# Patient Record
Sex: FEMALE | Race: WHITE | NOT HISPANIC OR LATINO | Employment: OTHER | URBAN - METROPOLITAN AREA
[De-identification: names, ages, dates, MRNs, and addresses within clinical notes are randomized per-mention and may not be internally consistent; named-entity substitution may affect disease eponyms.]

---

## 2017-01-16 ENCOUNTER — ALLSCRIPTS OFFICE VISIT (OUTPATIENT)
Dept: OTHER | Facility: OTHER | Age: 77
End: 2017-01-16

## 2017-02-20 ENCOUNTER — ALLSCRIPTS OFFICE VISIT (OUTPATIENT)
Dept: OTHER | Facility: OTHER | Age: 77
End: 2017-02-20

## 2017-02-28 ENCOUNTER — ALLSCRIPTS OFFICE VISIT (OUTPATIENT)
Dept: OTHER | Facility: OTHER | Age: 77
End: 2017-02-28

## 2017-02-28 ENCOUNTER — TRANSCRIBE ORDERS (OUTPATIENT)
Dept: ADMINISTRATIVE | Facility: HOSPITAL | Age: 77
End: 2017-02-28

## 2017-02-28 DIAGNOSIS — R25.9 ABNORMAL INVOLUNTARY MOVEMENT: ICD-10-CM

## 2017-02-28 DIAGNOSIS — R25.9 ABNORMAL INVOLUNTARY MOVEMENT: Primary | ICD-10-CM

## 2017-02-28 DIAGNOSIS — M54.14 RADICULOPATHY OF THORACIC REGION: ICD-10-CM

## 2017-03-01 ENCOUNTER — GENERIC CONVERSION - ENCOUNTER (OUTPATIENT)
Dept: OTHER | Facility: OTHER | Age: 77
End: 2017-03-01

## 2017-03-17 ENCOUNTER — HOSPITAL ENCOUNTER (OUTPATIENT)
Dept: MRI IMAGING | Facility: CLINIC | Age: 77
Discharge: HOME | End: 2017-03-17
Payer: MEDICARE

## 2017-03-17 DIAGNOSIS — R25.9 ABNORMAL INVOLUNTARY MOVEMENT: ICD-10-CM

## 2017-03-17 PROCEDURE — 72141 MRI NECK SPINE W/O DYE: CPT

## 2017-03-17 PROCEDURE — 70551 MRI BRAIN STEM W/O DYE: CPT

## 2017-03-20 ENCOUNTER — ALLSCRIPTS OFFICE VISIT (OUTPATIENT)
Dept: OTHER | Facility: OTHER | Age: 77
End: 2017-03-20

## 2017-03-24 ENCOUNTER — LAB CONVERSION - ENCOUNTER (OUTPATIENT)
Dept: OTHER | Facility: OTHER | Age: 77
End: 2017-03-24

## 2017-03-24 LAB
A/G RATIO (HISTORICAL): 1.3 (CALC) (ref 1–2.5)
ALBUMIN SERPL BCP-MCNC: 4 G/DL (ref 3.6–5.1)
ALP SERPL-CCNC: 73 U/L (ref 33–130)
ALT SERPL W P-5'-P-CCNC: 10 U/L (ref 6–29)
AST SERPL W P-5'-P-CCNC: 25 U/L (ref 10–35)
BASOPHILS # BLD AUTO: 0.7 %
BASOPHILS # BLD AUTO: 55 CELLS/UL (ref 0–200)
BILIRUB SERPL-MCNC: 0.4 MG/DL (ref 0.2–1.2)
BUN SERPL-MCNC: 14 MG/DL (ref 7–25)
BUN/CREA RATIO (HISTORICAL): ABNORMAL (CALC) (ref 6–22)
CALCIUM SERPL-MCNC: 9.1 MG/DL (ref 8.6–10.4)
CHLORIDE SERPL-SCNC: 91 MMOL/L (ref 98–110)
CO2 SERPL-SCNC: 30 MMOL/L (ref 20–31)
CREAT SERPL-MCNC: 0.93 MG/DL (ref 0.6–0.93)
DEPRECATED RDW RBC AUTO: 14.7 % (ref 11–15)
EGFR AFRICAN AMERICAN (HISTORICAL): 69 ML/MIN/1.73M2
EGFR-AMERICAN CALC (HISTORICAL): 60 ML/MIN/1.73M2
EOSINOPHIL # BLD AUTO: 1.6 %
EOSINOPHIL # BLD AUTO: 126 CELLS/UL (ref 15–500)
ERYTHROCYTE SEDIMENTATION RATE (HISTORICAL): 11 MM/H
GAMMA GLOBULIN (HISTORICAL): 3.1 G/DL (CALC) (ref 1.9–3.7)
GLUCOSE (HISTORICAL): 71 MG/DL (ref 65–99)
HCT VFR BLD AUTO: 39.1 % (ref 35–45)
HGB BLD-MCNC: 13.1 G/DL (ref 11.7–15.5)
LYME IGG/IGM AB (HISTORICAL): <0.9 INDEX
LYMPHOCYTES # BLD AUTO: 1904 CELLS/UL (ref 850–3900)
LYMPHOCYTES # BLD AUTO: 24.1 %
MCH RBC QN AUTO: 30.9 PG (ref 27–33)
MCHC RBC AUTO-ENTMCNC: 33.7 G/DL (ref 32–36)
MCV RBC AUTO: 91.7 FL (ref 80–100)
MONOCYTES # BLD AUTO: 727 CELLS/UL (ref 200–950)
MONOCYTES (HISTORICAL): 9.2 %
NEUTROPHILS # BLD AUTO: 5088 CELLS/UL (ref 1500–7800)
NEUTROPHILS # BLD AUTO: 64.4 %
PLATELET # BLD AUTO: 345 THOUSAND/UL (ref 140–400)
PMV BLD AUTO: 7 FL (ref 7.5–12.5)
POTASSIUM SERPL-SCNC: 4.6 MMOL/L (ref 3.5–5.3)
RBC # BLD AUTO: 4.26 MILLION/UL (ref 3.8–5.1)
SODIUM SERPL-SCNC: 127 MMOL/L (ref 135–146)
TOTAL PROTEIN (HISTORICAL): 7.1 G/DL (ref 6.1–8.1)
WBC # BLD AUTO: 7.9 THOUSAND/UL (ref 3.8–10.8)

## 2017-04-04 ENCOUNTER — ALLSCRIPTS OFFICE VISIT (OUTPATIENT)
Dept: OTHER | Facility: OTHER | Age: 77
End: 2017-04-04

## 2017-04-04 ENCOUNTER — TRANSCRIBE ORDERS (OUTPATIENT)
Dept: ADMINISTRATIVE | Facility: HOSPITAL | Age: 77
End: 2017-04-04

## 2017-04-04 DIAGNOSIS — M54.14 THORACIC ROOT LESIONS, NOT ELSEWHERE CLASSIFIED: Primary | ICD-10-CM

## 2017-04-12 ENCOUNTER — HOSPITAL ENCOUNTER (OUTPATIENT)
Dept: MRI IMAGING | Facility: CLINIC | Age: 77
Discharge: HOME | End: 2017-04-12
Payer: MEDICARE

## 2017-04-12 DIAGNOSIS — M54.14 RADICULOPATHY OF THORACIC REGION: ICD-10-CM

## 2017-04-17 ENCOUNTER — HOSPITAL ENCOUNTER (OUTPATIENT)
Dept: NEUROLOGY | Facility: HOSPITAL | Age: 77
Discharge: HOME | End: 2017-04-17
Attending: PSYCHIATRY & NEUROLOGY
Payer: MEDICARE

## 2017-04-17 DIAGNOSIS — R25.9 ABNORMAL INVOLUNTARY MOVEMENT: ICD-10-CM

## 2017-04-17 PROCEDURE — 95816 EEG AWAKE AND DROWSY: CPT

## 2017-05-01 ENCOUNTER — ALLSCRIPTS OFFICE VISIT (OUTPATIENT)
Dept: OTHER | Facility: OTHER | Age: 77
End: 2017-05-01

## 2017-05-03 ENCOUNTER — ALLSCRIPTS OFFICE VISIT (OUTPATIENT)
Dept: OTHER | Facility: OTHER | Age: 77
End: 2017-05-03

## 2017-05-08 ENCOUNTER — ALLSCRIPTS OFFICE VISIT (OUTPATIENT)
Dept: OTHER | Facility: OTHER | Age: 77
End: 2017-05-08

## 2017-05-12 ENCOUNTER — TRANSCRIBE ORDERS (OUTPATIENT)
Dept: MRI IMAGING | Facility: CLINIC | Age: 77
End: 2017-05-12

## 2017-05-12 DIAGNOSIS — M54.14 THORACIC ROOT LESIONS, NOT ELSEWHERE CLASSIFIED: Primary | ICD-10-CM

## 2017-05-15 ENCOUNTER — GENERIC CONVERSION - ENCOUNTER (OUTPATIENT)
Dept: OTHER | Facility: OTHER | Age: 77
End: 2017-05-15

## 2017-05-15 ENCOUNTER — HOSPITAL ENCOUNTER (OUTPATIENT)
Dept: MRI IMAGING | Facility: CLINIC | Age: 77
Discharge: HOME | End: 2017-05-15
Payer: MEDICARE

## 2017-05-15 DIAGNOSIS — M54.14 THORACIC ROOT LESIONS, NOT ELSEWHERE CLASSIFIED: ICD-10-CM

## 2017-05-15 PROCEDURE — 72146 MRI CHEST SPINE W/O DYE: CPT

## 2017-05-30 ENCOUNTER — ALLSCRIPTS OFFICE VISIT (OUTPATIENT)
Dept: OTHER | Facility: OTHER | Age: 77
End: 2017-05-30

## 2017-05-30 DIAGNOSIS — E87.1 HYPO-OSMOLALITY AND HYPONATREMIA: ICD-10-CM

## 2017-05-31 ENCOUNTER — ALLSCRIPTS OFFICE VISIT (OUTPATIENT)
Dept: OTHER | Facility: OTHER | Age: 77
End: 2017-05-31

## 2017-06-30 ENCOUNTER — ALLSCRIPTS OFFICE VISIT (OUTPATIENT)
Dept: OTHER | Facility: OTHER | Age: 77
End: 2017-06-30

## 2017-08-04 ENCOUNTER — ALLSCRIPTS OFFICE VISIT (OUTPATIENT)
Dept: OTHER | Facility: OTHER | Age: 77
End: 2017-08-04

## 2017-08-11 ENCOUNTER — ALLSCRIPTS OFFICE VISIT (OUTPATIENT)
Dept: OTHER | Facility: OTHER | Age: 77
End: 2017-08-11

## 2017-08-30 ENCOUNTER — GENERIC CONVERSION - ENCOUNTER (OUTPATIENT)
Dept: OTHER | Facility: OTHER | Age: 77
End: 2017-08-30

## 2017-09-19 ENCOUNTER — GENERIC CONVERSION - ENCOUNTER (OUTPATIENT)
Dept: OTHER | Facility: OTHER | Age: 77
End: 2017-09-19

## 2017-11-13 ENCOUNTER — ALLSCRIPTS OFFICE VISIT (OUTPATIENT)
Dept: OTHER | Facility: OTHER | Age: 77
End: 2017-11-13

## 2017-11-14 NOTE — PROGRESS NOTES
Assessment    1  Chronic pain syndrome (338 4) (G89 4)  2  Closed wedge fracture of thoracic vertebra (805 2) (S27 000A)    Plan  Osteoporosis    · Renew: TraMADol HCl - 50 MG Oral Tablet; TAKE 1 TABLET 3 TIMES DAILY; Do Not FillBefore: 14QVW3748  Rx By: Liyah Kinney; Dispense: 30 Days ; #:90 Tablet; Refill: 0;For: Osteoporosis; HUMBERTO = N; Print Rx    Discussion/Summary    This is a 20-year-old female who presents today for follow up office visit for management of chronic midthoracic low back and bilateral rib pain likely due to multiple compression fracture causing radicular symptoms  Her pain control is adequate with tramadol  She reports greater than 50% pain relief  I will provide her with a refill of tramadol 50mg po TID  She was given a 3 month supply and will follow up with my NP 1  in 12 weeks  encouraged her to continue Flexeril 10 mg by mouth po qhs and Neurontin 300 mg by mouth 3 times a day  [place] drug monitoring database was checked and is appropriate  UDS is consistent  risk of opioid medications, including dependence, addiction and tolerance were explained to the patient  The patient understands and agrees to use these medications only as prescribed  I have fully discussed the potential side effects of the medication with the patient, which include, but are not limited to, constipation, drowsiness, addiction, impaired judgment and risk of fatal overdose as not taken as prescribed  I have warned the patient that sharing medications is a felony  I warned against driving while taking sedating medications  At this point in time, the patient is showing no signs of addiction, abuse, diversion or suicidal ideation  cautioned patient regarding the use of benzodiazepines in conjunction with opiates  I informed her that the combination of both can precipitate respiratory depression and possibly death  She verbalizes understanding     The patient has the current Goals: Continue low dose opiates as prescribed  The patent has the current Barriers: Wheel chair bound  Patient is able to Self-Care  Possible side effects of new medications were reviewed with the patient/guardian today  The treatment plan was reviewed with the patient/guardian  The patient/guardian understands and agrees with the treatment plan   The patient was counseled regarding instructions for management,-- risk factor reductions,-- patient and family education,-- impressions,-- risks and benefits of treatment options-- and-- importance of compliance with treatment  There are risks associated with opiod medications, including dependence, addiction and tolerance  The patient understands and agrees to use these medications only as prescribed  Potential side effects of the medications include, but are not limited to, constipation, drowsiness, addiction, impaired judgment and risk of fatal overdose if not taken as prescribed  Sharing medications is a felony  At this point and time, the patient is showing no signs of addiction, abuse, diversion or suicidal ideation  1 Amended By: Barrera Wright; Nov 13 2017 2:38 PM EST    Chief Complaint    1  Abdominal Pain  2  Back Pain    History of Present Illness  The patient is a 54-year-old female who presents today with multiple pain issues - primarily rib pain and low back and thoracic pain from prior compression fracture  She is on neurontin 300mg po tid and Flexeril 10 mg by mouth po QHS  She is on tramadol 50mg po TID which helps as well  She is here today for medication refill  She is on clonazepam 0 5 mg by mouth daily at bedtime, trazodone 25 mg by mouth daily at bedtime  Pain score is rated 5 out of 10  She demonstrates appropriate behavior  She presents today with her   recent UDS was consistent  PDMP was verified and is appropriate    Referring physician is  Venessa Riojas  Primary Care physician is  Brody Bai presents with complaints of constant episodes of moderate mid and lower abdominal pain, described as sharp, non-radiating  On a scale of 1 to 10, the patient rates the pain as 5  Symptoms are unchanged  Cecilia Brown presents with complaints of gradual onset of constant episodes of moderate bilateral lower back pain, described as sharp, non-radiating  On a scale of 1 to 10, the patient rates the pain as 5  Symptoms are unchanged  Review of Systems   Constitutional: no fever,-- no recent weight gain-- and-- no recent weight loss  Eyes: no double vision-- and-- no blurry vision  Cardiovascular: no chest pain,-- no palpitations-- and-- no lower extremity edema  Respiratory: no complaints of shortness of breath-- and-- no wheezing  Musculoskeletal: muscle weakness, but-- no difficulty walking,-- no joint stiffness,-- no joint swelling,-- no limb swelling,-- no pain in extremity-- and-- no decreased range of motion  Neurological: memory loss, but-- no dizziness,-- no difficulty swallowing,-- no loss of consciousness-- and-- no seizures  Gastrointestinal: no nausea,-- no vomiting,-- no constipation-- and-- no diarrhea  Genitourinary: no difficulty initiating urine stream,-- no genital pain-- and-- no frequent urination  Integumentary: no complaints of skin rash  Psychiatric: no depression  Endocrine: no excessive thirst,-- no adrenal disease,-- no hypothyroidism-- and-- no hyperthyroidism  Hematologic/Lymphatic: no tendency for easy bruising-- and-- no tendency for easy bleeding  ROS reviewed  Active Problems  1  Abdominal pain (789 00) (R10 9)  2  Abnormal involuntary movements (781 0) (R25 9)  3  Anxiety state (300 00) (F41 1)  4  Aortic aneurysm (441 9) (I71 9)  5  Cataract, bilateral (366 9) (H26 9)  6  Chronic pain syndrome (338 4) (G89 4)  7  Closed wedge fracture of thoracic vertebra (805 2) (S22 000A)  8  COPD, 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)  9  Encounter for special screening examination for genitourinary disorder (V81 6) (Z13 89)  10  Flu vaccine need (V04 81) (Z23)  11  Generalized weakness (780 79) (R53 1)  12  HTN (hypertension) (401 9) (I10)  13  Hyponatremia (276 1) (E87 1)  14  Insomnia, unspecified type (780 52) (G47 00)  15  Irritable bowel syndrome without diarrhea (564 1) (K58 9)  16  Osteoporosis (733 00) (M81 0)  17  Panic disorder (300 01) (F41 0)  18  Thoracic radiculopathy (724 4) (M54 14)    Past Medical History  1  History of Accidental fall (E888 9) (W19 XXXA)  2  History of Anxiety (300 00) (F41 9)  3  History of Benign essential hypertension (401 1) (I10)  4  History of Colonoscopy (Fiberoptic)  5  History of Compression fracture of thoracic vertebra (805 2) (S22 000A)  6  History of Failure to thrive in adult (783 7) (R62 7)  7  History of bone scan (V15 89) (Z92 89)  8  History of chronic obstructive lung disease (V12 69) (Z87 09)  9  History of depression (V11 8) (Z86 59)  10  History of dysthymia (V11 8) (Z86 59)  11  History of Hyponatremia (276 1) (E87 1)  12  History of Lumbar vertebral fracture (805 4) (S32 009A)  13  History of Mammogram normal    The active problems and past medical history were reviewed and updated today  Surgical History  1  History of Appendectomy  2  History of Hysterectomy    Family History  Mother   1  Family history of hepatic cirrhosis (V18 59) (Z83 79)  Father   2  Family history of Bone cancer    Social History     · Always uses seat belt   · Consumes alcohol occasionally (V49 89) (Z78 9)   · Current every day smoker (305 1) (F17 200)   ·    · No alcohol use  The social history was reviewed and updated today  Current Meds  1  Advil 200 MG Oral Tablet; Take 2 pills in the am with food; Therapy: 64LMU3149 to (Last PZ:54PBQ7681) Ordered  2  Aspirin 81 MG TABS; takes 1 tablet by mouth every other day; Therapy: (Recorded:16Jan2017) to Recorded  3  Calcium 600+D 600-200 MG-UNIT Oral Tablet; Therapy: (Recorded:73Swq9114) to Recorded  4  ClonazePAM 0 5 MG Oral Tablet;  Take 1 tablet daily and 1/2 at night; Last Rx:80Vgu6085 Ordered  5  Cyclobenzaprine HCl - 10 MG Oral Tablet; TAKE 1 TABLET AT BEDTIME AS NEEDED; Therapy: 05OYS1202 to (77 873 135)  Requested for: 17Nak3744; Last Rx:26Zbs8227 Ordered  6  Gabapentin 300 MG Oral Capsule; take 1 capsule three times a day; Therapy: 90Oeg2888 to (Evaluate:15Tvo7397)  Requested for: 74Hxc5308; Last Rx:69Bix3920 Ordered  7  Metamucil POWD; Therapy: (Arianna Pu) to Recorded  8  Multi-Vitamin Oral Tablet; Therapy: (Arianna Pu) to Recorded  9  TraMADol HCl - 50 MG Oral Tablet; TAKE 1 TABLET 3 TIMES DAILY; Therapy: 23IOE7079 to (Evaluate:28Nov2017); Last Rx:67Csw3901 Ordered  10  TraZODone HCl - 50 MG Oral Tablet; 1/2 - 1 po hs prn  Requested for: 47XQB3198; Last  Rx:18Oct2017 Ordered    The medication list was reviewed and updated today  Allergies  1  No Known Drug Allergies  2  No Known Environmental Allergies  3  No Known Food Allergies    Vitals  Vital Signs    Recorded: 52ZOF7606 02:14PM   Heart Rate 76   Respiration 14   Systolic 150   Diastolic 78   Height 4 ft 11 in   Weight 97 lb    BMI Calculated 19 59   BSA Calculated 1 36   Pain Scale 5       Physical Exam   Constitutional  General appearance: Well developed, well nourished, alert, in no distress, non-toxic and no overt pain behavior  Eyes  Sclera: anicteric  HEENT  Hearing grossly intact  Neck  Neck: Supple, symmetric, trachea midline, no masses  Pulmonary  Respiratory effort: Even and unlabored  Cardiovascular  Examination of extremities: No edema or pitting edema present  Skin  Skin and subcutaneous tissue: Normal without rashes or lesions, well hydrated  Psychiatric  Mood and affect: Mood and affect appropriate  Neurologic  Cranial nerves: Cranial nerves II-XII grossly intact  Musculoskeletal  Gait and station: Normal    Thoracic Spine examination demonstrates Thoracic Spine:  Appearance: Normal   Spinal alignment exhibits normal kyphosis  Tenderness:  thoracic spine tenderness,-- left paraspinal tenderness-- and-- right paraspinal tenderness  Palpatory Findings include bilateral muscle spasms  Evaluation of Muscle Stretch Reflexes on the right side demonstrates muscle stretch reflexes equal and symmetric right lower limbs  Evaluation of Muscle Stretch Reflexes on the left side demonstrates muscle stretch reflexes equal and symmetric right lower limbs  Results/Data  Results Free Text Form [facility]:   Results    Other  tramadol last taken 11/13 am       Future Appointments    Date/Time Provider Specialty Site   04/06/2018 09:00 AM Jeanette Bob MD Neurology [address]   11/20/2017 12:40 PM Cookie Manriquez MD Neurology [address]   12/18/2017 03:00 PM BARNEY Gomes   Nephrology [address]   11/17/2017 03:30 PM Sirena Jin, [facility] [place] [address]       Signatures   Electronically signed by : Bard Hubert MD; Nov 13 2017  2:38PM EST                       (Author)    Electronically signed by : Bard Hubert MD; Nov 13 2017  2:38PM EST                       (Author)

## 2017-11-17 ENCOUNTER — GENERIC CONVERSION - ENCOUNTER (OUTPATIENT)
Dept: OTHER | Facility: OTHER | Age: 77
End: 2017-11-17

## 2017-11-17 ENCOUNTER — ALLSCRIPTS OFFICE VISIT (OUTPATIENT)
Dept: OTHER | Facility: OTHER | Age: 77
End: 2017-11-17

## 2017-11-17 DIAGNOSIS — E87.1 HYPO-OSMOLALITY AND HYPONATREMIA (CODE): ICD-10-CM

## 2017-11-17 DIAGNOSIS — I10 ESSENTIAL (PRIMARY) HYPERTENSION: ICD-10-CM

## 2017-11-20 ENCOUNTER — ALLSCRIPTS OFFICE VISIT (OUTPATIENT)
Dept: OTHER | Facility: OTHER | Age: 77
End: 2017-11-20

## 2017-11-21 NOTE — PROGRESS NOTES
Assessment    1  Abnormal involuntary movements (781 0) (R25 9)    Plan  Abnormal involuntary movements    · Follow-up PRN Evaluation and Treatment  Follow-up  Status: Complete  Done:20Nov2017   Ordered; Abnormal involuntary movements; Ordered By: Malgorzata Barclay Performed:  Due: 52IWQ3144    Discussion/Summary  Discussion Summary:   Discussed with patient and her , it looks like her Jocelyn's Genetic testing is negative, I have advised her to follow up with Dr En Jorge movement Disorder specialist and management as per her, patient and the family are agreeable, they will also follow up with family physician, if she has any worsening symptoms to go to the hospital, take fall and safety precautions and see me back on as-needed basis  Counseling Documentation With Imm: The was counseled regarding diagnostic results,-- risk factor reductions,-- prognosis,-- patient and family education,-- risks and benefits of treatment options,-- importance of compliance with treatment  Medication SE Review and Pt Understands Tx: Possible side effects of new medications were reviewed with the patient/guardian today  The treatment plan was reviewed with the patient/guardian  The patient/guardian understands and agrees with the treatment plan      Chief Complaint  Chief Complaint Free Text Note Form: The Patient returns today following up on involuntary movements        History of Present Illness  HPI: Patient is here accompanied with her  in follow-up for 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, she has seen Dr En Jorge movement Disorder specialist and has an appointment to see her back in follow-up in the next few months, she had a genetic testing for Jocelyn's disease which was negative, she is also in follow up with the pain specialist regarding her back pain, denying any suicidal or homicidal, she does have urinary urgency, no bowel incontinence, she ambulates with a walker and is currently on a wheelchair, no difficulty in swallowing, no other complaints  Review of Systems  Neurological ROS:  Constitutional: no fever  HEENT: feeling congested, but-- no dysphagia  Cardiovascular: no chest pain or pressure  Respiratory: no shortness of breath with or without exertion  Gastrointestinal: no abdominal pain  Genitourinary: increased frequency  Musculoskeletal: head/neck/back pain  Integumentary no rash: Bartholome Pinks Psychiatric: no sleep problems  Endocrine no unusual weight loss or gain  Hematologic/Lymphatic: no unusual bleeding  Neurological General: no headache,-- no trouble falling asleep-- and-- no awakening at night  Neurological Mental Status: memory problems  Neurological Cranial Nerves: no vertigo or dizziness  Neurological Motor findings include: twitching  Neurological Coordination: balance difficulties  Neurological Gait: difficulty walking, but-- has not had falls  Active Problems  1  Abdominal pain (789 00) (R10 9)   2  Abnormal involuntary movements (781 0) (R25 9)   3  Anxiety state (300 00) (F41 1)   4  Aortic aneurysm (441 9) (I71 9)   5  At risk for falls (V15 88) (Z91 81)   6  Cataract, bilateral (366 9) (H26 9)   7  Chronic pain syndrome (338 4) (G89 4)   8  Closed wedge fracture of thoracic vertebra (805 2) (S22 000A)   9  COPD, 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)   10  Encounter for special screening examination for genitourinary disorder (V81 6) (Z13 89)   11  Flu vaccine need (V04 81) (Z23)   12  Generalized weakness (780 79) (R53 1)   13  HTN (hypertension) (401 9) (I10)   14  Hyponatremia (276 1) (E87 1)   15  Insomnia, unspecified type (780 52) (G47 00)   16  Irritable bowel syndrome without diarrhea (564 1) (K58 9)   17  Medicare annual wellness visit, subsequent (V70 0) (Z00 00)   18  Osteoporosis (733 00) (M81 0)   19  Panic disorder (300 01) (F41 0)   20  Thoracic radiculopathy (724 4) (M54 14)    Past Medical History  1   History of Accidental fall (E888 9) [facility])   2  History of Anxiety (300 00) (F41 9)   3  History of Benign essential hypertension (401 1) (I10)   4  History of Colonoscopy (Fiberoptic)   5  History of Compression fracture of thoracic vertebra (805 2) (S22 000A)   6  History of Failure to thrive in adult (783 7) (R62 7)   7  History of bone scan (V15 89) (Z92 89)   8  History of chronic obstructive lung disease (V12 69) (Z87 09)   9  History of depression (V11 8) (Z86 59)   10  History of dysthymia (V11 8) (Z86 59)   11  History of Hyponatremia (276 1) (E87 1)   12  History of Lumbar vertebral fracture (805 4) (S32 009A)   13  History of Mammogram normal    Surgical History  1  History of Appendectomy   2  History of Hysterectomy    Family History  Mother    1  Family history of hepatic cirrhosis (V18 59) (Z83 79)  Father    2  Family history of Bone cancer    Social History     · Always uses seat belt   · Consumes alcohol occasionally (V49 89) (Z78 9)   · Current every day smoker (305 1) (F17 200)   ·    · No alcohol use    Current Meds   1  Advil PM TABS; TAKE 2 TABLETS AT BEDTIME; Therapy: (Recorded:20Nov2017) to Recorded   2  Align CAPS; Therapy: (Recorded:20Nov2017) to Recorded   3  Aspirin 81 MG TABS; takes 1 tablet by mouth every other day; Therapy: (Recorded:16Jan2017) to Recorded   4  Calcium 600+D 600-200 MG-UNIT Oral Tablet; TAKE TABLET  every other day; Therapy: (Recorded:20Nov2017) to Recorded   5  ClonazePAM 0 5 MG Oral Tablet; Take 1 tablet twice daily; Therapy: (Recorded:20Nov2017) to Recorded   6  Cyclobenzaprine HCl - 10 MG Oral Tablet; TAKE 1 TABLET AT BEDTIME AS NEEDED; Therapy: 18IYN4711 to [phone])  Requested for: 04Ktl1464; Last Rx:46Zkx0281 Ordered   7  Gabapentin 300 MG Oral Capsule; take 1 capsule three times a day; Therapy: 32Xqt9559 to (Evaluate:92Rku2016)  Requested for: 32Gnt7297; Last Rx:08Fnw3942 Ordered   8  Metamucil POWD; Therapy: (Jacobo Spurling) to Recorded   9   Multi-Vitamin Oral Tablet; Therapy: (Recorded:28Apr2016) to Recorded   10  TraMADol HCl - 50 MG Oral Tablet; TAKE 1 TABLET 3 TIMES DAILY; Therapy: 00ETE2725 to (Evaluate:17Mbq8714); Last Rx:13Nov2017 Ordered   11  TraZODone HCl - 50 MG Oral Tablet; 1/2 - 1 po hs prn  Requested for: 10IHE1536; Last  Rx:18Oct2017 Ordered    Allergies  1  No Known Drug Allergies    2  No Known Environmental Allergies   3  No Known Food Allergies    Vitals  Signs   Recorded: 20Nov2017 12:27PM   Heart Rate: 70, L Radial  Pulse Quality: Regular, L Radial  Respiration: 18  Systolic: 274, LUE, Sitting  Diastolic: 90, LUE, Sitting  Height: 4 ft 11 in  Weight: 97 lb   BMI Calculated: 19 59  BSA Calculated: 1 36    Physical Exam   Constitutional  General appearance: No acute distress, well appearing and well nourished  Eyes  Ophthalmoscopic examination: Vision is grossly normal  Gross visual field testing by confrontation shows no abnormalities  EOMI in both eyes  Conjunctivae clear  Eyelids normal palpebral fissures equal  Orbits exhibit normal position  No discharge from the eyes  PERRL  Musculoskeletal She is currently on a wheelchair  Muscle strength: Normal strength throughout  Involuntary movements: Abnormal involuntary movements were observed  -- Involuntary mouth movements and choreiform movements  Neurologic  Orientation to person, place, and time: Normal    Language: Names objects, able to repeat phrases and speaks spontaneously  2nd cranial nerve: Normal    3rd, 4th, and 6th cranial nerves: Normal    5th cranial nerve: Normal    7th cranial nerve: Normal    8th cranial nerve: Normal    9th cranial nerve: Normal    11th cranial nerve: Normal    12th cranial nerve: Normal        Future Appointments    Date/Time Provider Specialty Site   02/06/2018 02:45 PM ELMA Javier Pain Management [facility] SPINE   04/06/2018 09:00 AM Kylie Riojas MD Neurology [facility] NEUROLOGY [facility]   12/18/2017 03:00 PM BARNEY Do   Nephrology  [address]       Signatures   Electronically signed by : Delaney Owens MD; Nov 20 2017  1:19PM EST                       (Author)

## 2017-12-19 ENCOUNTER — GENERIC CONVERSION - ENCOUNTER (OUTPATIENT)
Dept: OTHER | Facility: OTHER | Age: 77
End: 2017-12-19

## 2018-01-12 VITALS
HEART RATE: 70 BPM | HEIGHT: 59 IN | DIASTOLIC BLOOD PRESSURE: 90 MMHG | RESPIRATION RATE: 18 BRPM | SYSTOLIC BLOOD PRESSURE: 130 MMHG | BODY MASS INDEX: 19.56 KG/M2 | WEIGHT: 97 LBS

## 2018-01-12 VITALS
SYSTOLIC BLOOD PRESSURE: 136 MMHG | BODY MASS INDEX: 18.69 KG/M2 | WEIGHT: 99 LBS | DIASTOLIC BLOOD PRESSURE: 88 MMHG | HEIGHT: 61 IN | HEART RATE: 76 BPM

## 2018-01-13 VITALS
HEART RATE: 80 BPM | HEIGHT: 61 IN | DIASTOLIC BLOOD PRESSURE: 78 MMHG | SYSTOLIC BLOOD PRESSURE: 114 MMHG | BODY MASS INDEX: 18.71 KG/M2 | WEIGHT: 99.13 LBS

## 2018-01-13 VITALS
SYSTOLIC BLOOD PRESSURE: 108 MMHG | HEART RATE: 68 BPM | DIASTOLIC BLOOD PRESSURE: 62 MMHG | WEIGHT: 98.25 LBS | TEMPERATURE: 98 F | BODY MASS INDEX: 18.55 KG/M2 | HEIGHT: 61 IN

## 2018-01-13 VITALS
DIASTOLIC BLOOD PRESSURE: 64 MMHG | HEART RATE: 100 BPM | SYSTOLIC BLOOD PRESSURE: 100 MMHG | WEIGHT: 97 LBS | HEIGHT: 59 IN | BODY MASS INDEX: 19.56 KG/M2

## 2018-01-13 VITALS
WEIGHT: 98.31 LBS | RESPIRATION RATE: 16 BRPM | HEIGHT: 61 IN | DIASTOLIC BLOOD PRESSURE: 58 MMHG | SYSTOLIC BLOOD PRESSURE: 104 MMHG | BODY MASS INDEX: 18.56 KG/M2

## 2018-01-13 VITALS
SYSTOLIC BLOOD PRESSURE: 128 MMHG | WEIGHT: 98 LBS | BODY MASS INDEX: 18.5 KG/M2 | HEART RATE: 64 BPM | HEIGHT: 61 IN | RESPIRATION RATE: 18 BRPM | DIASTOLIC BLOOD PRESSURE: 86 MMHG

## 2018-01-13 VITALS
WEIGHT: 97 LBS | SYSTOLIC BLOOD PRESSURE: 118 MMHG | RESPIRATION RATE: 14 BRPM | HEART RATE: 76 BPM | HEIGHT: 59 IN | BODY MASS INDEX: 19.56 KG/M2 | DIASTOLIC BLOOD PRESSURE: 78 MMHG

## 2018-01-14 VITALS
BODY MASS INDEX: 18.5 KG/M2 | DIASTOLIC BLOOD PRESSURE: 84 MMHG | HEART RATE: 76 BPM | HEIGHT: 61 IN | SYSTOLIC BLOOD PRESSURE: 120 MMHG | WEIGHT: 98 LBS

## 2018-01-14 VITALS
WEIGHT: 96.13 LBS | RESPIRATION RATE: 15 BRPM | DIASTOLIC BLOOD PRESSURE: 72 MMHG | OXYGEN SATURATION: 97 % | HEART RATE: 102 BPM | SYSTOLIC BLOOD PRESSURE: 115 MMHG | HEIGHT: 61 IN | BODY MASS INDEX: 18.15 KG/M2 | TEMPERATURE: 97.2 F

## 2018-01-14 VITALS
HEART RATE: 80 BPM | SYSTOLIC BLOOD PRESSURE: 118 MMHG | BODY MASS INDEX: 18.71 KG/M2 | WEIGHT: 99.13 LBS | DIASTOLIC BLOOD PRESSURE: 78 MMHG | HEIGHT: 61 IN

## 2018-01-14 VITALS
WEIGHT: 99.13 LBS | HEIGHT: 61 IN | BODY MASS INDEX: 18.71 KG/M2 | DIASTOLIC BLOOD PRESSURE: 96 MMHG | RESPIRATION RATE: 14 BRPM | HEART RATE: 88 BPM | SYSTOLIC BLOOD PRESSURE: 156 MMHG

## 2018-01-15 VITALS — HEART RATE: 82 BPM | RESPIRATION RATE: 16 BRPM | HEIGHT: 61 IN | BODY MASS INDEX: 18.31 KG/M2 | WEIGHT: 97 LBS

## 2018-01-15 VITALS
BODY MASS INDEX: 18.93 KG/M2 | SYSTOLIC BLOOD PRESSURE: 108 MMHG | HEART RATE: 51 BPM | OXYGEN SATURATION: 96 % | WEIGHT: 100.25 LBS | DIASTOLIC BLOOD PRESSURE: 72 MMHG | RESPIRATION RATE: 14 BRPM | HEIGHT: 61 IN

## 2018-01-15 NOTE — MISCELLANEOUS
Message   Recorded as Task   Date: 09/19/2017 11:48 AM, Created By: Claribel Miguel   Task Name: Call Back   Assigned To: Sirena Farr   Regarding Patient: Leighann Mccall, Status: Active   CommentDunia Madden - 19 Sep 2017 11:48 AM     TASK CREATED  [facility] Call Center- Patients  Kenny Slot called stating the pharmacy told him that they cannot refill Gabapentin 300mg and to call [facility]  Requesting [facility] to send refill to [phone]    any questions c/b [phone]   Kyung Springer - 19 Sep 2017 12:08 PM     TASK REASSIGNED: Previously Assigned To [facility] fay Shoemaker - 19 Sep 2017 12:17 PM     TASK REASSIGNED: Previously Assigned To Naomi Espitia  Can you please call the pharmacy and see why they are calling regarding the patient's gabapentin - she is on gabapentin 300mg po titrated to TID  Nayeli Yang - 19 Sep 2017 2:07 PM     TASK EDITED  I called pharmacy and talked to pharmacist she reports that refills were automatically refilled via fax request in the past by Dr Onofre Guevara  However, at this time there were no refill requests submitted  Therefore I sent over a new prescription for gabapentin at this time with 2 refills  Patient is taking gabapentin 300mg tid  I called back the patient's  and let him know that the prescription was sent to the pharmacy with 2 refills  He reports that patient does have a couple remaining days of gabapentin at home  Nayeli Yang - 19 Sep 2017 2:07 PM     TASK REASSIGNED: Previously Assigned To Nayeli Yang        Active Problems    1  Abdominal pain (789 00) (R10 9)   2  Abnormal involuntary movements (781 0) (R25 9)   3  Anxiety state (300 00) (F41 1)   4  Aortic aneurysm (441 9) (I71 9)   5  Cataract, bilateral (366 9) (H26 9)   6  Chronic pain syndrome (338 4) (G89 4)   7  Compression fracture of thoracic vertebra, closed, initial encounter (805 2) (S22 000A)   8  COPD, 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)   9   Encounter for special screening examination for genitourinary disorder (V81 6) (Z13 89)   10  Flu vaccine need (V04 81) (Z23)   11  Generalized weakness (780 79) (R53 1)   12  HTN (hypertension) (401 9) (I10)   13  Hyponatremia (276 1) (E87 1)   14  Insomnia, unspecified type (780 52) (G47 00)   15  Irritable bowel syndrome without diarrhea (564 1) (K58 9)   16  Osteoporosis (733 00) (M81 0)   17  Panic disorder (300 01) (F41 0)   18  Thoracic radiculopathy (724 4) (M54 14)    Current Meds   1  Advil 200 MG Oral Tablet; Take 2 pills in the am with food; Therapy: 51RWQ0245 to (Last EE:91FMX8120) Ordered   2  Aspirin 81 MG TABS; takes 1 tablet by mouth every other day; Therapy: (Recorded:16Jan2017) to Recorded   3  Calcium 600+D 600-200 MG-UNIT Oral Tablet; Therapy: (Recorded:88Oqg4305) to Recorded   4  ClonazePAM 0 5 MG Oral Tablet; Take 1 tablet daily and 1/2 at night; Last Rx:05Ggn1433   Ordered   5  Cyclobenzaprine HCl - 10 MG Oral Tablet; TAKE 1 TABLET AT BEDTIME AS NEEDED; Therapy: 44MHK2598 to (22 013189)  Requested for: 11Teu2177; Last   Rx:60Wfm0650 Ordered   6  Gabapentin 300 MG Oral Capsule; take 1 capsule three times a day; Therapy: 49Wzw1105 to (Evaluate:27Hlf5584)  Requested for: 15Whb2563; Last   Rx:21Kku3456 Ordered   7  Metamucil POWD;   Therapy: (Mani Magallon) to Recorded   8  Multi-Vitamin Oral Tablet; Therapy: (Mani Magallon) to Recorded   9  TraMADol HCl - 50 MG Oral Tablet; TAKE 1 TABLET 3 TIMES DAILY; Therapy: 15HAV7698 to (Evaluate:28Nov2017); Last Rx:73Gss7053 Ordered   10  TraZODone HCl - 50 MG Oral Tablet; 1/2 - 1 po hs prn  Requested for: 97OQC1797; Last    Rx:44Zyd8596 Ordered    Allergies    1  No Known Drug Allergies    2  No Known Environmental Allergies   3   No Known Food Allergies    Signatures   Electronically signed by : Rob Pringle, ; Sep 19 2017  2:17PM EST                       (Author)

## 2018-01-15 NOTE — MISCELLANEOUS
Message   Recorded as Task   Date: 05/15/2017 03:05 PM, Created By: Nadia Dimas   Task Name: Miscellaneous   Assigned To: [facility] es clinical,Team   Regarding Patient: Elton Tony, Status: In Progress   Comment:    Nicola Diehla - 15 May 2017 3:05 PM     TASK CREATED  Pt's  Hugh Pina called stating Dr Maegan Meade prescribed 2 valiums for pt's MRI (which is scheduled for today at 5:00)  He said he needs an answer by 4:00  He will give her 1 valium prior to MRI  He is asking when 2nd valium should be taken  Pls call pt at [phone]  Gabbi Holland - 15 May 2017 3:07 PM     TASK EDITED  Do you want the pt to repeat it at the time of the mri? 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)   6  Encounter for special screening examination for genitourinary disorder (V81 6) (Z13 89)   7  Flu vaccine need (V04 81) (Z23)   8  Generalized weakness (780 79) (R53 1)   9  HTN (hypertension) (401 9) (I10)   10  Hyponatremia (276 1) (E87 1)   11  Insomnia, unspecified type (780 52) (G47 00)   12  Irritable bowel syndrome without diarrhea (564 1) (K58 9)   13  Osteoporosis (733 00) (M81 0)   14  Panic disorder (300 01) (F41 0)   15  Thoracic radiculopathy (724 4) (M54 14)    Current Meds   1  Advil PM TABS; Therapy: (Recorded:28Apr2016) to Recorded   2  Aleve CAPS; Therapy: (Arabella Hernandez) to Recorded   3  Aspirin 81 MG TABS; takes 1 tablet by mouth every other day; Therapy: (Recorded:16Jan2017) to Recorded   4   Calcium 600+D 600-200 MG-UNIT Oral Tablet; Therapy: (Recorded:63Vej3938) to Recorded   5  ClonazePAM 0 5 MG Oral Tablet; Take 1 tablet twice daily; Therapy: (Recorded:01May2017) to Recorded   6  Cyclobenzaprine HCl - 10 MG Oral Tablet; TAKE 1 TABLET AT BEDTIME AS NEEDED; Therapy: 94SZR3644 to (Evaluate:19May2017)  Requested for: 20Mar2017; Last   Rx:20Mar2017 Ordered   7  DiazePAM 5 MG Oral Tablet; take 1 tab 1 hr before procedure; Therapy: 08FHK1584 to (Evaluate:04May2017); Last NO:49JJR5192 Ordered   8  Gabapentin 300 MG Oral Capsule; take 1 tab at bed time for 3 days; then twice a day   from day 4-6; then three times a day from day 7 onward; Therapy: 26Xkn1849 to (Evaluate:21Apr2017)  Requested for: 76Yyd8350; Last   Rx:61Ujv4038 Ordered   9  Metamucil POWD;   Therapy: (Recorded:28Apr2016) to Recorded   10  Multi-Vitamin Oral Tablet; Therapy: (Mariajose Maciel) to Recorded   11  Nucynta  MG Oral Tablet Extended Release 12 Hour; Take 1 tablet every 12    hours; Therapy: 85SOV0106 to (Evaluate:04Jun2017); Last DM:31EOL8903 Ordered   12  PARoxetine HCl - 10 MG Oral Tablet; TAKE 0 5 TABLET Every morning; Therapy: (Recorded:01May2017) to Recorded   13  Tetrabenazine 12 5 MG Oral Tablet; 1 po qam for 1 week, if no improvement then 1 po    bid q 6 hours apart; Therapy: 72WFO9834 to (Douglas Miramontes)  Requested for: 97QSD3031; Last    Rx:73Myt2654 Ordered   14  TraZODone HCl - 50 MG Oral Tablet; 1/2 - 1 po hs prn  Requested for: 89TIZ5543; Last    Rx:18Fjb5954 Ordered    Allergies    1   No Known Drug Allergies    Signatures   Electronically signed by : Devaughn Markham RN; May 15 2017  4:08PM EST                       (Author)

## 2018-01-16 NOTE — PROGRESS NOTES
Assessment    1  Abnormal involuntary movements (781 0) (R25 9)   2  Abdominal pain (789 00) (R10 9)   3  Generalized weakness (780 79) (R53 1)   4  Hyponatremia (276 1) (E87 1)   5  Osteoporosis (733 00) (M81 0)   6  Cataract, bilateral (366 9) (H26 9)    Plan  Abnormal involuntary movements    · Louis Epstein MD, Jadiel Driscoll  Neurology Physician Referral  Consult Only: the expectation  is that the referring provider will communicate back to the patient on treatment options  Evaluation and Treatment: the expectation is that the referred to provider will  communicate back to the patient on treatment options  Status: Hold For - Scheduling   Requested for: 83TWB0524  Care Summary provided  : Yes  Cataract, bilateral    · Bran FELIZ, Sommer Brian  (Ophthalmology) Co-Management  *  Status: Hold For - Scheduling   Requested for: 65ORG3207  Care Summary provided  : Yes  Hyponatremia    · (LC) BMP8+eGFR; Status:Active; Requested for:25Apr2017;   Osteoporosis    · Prolia 60 MG/ML Subcutaneous Solution; INJECT SUBCUTANEOUSLY  60 MG / 1  ML EVERY 6 MONTHS   · Zoledronic Acid 5 MG/100ML Intravenous Solution (Reclast); INFUSE 100 ML Once  over 20-30 minutes    Discussion/Summary    Since you have not heard about an appt with Dr Reynold Robledo I changed the consult to "appt needed" for Dr Reynold Robledo  Will work on getting her either prolia or reclast  Have her use some table salt and cut back on the water  Cut back the klonopin this week to 1 pill and next week discontinue  She is on too many sedating drugs  Will refer to Dr Randle Cancer for evaluation of the cataracts  Pt adamantly refuses a further mammo  Have the labs repeated in 3 weeks when she is off klonopin for a week  I will call with numbers  We will still try to arrange either reclast or prolia for her  Will order both prolia and reclast as it is not clear which your insurance company will pay for  She will need either reclast once yearly or prolia twice yearly     The patient, patient's family was counseled regarding diagnostic results, instructions for management, risk factor reductions, prognosis, patient and family education, impressions, risks and benefits of treatment options, importance of compliance with treatment  Possible side effects of new medications were reviewed with the patient/guardian today  The treatment plan was reviewed with the patient/guardian  The patient/guardian understands and agrees with the treatment plan      Chief Complaint  Patient is in office today for a follow up  History of Present Illness  Pt is here for routine f/u  Seen by pain management and neuro  Liked both of them   felt it was trial and error  Given gabapentin a muscle relaxer   thinks she sleeps a lot, but not too much  Pt thinks it gives her a good night sleep  Still feels weak  Also reports that she fell out of bed a couple of weeks ago and is sore all over  Follows with Dr Darrion Aguilera in May  Has eeg coming up on Easter Monday  Appt was not made yet with Dr Dai Ledezma and  has to make appt for mri on back and he ordered physical therapy  Review of Systems    Constitutional: feeling poorly, feeling tired and Feels weak, but no fever and no chills  Eyes: no eye pain and eyes not red  ENT: no earache, no nosebleeds, no sore throat and no nasal discharge  Cardiovascular: no chest pain and no palpitations  Respiratory: Still smoking, but no shortness of breath, no cough and no wheezing  Gastrointestinal: no nausea, no constipation and no diarrhea    The patient presents with complaints of abdominal pain (etiology unclear )  Genitourinary: no dysuria and no incontinence  Musculoskeletal: arthralgias and Still with mid and low back pains, but no myalgias  Integumentary: Pt refuses further mammos, but no rashes and no skin lesions  Neurological: no headache, no dizziness and no convulsions  Psychiatric: no anxiety, no sleep disturbances and no depression  ROS reviewed  Active Problems    1  Abdominal pain (789 00) (R10 9)   2  Abnormal involuntary movements (781 0) (R25 9)   3  Aortic aneurysm (441 9) (I71 9)   4  COPD, 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)   5  Encounter for special screening examination for genitourinary disorder (V81 6) (Z13 89)   6  Flu vaccine need (V04 81) (Z23)   7  Generalized weakness (780 79) (R53 1)   8  HTN (hypertension) (401 9) (I10)   9  Hyponatremia (276 1) (E87 1)   10  Insomnia, unspecified type (780 52) (G47 00)   11  Irritable bowel syndrome without diarrhea (564 1) (K58 9)   12  Osteoporosis (733 00) (M81 0)   13  Panic disorder (300 01) (F41 0)   14  Thoracic radiculopathy (724 4) (M54 14)    Past Medical History    1  History of Accidental fall (E888 9) (W19 XXXA)   2  History of Anxiety (300 00) (F41 9)   3  History of Benign essential hypertension (401 1) (I10)   4  History of Colonoscopy (Fiberoptic)   5  History of Compression fracture of thoracic vertebra (805 2) (S22 000A)   6  History of Failure to thrive in adult (783 7) (R62 7)   7  History of bone scan (V15 89) (Z92 89)   8  History of chronic obstructive lung disease (V12 69) (Z87 09)   9  History of depression (V11 8) (Z86 59)   10  History of dysthymia (V11 8) (Z86 59)   11  History of Hyponatremia (276 1) (E87 1)   12  History of Lumbar vertebral fracture (805 4) (S32 009A)   13  History of Mammogram normal    The active problems and past medical history were reviewed and updated today  Surgical History    1  History of Appendectomy   2  History of Hysterectomy    The surgical history was reviewed and updated today  Family History  Mother    1  Family history of hepatic cirrhosis (V18 59) (Z83 79)  Father    2  Family history of Bone cancer    The family history was reviewed and updated today         Social History    · Always uses seat belt   · Consumes alcohol occasionally (V49 89) (Z78 9)   · Current every day smoker (305 1) (F17 200)   ·    · No alcohol use  The social history was reviewed and updated today  Current Meds   1  Advil PM TABS; Therapy: (Recorded:28Apr2016) to Recorded   2  Aleve CAPS; Therapy: (María Porras) to Recorded   3  Aspirin 81 MG TABS; takes 1 tablet by mouth every other day; Therapy: (Recorded:16Jan2017) to Recorded   4  Calcium 600+D 600-200 MG-UNIT Oral Tablet; Therapy: (Recorded:55Jxb2365) to Recorded   5  Cyclobenzaprine HCl - 10 MG Oral Tablet; TAKE 1 TABLET AT BEDTIME AS NEEDED; Therapy: 17SBE2568 to (Evaluate:32Ppg3365)  Requested for: 20Mar2017; Last   Rx:20Mar2017 Ordered   6  Gabapentin 300 MG Oral Capsule; take 1 tab at bed time for 3 days; then twice a day   from day 4-6; then three times a day from day 7 onward; Therapy: 29Nax7029 to (Evaluate:21Apr2017)  Requested for: 20Feb2017; Last   Rx:56Qlb5002 Ordered   7  Metamucil POWD;   Therapy: (María Porras) to Recorded   8  Multi-Vitamin Oral Tablet; Therapy: (María Porras) to Recorded   9  PARoxetine HCl - 10 MG Oral Tablet; TAKE 1 TABLET DAILY AS DIRECTED  Requested   for: 93ZMV5101; Last Rx:16Jan2017 Ordered   10  TraZODone HCl - 50 MG Oral Tablet; 1/2 - 1 po hs prn  Requested for: 91NCI4581; Last    Rx:94Cul1298 Ordered    The medication list was reviewed and updated today  Allergies    1  No Known Drug Allergies    Vitals  Vital Signs    Recorded: 89BPB5794 02:18PM Recorded: 06ZPC2126 02:15PM   Temperature 96 9 F    Heart Rate 79 79   Systolic 650    Diastolic 76    Height 5 ft 1 in 5 ft 1 in   Weight 99 lb  99 lb 9 6 oz   BMI Calculated 18 71 18 82   BSA Calculated 1 4 1 4   O2 Saturation 89 89     Physical Exam    Constitutional   General appearance: Abnormal   Appears chronically ill  Eyes   Conjunctiva and lids: No swelling, erythema or discharge  Pupils and irises: Abnormal   Bilateral cataracts     Ears, Nose, Mouth, and Throat   External inspection of ears and nose: Normal     Otoscopic examination: Tympanic membranes translucent with normal light reflex  Canals patent without erythema  Nasal mucosa, septum, and turbinates: Normal without edema or erythema  Oropharynx: Normal with no erythema, edema, exudate or lesions  Pulmonary   Respiratory effort: No increased work of breathing or signs of respiratory distress  Auscultation of lungs: Abnormal   Decreased breath sounds bilaterally  Cardiovascular   Auscultation of heart: Normal rate and rhythm, normal S1 and S2, without murmurs  Examination of extremities for edema and/or varicosities: Normal     Musculoskeletal   Gait and station: Abnormal   Received in wheel chair, non ambulatory  Inspection/palpation of joints, bones, and muscles: Normal     Neurologic Pt with head neck and arm flailing during entire exam    Psychiatric   Orientation to person, place, and time: Normal     Mood and affect: Normal          Results/Data  (1) COMPREHENSIVE METABOLIC PANEL 27RTP6652 54:64TM mPura     Test Name Result Flag Reference   GLUCOSE 71 mg/dL  65-99   Fasting reference interval   UREA NITROGEN (BUN) 14 mg/dL  7-25   CREATININE 0 93 mg/dL  0 60-0 93   For patients >52years of age, the reference limit  for Creatinine is approximately 13% higher for people  identified as -American  eGFR NON-AFR   AMERICAN 60 mL/min/1 73m2  > OR = 60   eGFR AFRICAN AMERICAN 69 mL/min/1 73m2  > OR = 60   BUN/CREATININE RATIO   3-71   NOT APPLICABLE (calc)   SODIUM 127 mmol/L L 135-146   POTASSIUM 4 6 mmol/L  3 5-5 3   CHLORIDE 91 mmol/L L    CARBON DIOXIDE 30 mmol/L  20-31   CALCIUM 9 1 mg/dL  8 6-10 4   PROTEIN, TOTAL 7 1 g/dL  6 1-8 1   ALBUMIN 4 0 g/dL  3 6-5 1   GLOBULIN 3 1 g/dL (calc)  1 9-3 7   ALBUMIN/GLOBULIN RATIO 1 3 (calc)  1 0-2 5   BILIRUBIN, TOTAL 0 4 mg/dL  0 2-1 2   ALKALINE PHOSPHATASE 73 U/L     AST 25 U/L  10-35   ALT 10 U/L  6-29     (1) CBC/PLT/DIFF 11ZSR1166 12:31PM mPura     Test Name Result Flag Reference   WHITE BLOOD CELL COUNT 7 9 Thousand/uL 3  8-10 8   RED BLOOD CELL COUNT 4 26 Million/uL  3 80-5 10   HEMOGLOBIN 13 1 g/dL  11 7-15 5   HEMATOCRIT 39 1 %  35 0-45 0   MCV 91 7 fL  80 0-100 0   MCH 30 9 pg  27 0-33 0   MCHC 33 7 g/dL  32 0-36 0   RDW 14 7 %  11 0-15 0   PLATELET COUNT 091 Thousand/uL  140-400   MPV 7 0 fL L 7 5-12 5   ABSOLUTE NEUTROPHILS 5088 cells/uL  1606-3620   ABSOLUTE LYMPHOCYTES 1904 cells/uL  850-3900   ABSOLUTE MONOCYTES 727 cells/uL  200-950   ABSOLUTE EOSINOPHILS 126 cells/uL     ABSOLUTE BASOPHILS 55 cells/uL  0-200   NEUTROPHILS 64 4 %     LYMPHOCYTES 24 1 %     MONOCYTES 9 2 %     EOSINOPHILS 1 6 %     BASOPHILS 0 7 %       (Q) SED RATE BY MODIFIED Karissa Ramos [id number] 12:31PBARNEY Rivers     Test Name Result Flag Reference   SED RATE BY MODIFIEDTOSHIA 11 mm/h  < OR = 30     (Q) LYME DISEASE AB, TOTAL W/REFL WB (IGG, IGM) 55UKM3587 12:31P Jennifer Rivers   REPORT COMMENT:  FASTING:NO     Test Name Result Flag Reference   LYME AB SCREEN <0 90 index     Index                Interpretation                     -----                --------------                     < 0 90               Negative                     0  90-1 09            Equivocal                     > 1 09               Positive      As recommended by the Food and Drug Administration   (FDA), all samples with positive or equivocal   results in a Borrelia burgdorferi antibody screen  will be tested using a blot method  Positive or   equivocal screening test results should not be   interpreted as truly positive until verified as such   using a supplemental assay (e g , B  burgdorferi blot)  The screening test and/or blot for B  burgdorferi   antibodies may be falsely negative in early stages  of Lyme disease, including the period when erythema   migrans is apparent  * MRI BRAIN WO CONTRAST [id number] 02:15PM Chio Harding Order Number: [id number]    - Patient Instructions:  To schedule this appointment, please contact Central Scheduling at [phone]  Test Name Result Flag Reference   MRI BRAIN WO CONTRAST (Report)     MRI BRAIN WITHOUT CONTRAST     INDICATION: 77-year-old female, tremors   COMPARISON:  5/22/2014 [facility] MRI     TECHNIQUE: Sagittal T1, axial T2, axial FLAIR, axial T1, axial [place] and axial diffusion imaging  IMAGE QUALITY:    Mild to moderate degree of patient motion degrades the examination     FINDINGS:    BRAIN PARENCHYMA:    Mild to moderate chronic microvascular ischemic changes are present within the subcortical and deep white matter of the frontal and parietal lobes bilaterally  These findings are minimally progressive  There is a dilated sulcus at the left frontal convexity which may be related to small arachnoid cyst, unchanged  No acute ischemic disease is identified  Age-appropriate cerebral atrophy is present  There is no discrete mass, mass effect or midline shift  No hemorrhage  Brainstem and cerebellum demonstrate normal signal  Diffusion imaging is unremarkable  VENTRICLES: The ventricles are normal in size and contour  SELLA AND PITUITARY GLAND: Normal      ORBITS: Normal      PARANASAL SINUSES: The paranasal sinuses are clear  VASCULATURE: Evaluation of the major intracranial vasculature demonstrates appropriate flow voids  CALVARIUM AND SKULL BASE: Right mastoid effusion     EXTRACRANIAL SOFT TISSUES: Normal        IMPRESSION:   Minimally progressive mild to moderate chronic microvascular ischemic disease     No acute ischemic disease     Age-related atrophy     Probable small left frontal convexity arachnoid cyst     Right mastoid effusion     Similar to prior study       Workstation performed: [id number]     Signed by:   Reymundo Cardenas MD   3/17/17     * MRI CERVICAL SPINE WO CONTRAST [id number] 02:15PM Caleb Dumont Order Number: [id number]    - Patient Instructions: To schedule this appointment, please contact Central Scheduling at [phone]       Test Name Result Flag Reference   MRI CERVICAL SPINE WO CONTRAST (Report)     MRI CERVICAL SPINE WITHOUT CONTRAST     INDICATION: 44-year-old female, tremors   COMPARISON: None  TECHNIQUE: Sagittal T1, sagittal T2, sagittal inversion recovery, axial T2, axial 2D merge        IMAGE QUALITY: Diagnostic     FINDINGS:     ALIGNMENT:   Minimal grade 1 degenerative retrolisthesis C5-6    No compression fracture  No scoliosis  MARROW SIGNAL:  Small focus of abnormal signal intensity left inferior endplate of C5, likely degenerative  No significant marrow pathology confirmed  CERVICAL AND VISUALIZED THORACIC CORD: Normal signal within the visualized cord  PREVERTEBRAL AND PARASPINAL SOFT TISSUES: Prevertebral and paraspinal soft tissues are unremarkable  VISUALIZED POSTERIOR FOSSA: The visualized posterior fossa demonstrates no abnormal signal      CERVICAL DISC SPACES:        C2-C3: Normal      C3-C4: Mild degenerative disc and facet disease, no stenosis     C4-C5: Mild degenerative disc and facet disease, no stenosis     C5-C6: Mild degenerative disc disease, mild to moderate degenerative facet hypertrophy, grade 1 retrolisthesis, mild to moderate bilateral foraminal stenosis, possible bilateral C6 nerve root encroachment     C6-C7: Mild degenerative disc and facet disease, no stenosis     C7-T1: Normal      UPPER THORACIC DISC SPACES: Normal        IMPRESSION:   Multilevel degenerative spondylosis     Grade 1 retrolisthesis, mild to moderate bilateral foraminal stenosis C5-6       Workstation performed: [id number]     Signed by:   Emeka Miranda MD   3/20/17     Future Appointments    Date/Time Provider Specialty Site   05/03/2017 01:15 PM Sonido Renner MD Pain Management [facility]   05/01/2017 04:20 PM Tiara Martinez MD Neurology [facility] [address]   Electronically signed by : ELMA Boston;  Apr 4 2017  3:12PM EST                       (Author)

## 2018-01-19 ENCOUNTER — TRANSCRIBE ORDERS (OUTPATIENT)
Dept: LAB | Facility: HOSPITAL | Age: 78
End: 2018-01-19

## 2018-01-19 DIAGNOSIS — I10 ESSENTIAL HYPERTENSION, MALIGNANT: Primary | ICD-10-CM

## 2018-01-22 VITALS
TEMPERATURE: 96.5 F | SYSTOLIC BLOOD PRESSURE: 120 MMHG | HEART RATE: 76 BPM | WEIGHT: 99 LBS | DIASTOLIC BLOOD PRESSURE: 82 MMHG | HEIGHT: 59 IN | RESPIRATION RATE: 15 BRPM | OXYGEN SATURATION: 84 % | BODY MASS INDEX: 19.96 KG/M2

## 2018-01-22 VITALS
DIASTOLIC BLOOD PRESSURE: 76 MMHG | BODY MASS INDEX: 18.69 KG/M2 | WEIGHT: 99 LBS | HEART RATE: 79 BPM | OXYGEN SATURATION: 89 % | SYSTOLIC BLOOD PRESSURE: 130 MMHG | TEMPERATURE: 96.9 F | HEIGHT: 61 IN

## 2018-01-22 VITALS
WEIGHT: 96 LBS | BODY MASS INDEX: 19.35 KG/M2 | DIASTOLIC BLOOD PRESSURE: 102 MMHG | SYSTOLIC BLOOD PRESSURE: 155 MMHG | HEIGHT: 59 IN | HEART RATE: 107 BPM

## 2018-01-23 NOTE — MISCELLANEOUS
Message   Recorded as Task   Date: 12/18/2017 10:49 AM, Created By: Brittney Hilton   Task Name: Miscellaneous   Assigned To: [facility] es clinical,Team   Regarding Patient: Day Wong, Status: In Progress   Comment:    CarmenfreddyLuzmaria - 18 Dec 2017 10:49 AM     TASK CREATED  Pt's  Emili Alexander called stating [facility] called and told him Dr Balaji Keyes is refusing to fill pt's Gabapentin  Emili Benjamin said pt has enough for a couple of days  Pls call Emili Alexander at [phone]  Claudio Booker - 18 Dec 2017 10:59 AM     TASK IN PROGRESS   Kyung Springer - 18 Dec 2017 11:02 AM     TASK EDITED  S/w pt's  and advised to call [facility] in the future for refills when pt is running low on meds  He states pt tolerating dose and has f/u ov 2/6  Please send refill gabapentin to pharmacy to get pt to f/u ov  [employer] - 05 Dec 2017 9:43 AM     TASK REPLIED TO: Previously Assigned To [employer]  sent   Claudio Booker - 19 Dec 2017 9:44 AM     TASK IN PROGRESS   Kyung Springer - 19 Dec 2017 9:47 AM     TASK EDITED  Pt's  made aware  Active Problems    1  Abdominal pain (789 00) (R10 9)   2  Abnormal involuntary movements (781 0) (R25 9)   3  Anxiety state (300 00) (F41 1)   4  Aortic aneurysm (441 9) (I71 9)   5  At risk for falls (V15 88) (Z91 81)   6  Cataract, bilateral (366 9) (H26 9)   7  Chronic pain syndrome (338 4) (G89 4)   8  Closed wedge fracture of thoracic vertebra (805 2) (S22 000A)   9  COPD, 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)   10  Encounter for special screening examination for genitourinary disorder (V81 6) (Z13 89)   11  Flu vaccine need (V04 81) (Z23)   12  Generalized weakness (780 79) (R53 1)   13  HTN (hypertension) (401 9) (I10)   14  Hyponatremia (276 1) (E87 1)   15  Insomnia, unspecified type (780 52) (G47 00)   16  Irritable bowel syndrome without diarrhea (564 1) (K58 9)   17  Medicare annual wellness visit, subsequent (V70 0) (Z00 00)   18  Osteoporosis (733 00) (M81 0)   19   Panic disorder (300 01) (F41 0)   20  Thoracic radiculopathy (724 4) (M54 14)    Current Meds   1  Advil PM TABS; TAKE 2 TABLETS AT BEDTIME; Therapy: (Recorded:20Nov2017) to Recorded   2  Align CAPS; Therapy: (Recorded:20Nov2017) to Recorded   3  Aspirin 81 MG TABS; takes 1 tablet by mouth every other day; Therapy: (Recorded:16Jan2017) to Recorded   4  Calcium 600+D 600-200 MG-UNIT Oral Tablet; TAKE TABLET  every other day; Therapy: (Recorded:20Nov2017) to Recorded   5  ClonazePAM 0 5 MG Oral Tablet; Take 1 tablet twice daily; Therapy: (Recorded:20Nov2017) to Recorded   6  Cyclobenzaprine HCl - 10 MG Oral Tablet; TAKE 1 TABLET AT BEDTIME AS NEEDED; Therapy: 15DNM0928 to [phone]  Requested for: 04Add2059; Last   Rx:97Eri7203 Ordered   7  Gabapentin 300 MG Oral Capsule; take 1 capsule three times a day; Therapy: 05Bma2544 to (Evaluate:19Mar2018)  Requested for: 71SPT1903; Last   Rx:34Ybm6022; Status: ACTIVE - Transmit to Pharmacy - Awaiting Verification Ordered   8  Metamucil POWD;   Therapy: (Stephania Tony) to Recorded   9  Multi-Vitamin Oral Tablet; Therapy: (Recorded:28Apr2016) to Recorded   10  TraMADol HCl - 50 MG Oral Tablet; TAKE 1 TABLET 3 TIMES DAILY; Therapy: 19FPN4894 to (Evaluate:71Che0371); Last Rx:13Nov2017 Ordered   11  TraZODone HCl - 50 MG Oral Tablet; 1/2 - 1 po hs prn  Requested for: 16UGP7248; Last    Rx:00Dux9122 Ordered    Allergies    1  No Known Drug Allergies    2  No Known Environmental Allergies   3   No Known Food Allergies    Signatures   Electronically signed by : Marivel Frank, ; Dec 19 2017  9:48AM EST                       (Author)

## 2018-01-23 NOTE — PROGRESS NOTES
Assessment    1  Abnormal involuntary movements (781 0) (R25 9)   2  Hyponatremia (276 1) (E87 1)   3  Cataract, bilateral (366 9) (H26 9)   4  Osteoporosis (733 00) (M81 0)   5  Medicare annual wellness visit, subsequent (V70 0) (Z00 00)   6  At risk for falls (V15 88) (Z91 81)    Plan  Abnormal involuntary movements    · Follow-up visit in 4 Months Evaluation and Treatment  Follow-up  Status: Hold For -  Scheduling  Requested for: 89CQN9398  At risk for falls    · There ways to avoid falling ; Status:Complete;   Done: 35ZGR1215   · These are things you can do to prevent falls in and around the home ; Status:Complete;    Done: 11TIJ0640  HTN (hypertension)    · (1) LIPID PANEL FASTING W DIRECT LDL REFLEX; Status:Active; Requested  for:17Nov2017;    · (1) URINALYSIS w URINE C/S REFLEX (will reflex a microscopy if leukocytes, occult  blood, or nitrites are not within normal limits); Status:Active; Requested for:17Nov2017;   Hyponatremia    · (1) COMPREHENSIVE METABOLIC PANEL; Status:Active; Requested for:17Nov2017;   Osteoporosis    · (LC) Vitamin D 25-Hydroxy, D2 + D3; Status:Active; Requested for:17Nov2017;   Panic disorder    · ClonazePAM 0 5 MG Oral Tablet; Take 1 tablet daily and 1/2 at night    Discussion/Summary    Read over paper work given  Reconsider if you would like treatment for your osteoporosis  Impression: Subsequent Annual Wellness Visit  Cardiovascular screening and counseling: the risks and benefits of screening were discussed and screening is current  Diabetes screening and counseling: the risks and benefits of screening were discussed and screening is current  Colorectal cancer screening and counseling: the risks and benefits of screening were discussed and screening is current  Breast cancer screening and counseling: the risks and benefits of screening were discussed and the patient declines screening     Cervical cancer screening and counseling: the risks and benefits of screening were discussed and the patient declines screening  Osteoporosis screening and counseling: the risks and benefits of screening were discussed, screening is current and Pt refuses meds for osteoporosis  Abdominal aortic aneurysm screening and counseling: the risks and benefits of screening were discussed and Pt refusing w/u for AAA  Glaucoma screening and counseling: the risks and benefits of screening were discussed and Strongly suggest eye exam    HIV screening and counseling: the risks and benefits of screening were discussed and the patient is pregnant  Hepatitis C Screening: the patient was counseled on Hepatitis C screening  The patient declines Hepatitis C screening  Immunizations: the risks and benefits of influenza vaccination were discussed with the patient, the patient declines the influenza vaccination, the risks and benefits of pneumococcal vaccination were discussed with the patient, the patient declines the pneumococcal vaccination, the risks and benefits of the Zostavax vaccine were discussed with the patient, the patient declines the Zostavax vaccine, the risks and benefits of the Tdap vaccine were discussed with the patient and the patient declines the Tdap vaccine  Advance Directive Planning: complete and up to date, paperwork and instructions were given to the patient, she was encouraged to follow-up with me to discuss her questions and/or decisions, POLST and 5 wishes given  Patient Discussion: plan discussed with the patient, follow-up visit needed in one year  Chief Complaint  - patient is in for annual wellness exam      History of Present Illness  HPI: Pt is here for her annual wellness exam    Welcome to Medicare and Wellness Visits: The patient is being seen for the initial annual wellness visit  Medicare Screening and Risk Factors   Hospitalizations: no previous hospitalizations     Once per lifetime medicare screening tests: ECG and AAA screening US has not yet been done   Medicare Screening Tests Risk Questions   Abdominal aortic aneurysm risk assessment: Pt refuses further w/u for her aneurysm  , but none indicated  Osteoporosis risk assessment: over 48years of age, the patient's weight is too low (<127 lbs) and past medical history of fracture(s)  HIV risk assessment: none indicated  Drug and Alcohol Use: The patient currently smokes 1 packs per day  She has smoked for year(s)  The patient reports rare alcohol use  She has never used illicit drugs  Diet and Physical Activity: Current diet includes well balanced meals, 1 servings of fruit per day, 1 servings of vegetables per day, 1 servings of dairy products per day and 3 cups of coffee per day  The patient does not exercise  Exercise: walking 7 hours per week  Mood Disorder and Cognitive Impairment Screening: PHQ-9 Depression Scale   Over the past 2 weeks, how often have you been bothered by the following problems? 1 ) Little interest or pleasure in doing things? Not at all    2 ) Feeling down, depressed or hopeless? Several days  3 ) Trouble falling asleep or sleeping too much? Not at all    4 ) Feeling tired or having little energy? Several days  5 ) Poor appetite or overeating? Not at all    6 ) Feeling bad about yourself, or that you are a failure, or have let yourself or your family down? Not at all    7 ) Trouble concentrating on things, such as reading a newspaper or watching television? Not at all    8 ) Moving or speaking so slowly that other people could have noticed, or the opposite, moving or speaking faster than usual? Not at all  TOTAL SCORE: 2  How difficult have these problems made it for you to do your work, take care of things at home, or get along with people? Somewhat difficult  Anxiety screening score was  She reports feeling down, depressed, or hopeless over the past two weeks  She denies feeling little interest or pleasure in doing things over the past two weeks     Functional Ability/Level of Safety: Hearing is significantly decreased, significantly decreased in the right ear and significantly decreased in the left ear  She reports hearing difficulties  She does not use a hearing aid  The patient is currently able to do activities of daily living with limitations, able to do instrumental activities of daily living with limitations, able to participate in social activities with limitations and unable to drive  Activities of daily living details: transportation help needed, needs help shopping, meal preparation help needed, needs help doing housework, needs help doing laundry and needs help managing money, but does not need help using the phone and does not need help managing medications  Fall risk factors: The patient fell 2 times in the past 12 months  Injury History: mobility impairment  Home safety risk factors:  no grab bars in the bathroom, but no unfamiliar surroundings, no loose rugs, no poor household lighting, no uneven floors and handrails on the stairs  Advance Directives: Advance directives: living will, durable power of  for health care directives and advance directives  end of life decisions were reviewed with the patient and I disagree with the patient's decisions  Concerns with the patient's end of life decisions: Pt wants resuscitation even if she is brain dead  Co-Managers and Medical Equipment/Suppliers: See Patient Care Team   Preventive Quality Program 65 and Older: Falls Risk: The patient fell 0 times in the past 12 months  Symptoms Include: visual problems and leg weakness    The patient currently has no urinary incontinence symptoms     Date of last glaucoma screen was never      Patient Care Team    Care Team Member Role Specialty Office Number   Akua Beltre, [address] [phone]   [facility]  Thoracic Surgery [phone]   Robert Magaña MD Specialist Pain Management [phone]   [facility], [facility] Medicine [phone]   Miguelito Lynne MD Specialist Neurology [phone]   Faviola Nascimento MD Specialist Gastroenterology Adult [phone]     Active Problems    1  Abdominal pain (789 00) (R10 9)   2  Abnormal involuntary movements (781 0) (R25 9)   3  Anxiety state (300 00) (F41 1)   4  Aortic aneurysm (441 9) (I71 9)   5  Cataract, bilateral (366 9) (H26 9)   6  Chronic pain syndrome (338 4) (G89 4)   7  Closed wedge fracture of thoracic vertebra (805 2) (S22 000A)   8  COPD, 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)   9  Encounter for special screening examination for genitourinary disorder (V81 6) (Z13 89)   10  Flu vaccine need (V04 81) (Z23)   11  Generalized weakness (780 79) (R53 1)   12  HTN (hypertension) (401 9) (I10)   13  Hyponatremia (276 1) (E87 1)   14  Insomnia, unspecified type (780 52) (G47 00)   15  Irritable bowel syndrome without diarrhea (564 1) (K58 9)   16  Osteoporosis (733 00) (M81 0)   17  Panic disorder (300 01) (F41 0)   18   Thoracic radiculopathy (724 4) (M54 14)    Past Medical History    · History of Accidental fall (E888 9) (W19 XXXA)   · History of Anxiety (300 00) (F41 9)   · History of Benign essential hypertension (401 1) (I10)   · History of Colonoscopy (Fiberoptic)   · History of Compression fracture of thoracic vertebra (805 2) (S22 000A)   · History of Failure to thrive in adult (783 7) (R62 7)   · History of bone scan (V15 89) (Z92 89)   · History of chronic obstructive lung disease (V12 69) (Z87 09)   · History of depression (V11 8) (Z86 59)   · History of dysthymia (V11 8) (Z86 59)   · History of Hyponatremia (276 1) (E87 1)   · History of Lumbar vertebral fracture (805 4) (S32 009A)   · History of Mammogram normal    Surgical History    · History of Appendectomy   · History of Hysterectomy    Family History  Mother    · Family history of hepatic cirrhosis (V18 59) (Z83 79)  Father    · Family history of Bone cancer    Social History    · Always uses seat belt   · Consumes alcohol occasionally (V49 89) (Z78 9)   · Current every day smoker (305 1) (F17 200)   ·    · No alcohol use    Current Meds   1  Advil 200 MG Oral Tablet; Take 2 pills in the am with food; Therapy: 64JVS5748 to (Last GH:41MWH1811) Ordered   2  Aspirin 81 MG TABS; takes 1 tablet by mouth every other day; Therapy: (Recorded:16Jan2017) to Recorded   3  Calcium 600+D 600-200 MG-UNIT Oral Tablet; Therapy: (Recorded:22Xxb7234) to Recorded   4  ClonazePAM 0 5 MG Oral Tablet; Take 1 tablet daily and 1/2 at night; Last Rx:02Qsw7897   Ordered   5  Cyclobenzaprine HCl - 10 MG Oral Tablet; TAKE 1 TABLET AT BEDTIME AS NEEDED; Therapy: 70LLP6938 to (21 )  Requested for: 22Thp2728; Last   Rx:65Sqs6424 Ordered   6  Gabapentin 300 MG Oral Capsule; take 1 capsule three times a day; Therapy: 00Qjx1615 to (Evaluate:04Tcp0682)  Requested for: 95Ojg0204; Last   Rx:22Tpp4356 Ordered   7  Metamucil POWD;   Therapy: (Dana Back) to Recorded   8  Multi-Vitamin Oral Tablet; Therapy: (Dana Back) to Recorded   9  TraMADol HCl - 50 MG Oral Tablet; TAKE 1 TABLET 3 TIMES DAILY; Therapy: 13AGC4452 to (Evaluate:07Bnc8451); Last Rx:94Kma3548 Ordered   10  TraZODone HCl - 50 MG Oral Tablet; 1/2 - 1 po hs prn  Requested for: 72KFA7714; Last    Rx:93Sge2184 Ordered    Allergies    1  No Known Drug Allergies    2  No Known Environmental Allergies   3  No Known Food Allergies    Immunizations   1    Influenza  19-Sep-2016     Vitals  Signs    Temperature: 96 5 F  Heart Rate: 76  Respiration: 15  Systolic: 083  Diastolic: 82  Height: 4 ft 11 in  Weight: 99 lb   BMI Calculated: 20  BSA Calculated: 1 37  O2 Saturation: 84    Results/Data  PHQ-9 Adult Depression Screening 91CBP9358 03:21PM User, Ahs     Test Name Result Flag Reference   PHQ-9 Adult Depression Score 2     Over the last two weeks, how often have you been bothered by any of the following problems?   Little interest or pleasure in doing things: Not at all - 0  Feeling down, depressed, or hopeless: Several days - 1  Trouble falling or staying asleep, or sleeping too much: Not at all - 0  Feeling tired or having little energy: Several days - 1  Poor appetite or over eating: Not at all - 0  Feeling bad about yourself - or that you are a failure or have let yourself or your family down: Not at all - 0  Trouble concentrating on things, such as reading the newspaper or watching television: Not at all - 0  Moving or speaking so slowly that other people could have noticed  Or the opposite -  being so fidgety or restless that you have been moving around a lot more than usual: Not at all - 0  Thoughts that you would be better off dead, or of hurting yourself in some way: Not at all - 0   PHQ-9 Adult Depression Screening Negative     PHQ-9 Difficulty Level Not difficult at all     PHQ-9 Severity Minimal Depression         Attending Note  Collaborating Physician Note: Collaborating Note: I supervised the Advanced Practitioner and I agree with the Advanced Practitioner note  Future Appointments    Date/Time Provider Specialty Site   02/06/2018 02:45 PM ELMA Flores Pain Management [facility]   04/06/2018 09:00 AM Qian Guerrero MD Neurology [address] [facility]   11/20/2017 12:40 PM Mallory Dueñas MD Neurology [address]   12/18/2017 03:00 PM BARNEY Pandey  Nephrology [address] [facility]     Signatures   Electronically signed by :  ELMA Lance; Nov 17 2017  4:08PM EST                       (Author)    Electronically signed by : Faby Gambino DO; Nov 17 2017  4:13PM EST                       (Co-author)

## 2018-01-24 ENCOUNTER — APPOINTMENT (OUTPATIENT)
Dept: LAB | Facility: HOSPITAL | Age: 78
End: 2018-01-24
Payer: MEDICARE

## 2018-01-24 DIAGNOSIS — I10 ESSENTIAL (PRIMARY) HYPERTENSION: ICD-10-CM

## 2018-01-24 DIAGNOSIS — E87.1 HYPO-OSMOLALITY AND HYPONATREMIA (CODE): ICD-10-CM

## 2018-01-24 DIAGNOSIS — I10 ESSENTIAL HYPERTENSION, MALIGNANT: ICD-10-CM

## 2018-01-24 LAB
25(OH)D3 SERPL-MCNC: 52.6 NG/ML (ref 30–100)
ALBUMIN SERPL BCP-MCNC: 3.6 G/DL (ref 3.5–5)
ALP SERPL-CCNC: 73 U/L (ref 46–116)
ALT SERPL W P-5'-P-CCNC: 18 U/L (ref 12–78)
ANION GAP SERPL CALCULATED.3IONS-SCNC: 4 MMOL/L (ref 4–13)
AST SERPL W P-5'-P-CCNC: 20 U/L (ref 5–45)
BACTERIA UR QL AUTO: ABNORMAL /HPF
BILIRUB SERPL-MCNC: 0.32 MG/DL (ref 0.2–1)
BILIRUB UR QL STRIP: NEGATIVE
BUN SERPL-MCNC: 10 MG/DL (ref 5–25)
CALCIUM SERPL-MCNC: 9.3 MG/DL (ref 8.3–10.1)
CHLORIDE SERPL-SCNC: 102 MMOL/L (ref 100–108)
CHOLEST SERPL-MCNC: 198 MG/DL (ref 50–200)
CLARITY UR: CLEAR
CO2 SERPL-SCNC: 29 MMOL/L (ref 21–32)
COLOR UR: YELLOW
CREAT SERPL-MCNC: 0.81 MG/DL (ref 0.6–1.3)
GFR SERPL CREATININE-BSD FRML MDRD: 70 ML/MIN/1.73SQ M
GLUCOSE P FAST SERPL-MCNC: 84 MG/DL (ref 65–99)
GLUCOSE UR STRIP-MCNC: NEGATIVE MG/DL
HDLC SERPL-MCNC: 60 MG/DL (ref 40–60)
HGB UR QL STRIP.AUTO: NEGATIVE
HYALINE CASTS #/AREA URNS LPF: ABNORMAL /LPF
KETONES UR STRIP-MCNC: NEGATIVE MG/DL
LDLC SERPL CALC-MCNC: 112 MG/DL (ref 0–100)
LEUKOCYTE ESTERASE UR QL STRIP: ABNORMAL
NITRITE UR QL STRIP: NEGATIVE
NON-SQ EPI CELLS URNS QL MICRO: ABNORMAL /HPF
PH UR STRIP.AUTO: 6.5 [PH] (ref 4.5–8)
POTASSIUM SERPL-SCNC: 4.4 MMOL/L (ref 3.5–5.3)
PROT SERPL-MCNC: 7.6 G/DL (ref 6.4–8.2)
PROT UR STRIP-MCNC: NEGATIVE MG/DL
RBC #/AREA URNS AUTO: ABNORMAL /HPF
SODIUM SERPL-SCNC: 135 MMOL/L (ref 136–145)
SP GR UR STRIP.AUTO: 1.01 (ref 1–1.03)
TRIGL SERPL-MCNC: 129 MG/DL
UROBILINOGEN UR QL STRIP.AUTO: 0.2 E.U./DL
VENIPUNCTURE: NORMAL
WBC #/AREA URNS AUTO: ABNORMAL /HPF

## 2018-01-24 PROCEDURE — 36415 COLL VENOUS BLD VENIPUNCTURE: CPT

## 2018-01-24 PROCEDURE — 80061 LIPID PANEL: CPT

## 2018-01-24 PROCEDURE — 81001 URINALYSIS AUTO W/SCOPE: CPT

## 2018-01-24 PROCEDURE — 80053 COMPREHEN METABOLIC PANEL: CPT

## 2018-01-24 PROCEDURE — 82306 VITAMIN D 25 HYDROXY: CPT

## 2018-02-06 ENCOUNTER — OFFICE VISIT (OUTPATIENT)
Dept: PAIN MEDICINE | Facility: CLINIC | Age: 78
End: 2018-02-06
Payer: MEDICARE

## 2018-02-06 VITALS
HEIGHT: 61 IN | SYSTOLIC BLOOD PRESSURE: 108 MMHG | TEMPERATURE: 98.2 F | BODY MASS INDEX: 18.88 KG/M2 | HEART RATE: 72 BPM | DIASTOLIC BLOOD PRESSURE: 68 MMHG | WEIGHT: 100 LBS

## 2018-02-06 DIAGNOSIS — S22.000G CLOSED WEDGE FRACTURE OF THORACIC VERTEBRA WITH DELAYED HEALING, UNSPECIFIED THORACIC VERTEBRAL LEVEL, SUBSEQUENT ENCOUNTER: ICD-10-CM

## 2018-02-06 DIAGNOSIS — G89.4 CHRONIC PAIN DISORDER: Primary | ICD-10-CM

## 2018-02-06 DIAGNOSIS — M54.14 THORACIC RADICULOPATHY: ICD-10-CM

## 2018-02-06 PROBLEM — H26.9 CATARACT, BILATERAL: Status: ACTIVE | Noted: 2017-04-04

## 2018-02-06 PROBLEM — S22.000A CLOSED WEDGE FRACTURE OF THORACIC VERTEBRA (HCC): Status: ACTIVE | Noted: 2017-06-30

## 2018-02-06 PROCEDURE — 99214 OFFICE O/P EST MOD 30 MIN: CPT | Performed by: NURSE PRACTITIONER

## 2018-02-06 RX ORDER — TRAMADOL HYDROCHLORIDE 50 MG/1
50 TABLET ORAL EVERY 8 HOURS PRN
Qty: 90 TABLET | Refills: 1 | Status: SHIPPED | OUTPATIENT
Start: 2018-03-02 | End: 2018-06-08 | Stop reason: SDUPTHER

## 2018-02-06 RX ORDER — CLONAZEPAM 0.5 MG/1
0.5 TABLET ORAL 2 TIMES DAILY
Refills: 2 | COMMUNITY
Start: 2018-01-26 | End: 2018-02-26 | Stop reason: SDUPTHER

## 2018-02-06 RX ORDER — GABAPENTIN 300 MG/1
300 CAPSULE ORAL 3 TIMES DAILY
Qty: 30 CAPSULE | Refills: 0 | Status: SHIPPED | OUTPATIENT
Start: 2018-02-06 | End: 2018-03-14 | Stop reason: SDUPTHER

## 2018-02-06 RX ORDER — ALUMINUM ZIRCONIUM OCTACHLOROHYDREX GLY 16 G/100G
GEL TOPICAL
COMMUNITY
End: 2019-01-22

## 2018-02-06 RX ORDER — TRAZODONE HYDROCHLORIDE 50 MG/1
TABLET ORAL
COMMUNITY
End: 2018-04-25 | Stop reason: SDUPTHER

## 2018-02-06 RX ORDER — CYCLOBENZAPRINE HCL 10 MG
10 TABLET ORAL
Qty: 30 TABLET | Refills: 1 | Status: SHIPPED | OUTPATIENT
Start: 2018-02-06 | End: 2018-05-11 | Stop reason: SDUPTHER

## 2018-02-06 RX ORDER — TRAMADOL HYDROCHLORIDE 50 MG/1
TABLET ORAL
COMMUNITY
Start: 2018-02-02 | End: 2018-02-06 | Stop reason: SDUPTHER

## 2018-02-06 RX ORDER — PYRIDOXINE HCL (VITAMIN B6) 100 MG
TABLET ORAL DAILY
COMMUNITY

## 2018-02-06 RX ORDER — CYCLOBENZAPRINE HCL 10 MG
1 TABLET ORAL
COMMUNITY
Start: 2017-03-20 | End: 2018-02-06 | Stop reason: SDUPTHER

## 2018-02-06 RX ORDER — GABAPENTIN 300 MG/1
1 CAPSULE ORAL 3 TIMES DAILY
COMMUNITY
Start: 2017-02-20 | End: 2018-02-06 | Stop reason: SDUPTHER

## 2018-02-06 NOTE — PROGRESS NOTES
Assessment:  1  Chronic pain disorder    2  Thoracic radiculopathy    3  Closed wedge fracture of thoracic vertebra with delayed healing, unspecified thoracic vertebral level, subsequent encounter        Plan:  The patient is a 45-year-old female with a history of chronic pain syndrome secondary to thoracic radiculopathy, stemming from multiple thoracic and lumbar compression fractures causing radicular symptoms  Patient reports that she is currently well managed on her current medication regimen for her pain  Therefore the patient be continued on tramadol 50 mg 1 tablet 3 times daily as needed for pain  Patient was given a prescription for next month with do not fill date of 3/2/2018, with 1 refill  Patient will also be continued on cyclobenzaprine 10 mg 1 tablet at bedtime, and gabapentin 300 mg 1 tablet 3 times daily  Refills for these medications were sent electronically to the patient's pharmacy  Patient did not bring her prescription pill bottle to this office visit  Patient was instructed to bring her prescription pill bottle to each office visit per office policy for possible random pill count, now is required for future refills  Patient verbalized understanding  The patient also takes Klonopin as prescribed by another prescriber  Patient was instructed not to take her benzodiazepine medications concurrently with her opioid medications, due to the increased risk of respiratory depression and possible death  Patient verbalized understanding, and stated that she would not take her benzodiazepine medications concurrently with her opioid medications  The patient will follow up in 12 weeks, or sooner with the worsening symptoms  There are risks associated with opioid medications, including dependence, addiction and tolerance  The patient understands and agrees to use these medications only as prescribed   Potential side effects of the medications include, but are not limited to, constipation, drowsiness, addiction, impaired judgment and risk of fatal overdose if not taken as prescribed  The patient was warned against driving while taking sedation medications  Sharing medications is a felony  At this point in time, the patient is showing no signs of addiction, abuse, diversion or suicidal ideation  [place] Prescription Drug Monitoring Program report was reviewed and was appropriate     My impressions and treatment recommendations were discussed in detail with the patient who verbalized understanding and had no further questions  Discharge instructions were provided  I personally saw and examined the patient and I agree with the above discussed plan of care  No orders of the defined types were placed in this encounter      New Medications Ordered This Visit   Medications    Ibuprofen-Diphenhydramine Cit (ADVIL PM) 200-38 MG TABS     Sig: Take 2 tablets by mouth    bifidobacterium infantis (ALIGN) capsule     Sig: Take by mouth    aspirin 81 MG tablet     Sig: Take by mouth    Calcium Carb-Cholecalciferol (CALCIUM 600 + D) 600-200 MG-UNIT TABS     Sig: Take by mouth    clonazePAM (KlonoPIN) 0 5 mg tablet     Sig: Take 0 5 mg by mouth 2 (two) times a day     Refill:  2    Psyllium (METAMUCIL) 28 3 % POWD     Sig: Take by mouth    Multiple Vitamins-Minerals (MULTIVITAMIN ADULT PO)     Sig: Take by mouth    traZODone (DESYREL) 50 mg tablet     Sig: Take by mouth    cyclobenzaprine (FLEXERIL) 10 mg tablet     Sig: Take 1 tablet (10 mg total) by mouth daily at bedtime     Dispense:  30 tablet     Refill:  1    gabapentin (NEURONTIN) 300 mg capsule     Sig: Take 1 capsule (300 mg total) by mouth 3 (three) times a day for 30 days     Dispense:  30 capsule     Refill:  0    traMADol (ULTRAM) 50 mg tablet     Sig: Take 1 tablet (50 mg total) by mouth every 8 (eight) hours as needed for moderate pain     Dispense:  90 tablet     Refill:  1       History of Present Illness:  Nikolas Steel Komal Samayoa is a 68 y o  female who presents for a follow up office visit in regards to Back Pain (low back)  The patient has a history of chronic pain syndrome secondary to multiple thoracic and lumbar compression fractures, and thoracic radiculopathy  The patients current symptoms include Patient reports that the pain starts in her back and radiates into her abdomen, and into her bilateral groin  Patient has bilateral leg weakness, and is wheelchair bound  Patient denies bowel or bladder issues  She describes her pain as sharp intermittent pain that occurs throughout the day  Patient reports that her pain is symptoms are unchanged since last visit patient currently rates her pain a 4 to 5/10 numeric pain scale  Current pain medications includes:  Tramadol 50 1 tablet 3 times a day as needed  The patient reports that this regimen is providing 60% pain relief  The patient is reporting sleepiness, constipation from this pain medication regimen  Patient is taking Miralax for constipation with positive results  Pain Contract Signed: 02/06/2018, Last Urine Drug Screen: 08/30/2017    I have personally reviewed and/or updated the patient's past medical history, past surgical history, family history, social history, current medications, allergies, and vital signs today  Review of Systems   Respiratory: Negative for shortness of breath  Cardiovascular: Negative for chest pain  Gastrointestinal: Negative for constipation, diarrhea, nausea and vomiting  Musculoskeletal: Negative for arthralgias, gait problem, joint swelling and myalgias  Skin: Negative for rash  Neurological: Negative for dizziness, seizures and weakness  All other systems reviewed and are negative        Patient Active Problem List   Diagnosis    Abdominal pain    Abnormal involuntary movements    Aortic aneurysm (HCC)    Cataract, bilateral    Chronic pain disorder    Closed wedge fracture of thoracic vertebra (HCC)    COPD, mild (HCC)    Generalized weakness    HTN (hypertension)    Hyponatremia    Irritable bowel syndrome without diarrhea    Osteoporosis    Thoracic radiculopathy       No past surgical history on file  No family history on file  Social History     Occupational History    Not on file  Social History Main Topics    Smoking status: Not on file    Smokeless tobacco: Not on file    Alcohol use Not on file    Drug use: Unknown    Sexual activity: Not on file       No current outpatient prescriptions on file prior to visit  No current facility-administered medications on file prior to visit  No Known Allergies    Physical Exam:    /68   Pulse 72   Temp 98 2 °F (36 8 °C)   Ht 5' 1" (1 549 m)   Wt 45 4 kg (100 lb)   BMI 18 89 kg/m²     Constitutional:normal, well developed, well nourished, alert, in no distress and non-toxic and no overt pain behavior   and underweight  Eyes:anicteric  HEENT:grossly intact  Neck:supple, symmetric, trachea midline and no masses   Pulmonary:even and unlabored  Cardiovascular:No edema or pitting edema present  Skin:Normal without rashes or lesions and well hydrated  Psychiatric:Mood and affect appropriate  Neurologic:Cranial Nerves II-XII grossly intact  Musculoskeletal:normal and in wheelchair     Thoracic Spine Exam    Appearance:  Normal lordosis  Palpation/Tenderness:  left thoracic paraspinal tenderness  right thoracic paraspinal tenderness  Sensory:  no sensory deficits noted  Range of Motion:  Flexion:  Minimally limited  with pain  Extension:  Minimally limited  with pain  Lateral Flexion - Left:  Minimally limited  with pain  Lateral Flexion - Right:  No limitation  with pain  Rotation - Left:  Minimally limited  with pain  Rotation - Right:  Minimally limited  with pain  Motor Strength:  Left hip flexion:  3/5  Right hip flexion:  3/5  Left knee extension:  3/5  Right knee extension:  3/5  Left foot dorsiflexion:  3/5  Left foot plantar flexion:  3/5  Right foot dorsiflexion:  3/5  Right foot plantar flexion:  3/5    Imaging  MRI THORACIC SPINE WITHOUT CONTRAST(05/15/2017)     INDICATION:  Back pain with pain in the abdominal area  History of multiple prior falls      COMPARISON:  No prior MRI      TECHNIQUE:  Sagittal T1, sagittal T2, sagittal inversion recovery, axial T2,  axial 2D MERGE          IMAGE QUALITY: Diagnostic      FINDINGS:     ALIGNMENT: T7 compression fracture identified with mild retropulsion  Marrow edema is present indicating that this fracture is recent or evolving  There is marrow edema along the inferior endplate of E62 also identified with fracture line visible on   the T1 sequence also compatible with recent compression fracture however no retropulsion is seen  There is approximately 50% loss of height at T7 and with minimal loss of height at T10  Chronic superior endplate compression deformities are seen at   T11-L1 with moderate chronic compression fracture of L2      MARROW SIGNAL:  As above      THORACIC CORD: Normal signal within the thoracic cord      PARAVERTEBRAL SOFT TISSUES: Paraspinal soft tissues are unremarkable      THORACIC DEGENERATIVE CHANGE: T7 compression fracture with mild retropulsion results in moderate central stenosis without cord compression  Mild facet hypertrophy mid and lower thoracic spine without greater than minimal central stenosis      IMPRESSION:     1  Recent or evolving compression fracture at T7 with approximately 50% loss of height and mild retropulsion resulting in moderate central stenosis      2  Recent inferior endplate compression deformity at T10 with marrow edema present  No significant retropulsion      3    Several chronic compression fractures are seen

## 2018-02-07 NOTE — PROGRESS NOTES
I have reviewed the notes, and assessments performed by my Nurse Practitioner and I concur with her documentation of Jessie Ortiz

## 2018-02-26 DIAGNOSIS — F41.9 ANXIETY: Primary | ICD-10-CM

## 2018-02-26 RX ORDER — CLONAZEPAM 0.5 MG/1
TABLET ORAL
Qty: 60 TABLET | Refills: 2 | Status: SHIPPED | OUTPATIENT
Start: 2018-02-26 | End: 2018-05-24 | Stop reason: SDUPTHER

## 2018-03-14 DIAGNOSIS — M54.14 THORACIC RADICULOPATHY: ICD-10-CM

## 2018-03-14 DIAGNOSIS — S22.000G CLOSED WEDGE FRACTURE OF THORACIC VERTEBRA WITH DELAYED HEALING, UNSPECIFIED THORACIC VERTEBRAL LEVEL, SUBSEQUENT ENCOUNTER: ICD-10-CM

## 2018-03-14 DIAGNOSIS — G89.4 CHRONIC PAIN DISORDER: ICD-10-CM

## 2018-03-14 RX ORDER — GABAPENTIN 300 MG/1
CAPSULE ORAL
Qty: 90 CAPSULE | Refills: 2 | Status: SHIPPED | OUTPATIENT
Start: 2018-03-14 | End: 2018-07-04 | Stop reason: SDUPTHER

## 2018-03-22 ENCOUNTER — TELEPHONE (OUTPATIENT)
Dept: FAMILY MEDICINE CLINIC | Facility: CLINIC | Age: 78
End: 2018-03-22

## 2018-03-22 ENCOUNTER — OFFICE VISIT (OUTPATIENT)
Dept: FAMILY MEDICINE CLINIC | Facility: CLINIC | Age: 78
End: 2018-03-22
Payer: MEDICARE

## 2018-03-22 VITALS
SYSTOLIC BLOOD PRESSURE: 108 MMHG | DIASTOLIC BLOOD PRESSURE: 67 MMHG | OXYGEN SATURATION: 88 % | WEIGHT: 95.8 LBS | HEIGHT: 61 IN | BODY MASS INDEX: 18.09 KG/M2 | HEART RATE: 84 BPM

## 2018-03-22 DIAGNOSIS — R25.9 ABNORMAL INVOLUNTARY MOVEMENTS: Primary | ICD-10-CM

## 2018-03-22 DIAGNOSIS — M80.00XS OSTEOPOROSIS WITH CURRENT PATHOLOGICAL FRACTURE, UNSPECIFIED OSTEOPOROSIS TYPE, SEQUELA: ICD-10-CM

## 2018-03-22 DIAGNOSIS — K59.00 CONSTIPATION, UNSPECIFIED CONSTIPATION TYPE: ICD-10-CM

## 2018-03-22 PROCEDURE — 99214 OFFICE O/P EST MOD 30 MIN: CPT | Performed by: FAMILY MEDICINE

## 2018-03-22 RX ORDER — LACTULOSE 20 G/30ML
20 SOLUTION ORAL 2 TIMES DAILY PRN
Qty: 240 ML | Refills: 1 | Status: SHIPPED | OUTPATIENT
Start: 2018-03-22 | End: 2018-05-24 | Stop reason: ALTCHOICE

## 2018-03-22 NOTE — ASSESSMENT & PLAN NOTE
Will get repeat dexa and encourage prolia again  She needs to be on vitamin d3 2000 units daily and get 1200 mg of calcium in her diet daily

## 2018-03-22 NOTE — TELEPHONE ENCOUNTER
The only way I can order prolia is a stat medication to be given tonight  Is that what you want me to do?
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
## 2018-03-22 NOTE — ASSESSMENT & PLAN NOTE
Will refer back to Dr Murphy office for evaluation of movement disorder with negative Huntingtons study

## 2018-04-17 ENCOUNTER — OFFICE VISIT (OUTPATIENT)
Dept: NEUROLOGY | Facility: CLINIC | Age: 78
End: 2018-04-17
Payer: MEDICARE

## 2018-04-17 VITALS
WEIGHT: 92 LBS | HEART RATE: 70 BPM | SYSTOLIC BLOOD PRESSURE: 135 MMHG | BODY MASS INDEX: 17.38 KG/M2 | DIASTOLIC BLOOD PRESSURE: 76 MMHG

## 2018-04-17 DIAGNOSIS — R25.9 ABNORMAL INVOLUNTARY MOVEMENTS: Primary | ICD-10-CM

## 2018-04-17 PROCEDURE — 99214 OFFICE O/P EST MOD 30 MIN: CPT | Performed by: NURSE PRACTITIONER

## 2018-04-17 RX ORDER — DIPHENOXYLATE HYDROCHLORIDE AND ATROPINE SULFATE 2.5; .025 MG/1; MG/1
1 TABLET ORAL DAILY
COMMUNITY
End: 2019-01-22

## 2018-04-17 RX ORDER — GUANFACINE 1 MG/1
0.5 TABLET ORAL
Qty: 30 TABLET | Refills: 5 | Status: SHIPPED | OUTPATIENT
Start: 2018-04-17 | End: 2018-05-24 | Stop reason: SDDI

## 2018-04-17 NOTE — PATIENT INSTRUCTIONS
We will try tenex 1/2 tab (0 5mg) daily at bedtime  If no improvement after 1 week you can increase to 1 full tablet  We can also increase this dose further to twice daily if you notice a benefit  Most common side effects are sleepiness, dizziness, and dry mouth  Call if you experience any bothersome side effects

## 2018-04-17 NOTE — ASSESSMENT & PLAN NOTE
Her HD genetic testing was negative  I do not believe that she was ever able to start the tetrabenazine as it was too expensive  She continues to have chorea-like, involuntary movements  She is frustrated that no one can figure out what is causing this  She continues to function well at home despite the movements  We discussed different treatment options to try and dampen the movements, including tenex, haldol, and a dopamine agonist  They decided to try the tenex, and side effects were discussed  I will have her start a very slow taper, starting at 0 5mg at HS with the ability to increase to 1mg at HS after 1 week  I discussed that if they do see some benefit after a few weeks, we can increase this to BID dosing  They would like to follow-up only on an as needed basis, as the drive is very far for them  I encouraged them to call with any issues or new symptoms  Fall and safety precautions were also reinforced
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
## 2018-04-23 DIAGNOSIS — M54.14 THORACIC RADICULOPATHY: ICD-10-CM

## 2018-04-23 DIAGNOSIS — G89.4 CHRONIC PAIN DISORDER: ICD-10-CM

## 2018-04-23 DIAGNOSIS — S22.000G CLOSED WEDGE FRACTURE OF THORACIC VERTEBRA WITH DELAYED HEALING, UNSPECIFIED THORACIC VERTEBRAL LEVEL, SUBSEQUENT ENCOUNTER: ICD-10-CM

## 2018-04-23 RX ORDER — TRAMADOL HYDROCHLORIDE 50 MG/1
50 TABLET ORAL EVERY 8 HOURS PRN
Qty: 90 TABLET | Refills: 1 | OUTPATIENT
Start: 2018-04-23

## 2018-04-25 DIAGNOSIS — S22.000G CLOSED WEDGE FRACTURE OF THORACIC VERTEBRA WITH DELAYED HEALING, UNSPECIFIED THORACIC VERTEBRAL LEVEL, SUBSEQUENT ENCOUNTER: ICD-10-CM

## 2018-04-25 DIAGNOSIS — G89.4 CHRONIC PAIN DISORDER: ICD-10-CM

## 2018-04-25 DIAGNOSIS — M54.14 THORACIC RADICULOPATHY: ICD-10-CM

## 2018-04-25 DIAGNOSIS — G47.00 INSOMNIA, UNSPECIFIED TYPE: Primary | ICD-10-CM

## 2018-04-25 RX ORDER — TRAZODONE HYDROCHLORIDE 50 MG/1
TABLET ORAL
Qty: 30 TABLET | Refills: 3 | Status: SHIPPED | OUTPATIENT
Start: 2018-04-25 | End: 2018-06-21 | Stop reason: SDUPTHER

## 2018-04-25 NOTE — TELEPHONE ENCOUNTER
Called  to let him know she is good for Prolia  I explained to him he has to let me know when he is coming in so I can order from [facility], [facility] and to discuss with her that she is sure she wants this shot because once ordered we have to pay for the injection   He said he would talk to her to make sure and then call me back to see when they are coming in

## 2018-05-11 DIAGNOSIS — S22.000G CLOSED WEDGE FRACTURE OF THORACIC VERTEBRA WITH DELAYED HEALING, UNSPECIFIED THORACIC VERTEBRAL LEVEL, SUBSEQUENT ENCOUNTER: ICD-10-CM

## 2018-05-11 DIAGNOSIS — G89.4 CHRONIC PAIN DISORDER: ICD-10-CM

## 2018-05-11 DIAGNOSIS — M54.14 THORACIC RADICULOPATHY: ICD-10-CM

## 2018-05-11 RX ORDER — CYCLOBENZAPRINE HCL 10 MG
TABLET ORAL
Qty: 30 TABLET | Refills: 1 | Status: SHIPPED | OUTPATIENT
Start: 2018-05-11 | End: 2018-07-04 | Stop reason: SDUPTHER

## 2018-05-15 ENCOUNTER — HOSPITAL ENCOUNTER (EMERGENCY)
Facility: HOSPITAL | Age: 78
Discharge: HOME | End: 2018-05-15
Attending: EMERGENCY MEDICINE
Payer: MEDICARE

## 2018-05-15 ENCOUNTER — OFFICE VISIT (OUTPATIENT)
Dept: PAIN MEDICINE | Facility: CLINIC | Age: 78
End: 2018-05-15
Payer: MEDICARE

## 2018-05-15 VITALS
HEIGHT: 60 IN | SYSTOLIC BLOOD PRESSURE: 119 MMHG | OXYGEN SATURATION: 95 % | DIASTOLIC BLOOD PRESSURE: 77 MMHG | TEMPERATURE: 98.5 F | WEIGHT: 97.66 LBS | HEART RATE: 97 BPM | RESPIRATION RATE: 18 BRPM | BODY MASS INDEX: 19.17 KG/M2

## 2018-05-15 DIAGNOSIS — R00.0 SINUS TACHYCARDIA: Primary | ICD-10-CM

## 2018-05-15 LAB
ANION GAP SERPL CALCULATED.3IONS-SCNC: 8 MMOL/L (ref 4–13)
BASOPHILS # BLD AUTO: 0.09 THOUSANDS/ΜL (ref 0–0.1)
BASOPHILS NFR BLD AUTO: 1 % (ref 0–1)
BUN SERPL-MCNC: 15 MG/DL (ref 5–25)
CALCIUM SERPL-MCNC: 9.7 MG/DL (ref 8.3–10.1)
CHLORIDE SERPL-SCNC: 99 MMOL/L (ref 100–108)
CO2 SERPL-SCNC: 29 MMOL/L (ref 21–32)
CREAT SERPL-MCNC: 0.83 MG/DL (ref 0.6–1.3)
EOSINOPHIL # BLD AUTO: 0.08 THOUSAND/ΜL (ref 0–0.61)
EOSINOPHIL NFR BLD AUTO: 1 % (ref 0–6)
ERYTHROCYTE [DISTWIDTH] IN BLOOD BY AUTOMATED COUNT: 13.2 % (ref 11.6–15.1)
GFR SERPL CREATININE-BSD FRML MDRD: 68 ML/MIN/1.73SQ M
GLUCOSE SERPL-MCNC: 102 MG/DL (ref 65–140)
HCT VFR BLD AUTO: 41.5 % (ref 34.8–46.1)
HGB BLD-MCNC: 13.7 G/DL (ref 11.5–15.4)
IMM GRANULOCYTES # BLD AUTO: 0.04 THOUSAND/UL (ref 0–0.2)
IMM GRANULOCYTES NFR BLD AUTO: 0 % (ref 0–2)
LYMPHOCYTES # BLD AUTO: 2.39 THOUSANDS/ΜL (ref 0.6–4.47)
LYMPHOCYTES NFR BLD AUTO: 26 % (ref 14–44)
MAGNESIUM SERPL-MCNC: 2.1 MG/DL (ref 1.6–2.6)
MCH RBC QN AUTO: 30.3 PG (ref 26.8–34.3)
MCHC RBC AUTO-ENTMCNC: 33 G/DL (ref 31.4–37.4)
MCV RBC AUTO: 92 FL (ref 82–98)
MONOCYTES # BLD AUTO: 0.83 THOUSAND/ΜL (ref 0.17–1.22)
MONOCYTES NFR BLD AUTO: 9 % (ref 4–12)
NEUTROPHILS # BLD AUTO: 5.69 THOUSANDS/ΜL (ref 1.85–7.62)
NEUTS SEG NFR BLD AUTO: 63 % (ref 43–75)
NRBC BLD AUTO-RTO: 0 /100 WBCS
PLATELET # BLD AUTO: 323 THOUSANDS/UL (ref 149–390)
PMV BLD AUTO: 9.2 FL (ref 8.9–12.7)
POTASSIUM SERPL-SCNC: 3.6 MMOL/L (ref 3.5–5.3)
RBC # BLD AUTO: 4.52 MILLION/UL (ref 3.81–5.12)
SODIUM SERPL-SCNC: 136 MMOL/L (ref 136–145)
TSH SERPL DL<=0.05 MIU/L-ACNC: 0.86 UIU/ML (ref 0.36–3.74)
WBC # BLD AUTO: 9.12 THOUSAND/UL (ref 4.31–10.16)

## 2018-05-15 PROCEDURE — 99285 EMERGENCY DEPT VISIT HI MDM: CPT

## 2018-05-15 PROCEDURE — 36415 COLL VENOUS BLD VENIPUNCTURE: CPT | Performed by: EMERGENCY MEDICINE

## 2018-05-15 PROCEDURE — 83735 ASSAY OF MAGNESIUM: CPT | Performed by: EMERGENCY MEDICINE

## 2018-05-15 PROCEDURE — 80048 BASIC METABOLIC PNL TOTAL CA: CPT | Performed by: EMERGENCY MEDICINE

## 2018-05-15 PROCEDURE — 85025 COMPLETE CBC W/AUTO DIFF WBC: CPT | Performed by: EMERGENCY MEDICINE

## 2018-05-15 PROCEDURE — 93005 ELECTROCARDIOGRAM TRACING: CPT | Performed by: NURSE PRACTITIONER

## 2018-05-15 PROCEDURE — 84443 ASSAY THYROID STIM HORMONE: CPT | Performed by: EMERGENCY MEDICINE

## 2018-05-15 PROCEDURE — 96360 HYDRATION IV INFUSION INIT: CPT

## 2018-05-15 RX ADMIN — SODIUM CHLORIDE 1000 ML: 0.9 INJECTION, SOLUTION INTRAVENOUS at 16:18

## 2018-05-15 NOTE — ED PROVIDER NOTES
History  Chief Complaint   Patient presents with    Hypertension     Patient comes [facility] [place] with tachycardia and hypertension   Rapid Heart Rate     HPI    Prior to Admission Medications   Prescriptions Last Dose Informant Patient Reported? Taking?    Calcium Carb-Cholecalciferol (CALCIUM 600 + D) 600-200 MG-UNIT TABS   Yes No   Sig: Take by mouth   Ibuprofen-Diphenhydramine Cit (ADVIL PM) 200-38 MG TABS   Yes No   Sig: Take 2 tablets by mouth   Multiple Vitamins-Minerals (MULTIVITAMIN ADULT PO)   Yes No   Sig: Take by mouth   Psyllium (METAMUCIL) 28 3 % POWD   Yes No   Sig: Take by mouth   aspirin 81 MG tablet   Yes No   Sig: Take by mouth   bifidobacterium infantis (ALIGN) capsule   Yes No   Sig: Take by mouth   clonazePAM (KlonoPIN) 0 5 mg tablet   No No   Si/2 to 1 po bid prn    cyclobenzaprine (FLEXERIL) 10 mg tablet   No No   Sig: TAKE 1 TABLET BY MOUTH AT BEDTIME   gabapentin (NEURONTIN) 300 mg capsule   No No   Sig: TAKE 1 CAPSULE THREE TIMES A DAY   guanFACINE (TENEX) 1 mg tablet   No No   Sig: Take 0 5 tablets (0 5 mg total) by mouth daily at bedtime May increase to 1 tab at [facility] after 1 week   lactulose 20 g/30 mL   No No   Sig: Take 30 mL (20 g total) by mouth 2 (two) times a day as needed (constipation)   multivitamin (THERAGRAN) TABS  Self Yes No   Sig: Take 1 tablet by mouth daily   traMADol (ULTRAM) 50 mg tablet   No No   Sig: Take 1 tablet (50 mg total) by mouth every 8 (eight) hours as needed for moderate pain   traZODone (DESYREL) 50 mg tablet   No No   Sig: Take 1/2 to 1 tablet by mouth at bedtime as needed      Facility-Administered Medications: None       Past Medical History:   Diagnosis Date    Anxiety     Chronic obstructive lung disease (HCC)     Compression fracture of thoracic vertebra (HCC)     last assessed 17    Depression     Dysthymia     Failure to thrive in adult     Fracture of lumbar vertebra (HCC)     Hypertension     benign essential    Hyponatremia        Past Surgical History:   Procedure Laterality Date    APPENDECTOMY      HYSTERECTOMY         Family History   Problem Relation Age of Onset    Cirrhosis Mother      hepatic    Bone cancer Father      I have reviewed and agree with the history as documented  Social History   Substance Use Topics    Smoking status: Current Every Day Smoker    Smokeless tobacco: Never Used    Alcohol use Yes      Comment: conflicting both occasionally an no use per [employer]        Review of Systems    Physical Exam  ED Triage Vitals   Temperature Pulse Respirations Blood Pressure SpO2   05/15/18 1518 05/15/18 1516 05/15/18 1516 05/15/18 1516 05/15/18 1516   98 5 °F (36 9 °C) (!) 110 20 (!) 188/124 95 %      Temp Source Heart Rate Source Patient Position - Orthostatic VS BP Location FiO2 (%)   05/15/18 1518 -- 05/15/18 1516 05/15/18 1516 --   Oral  Lying Right arm       Pain Score       --                  Orthostatic Vital Signs  Vitals:    05/15/18 1516 05/15/18 1603   BP: (!) 188/124 119/77   Pulse: (!) 110 97   Patient Position - Orthostatic VS: Lying Lying       Physical Exam   Constitutional: She is oriented to person, place, and time  She appears well-developed and well-nourished  No distress  HENT:   Head: Normocephalic and atraumatic  Mouth/Throat: Oropharynx is clear and moist    Eyes: Conjunctivae are normal  Pupils are equal, round, and reactive to light  Neck: Normal range of motion  No tracheal deviation present  No thyromegaly present  Cardiovascular: Regular rhythm, normal heart sounds and intact distal pulses  Tachycardia present  Mild tachycardia, -110   Pulmonary/Chest: Effort normal and breath sounds normal  No respiratory distress  Abdominal: Soft  She exhibits no distension  There is no tenderness  Musculoskeletal: She exhibits no edema  Neurological: She is alert and oriented to person, place, and time  She has normal strength   GCS eye subscore is 4  GCS verbal subscore is 5  GCS motor subscore is 6  Skin: Skin is warm and dry  Psychiatric: She has a normal mood and affect  Her behavior is normal    Nursing note and vitals reviewed  ED Medications  Medications   sodium chloride 0 9 % bolus 1,000 mL (1,000 mL Intravenous New Bag 5/15/18 1618)       Diagnostic Studies  Results Reviewed     Procedure Component Value Units Date/Time    Basic metabolic panel [[id number]]  (Abnormal) Collected:  05/15/18 1608    Lab Status:  Final result Specimen:  Blood from Arm, Right Updated:  05/15/18 1655     Sodium 136 mmol/L      Potassium 3 6 mmol/L      Chloride 99 (L) mmol/L      CO2 29 mmol/L      Anion Gap 8 mmol/L      BUN 15 mg/dL      Creatinine 0 83 mg/dL      Glucose 102 mg/dL      Calcium 9 7 mg/dL      eGFR 68 ml/min/1 73sq m     Narrative:         National Kidney Disease Education Program recommendations are as follows:  GFR calculation is accurate only with a steady state creatinine  Chronic Kidney disease less than 60 ml/min/1 73 sq  meters  Kidney failure less than 15 ml/min/1 73 sq  meters  Magnesium [[id number]]  (Normal) Collected:  05/15/18 1608    Lab Status:  Final result Specimen:  Blood from Arm, Right Updated:  05/15/18 1655     Magnesium 2 1 mg/dL     TSH [[id number]]  (Normal) Collected:  05/15/18 1608    Lab Status:  Final result Specimen:  Blood from Arm, Right Updated:  05/15/18 1655     TSH 3RD GENERATON 0 865 uIU/mL     Narrative:         Patients undergoing fluorescein dye angiography may retain small amounts of fluorescein in the body for 48-72 hours post procedure  Samples containing fluorescein can produce falsely depressed TSH values  If the patient had this procedure,a specimen should be resubmitted post fluorescein clearance            The recommended reference ranges for TSH during pregnancy are as follows:  First trimester 0 1 to 2 5 uIU/mL  Second trimester  0 2 to 3 0 uIU/mL  Third trimester 0 3 to 3 0 uIU/m      CBC and differential [[id number]] Collected:  05/15/18 1608    Lab Status:  Final result Specimen:  Blood from Arm, Right Updated:  05/15/18 1634     WBC 9 12 Thousand/uL      RBC 4 52 Million/uL      Hemoglobin 13 7 g/dL      Hematocrit 41 5 %      MCV 92 fL      MCH 30 3 pg      MCHC 33 0 g/dL      RDW 13 2 %      MPV 9 2 fL      Platelets 714 Thousands/uL      nRBC 0 /100 WBCs      Neutrophils Relative 63 %      Immat GRANS % 0 %      Lymphocytes Relative 26 %      Monocytes Relative 9 %      Eosinophils Relative 1 %      Basophils Relative 1 %      Neutrophils Absolute 5 69 Thousands/µL      Immature Grans Absolute 0 04 Thousand/uL      Lymphocytes Absolute 2 39 Thousands/µL      Monocytes Absolute 0 83 Thousand/µL      Eosinophils Absolute 0 08 Thousand/µL      Basophils Absolute 0 09 Thousands/µL                  No orders to display              Procedures  ECG 12 Lead Documentation  Date/Time: 5/15/2018 3:23 PM  Performed by: Shara Parada  Authorized by: Shara DODSON     Indications / Diagnosis:  Tachycardia  ECG reviewed by me, the ED Provider: yes    Patient location:  ED  Previous ECG:     Previous ECG:  Compared to current    Comparison ECG info:  108    Similarity:  Changes noted  Interpretation:     Interpretation: non-specific    Rate:     ECG rate:  108    ECG rate assessment: tachycardic    Rhythm:     Rhythm: sinus tachycardia    Ectopy:     Ectopy: none    QRS:     QRS axis:  Normal    QRS intervals:  Normal  Conduction:     Conduction: normal    ST segments:     ST segments:  Normal  T waves:     T waves: normal               Phone Contacts  ED Phone Contact    ED Course                               MDM  Number of Diagnoses or Management Options  Sinus tachycardia: new and requires workup  Diagnosis management comments: This is a 57-year-old female who presents here today from the pain management clinic with tachycardia and hypertension    The patient states she had a regularly scheduled visit and while was there was told that both her blood pressure and heart rate were fast   She was told that her heart rate was in the 120s, and her blood pressure was 140s/120s  She endorses a remote history of hypertension, but states that she has been off of blood pressure medications for awhile since her blood pressure has been doing well  She does not routinely check it at home  She states that many years ago she was admitted to the hospital for a fast heart rate, but is uncertain of what caused it or what they told her was wrong  She states she does not carry a long-term diagnosis of any dysrhythmias, is not on any medications for this and has not had follow-up with a cardiologist for it  She denies feeling any palpitations or that her heart rate was going fast at home  She denies any chest pain, trouble breathing, lower extremity edema  She denies any recent nausea, vomiting, diarrhea, URI symptoms  She denies any recent changes in medications, recent over-the-counter medications, recently starting or stopping any medications, or other complaints  She does have issues with chronic pain and does not feel that any of her pain is worse or different from normal  According to the EMS note, her heart rate for them was 140/88, with a heart rate of 120  ROS: Otherwise negative, unless stated as above  She is well-appearing, in no acute distress  Her exam is unremarkable  She was initially hypertensive in triage, will with both elevated systolic and significantly elevated diastolic, but repeat when I was in the room with her was normal at 138/72  Her heart rate here is between 100-110  The significantly elevated diastolic at her appointment was most likely secondary to poor reading given her choreiform movements, especially as it was close to her systolic reading    Her hypertension here is in the setting of both diastolic and systolic, with a relatively low pulse pressure for her systolic, raising suspicion of again abnormal readings because of her choreiform movements, especially as on repeat her blood pressure is normal  More concerning is her tachycardia  Differential for this includes anemia, electrolyte abnormality, thyroid abnormality  She is not having any symptoms suggestive of ACS, underlying PE, significant infection causing this  We will check lab work to evaluate, and monitor her  Her lab work is unremarkable  Her heart rate has stayed in the 90s since my initial evaluation  She continues to be symptom free  I discussed with her monitoring her blood pressure at home, to ensure that it continues to remain normal, as well as monitor her heart rate to see if she has any further episodes of tachycardia  If this is something that is persistent she may need further evaluation of her recurrent tachycardia, especially as she has a remote history of an unspecified dysrhythmia  I discussed with her indications for return, and she expresses understanding with this plan  Amount and/or Complexity of Data Reviewed  Clinical lab tests: reviewed and ordered  Independent visualization of images, tracings, or specimens: yes      CritCare Time    Disposition  Final diagnoses:   Sinus tachycardia     Time reflects when diagnosis was documented in both MDM as applicable and the Disposition within this note     Time User Action Codes Description Comment    5/15/2018  5:28 PM Mahoney-Tesoriero, Launie Schirmer Add [R00 0] Tachycardia     5/15/2018  5:28 PM Mahoney-Tesoriero, Launie Schirmer Remove [R00 0] Tachycardia     5/15/2018  5:28 PM Mahoney-Tesoriero, Launie Schirmer Add [R00 0] Sinus tachycardia       ED Disposition     ED Disposition Condition Comment    Discharge  Kentrell Quincyefrain discharge to home/self care      Condition at discharge: Good        Follow-up Information     Follow up With Specialties Details Why Andres, [address], Nurse Practitioner Schedule an appointment as soon as possible for a visit in 3 days to follow up on your blood pressure and heart rate [address]  [address] [place] [address]  [place] [address]  [phone]          Patient's Medications   Discharge Prescriptions    No medications on file     No discharge procedures on file      ED Provider  Electronically Signed by           Malachi Lyle MD  05/15/18 5100

## 2018-05-15 NOTE — DISCHARGE INSTRUCTIONS
Keep track of your blood pressure and heart rate at home  If they continue to be elevated, you may need to be started on medications to treat this  Follow up with her primary care doctor for further evaluation  Return for worsening or changing symptoms, or for any other concerns  Tachycardia   WHAT YOU NEED TO KNOW:   Tachycardia (fast heart rate) is when your heart rate is 100 beats per minute or more at rest  It is normal for the heart rate to increase with activity or exercise and then decrease when you stop  A fast heart rate at rest may be caused by any of the following:  · Anxiety, stress, or pain    · Fever    · Physical fatigue or strenuous exercise    · Large amounts of caffeine such as coffee, tea, and energy drinks    · Heavy alcohol use or cigarette smoking    · Some medicines, inhalers, or street drugs    · Increased thyroid hormone level  DISCHARGE INSTRUCTIONS:   Call 911 or have someone else call for any of the following:   · You have any of the following signs of a heart attack:     ¨ Squeezing, pressure, or pain in your chest that lasts longer than a few minutes  Chest pain may come and go  ¨ Pain in your jaw, neck, one or both arms, upper and lower back, or stomach  ¨ Shortness of breath, or panting    ¨ Nausea or vomiting    ¨ Lightheadedness    · You cannot be woken  Contact your healthcare provider if:   · Your pulse is faster than your healthcare provider said it should be  · You have frequent periods of a fast heart rate  · You feel weak or dizzy  · You have questions or concerns about your condition or care  Help prevent a fast heart rate:  · Decrease the amount of caffeine you drink  Caffeine can increase your heart rate  · Limit or do not drink alcohol  Alcohol can increase your heart rate  Ask your healthcare provider if it is safe for you to drink alcohol  Also ask how much is safe for you to drink  · Do not smoke    Nicotine and other chemicals in cigarettes can cause damage to your heart  Ask your healthcare provider for information if you currently smoke and need help to quit  E-cigarettes or smokeless tobacco still contain nicotine  Talk to your healthcare provider before you use these products  · Do not use illegal drugs  Drugs such as meth and cocaine can increase your heart rate  Talk to your healthcare provider if you use illegal drugs and want to quit  · Get more rest   Fatigue can cause your heart rate to increase  Ask your healthcare provider about the best exercise plan for you  · Learn ways to cope with stress  Stress, fear, and anxiety can cause a fast heart rate  Your healthcare provider may recommend relaxation techniques and deep breathing exercises  Your healthcare provider may recommend you talk to someone about your stress or anxiety, such as a counselor or a trusted friend  Check your pulse as directed: Your healthcare provider will show you how to check your pulse, and how often to check it  Write down how fast your pulse is and if it feels regular or like it is skipping beats  Also write down the activity you were doing if your heart rate is above 100  Bring the information with you to your follow-up appointment  Follow up with your healthcare provider as directed: You may need more tests  Write down your questions so you remember to ask them at your visit  © 2017 [employer]  Information is for End User's use only and may not be sold, redistributed or otherwise used for commercial purposes  All illustrations and images included in CareNotes® are the copyrighted property of [employer]  or Nicho Moreira  The above information is an  only  It is not intended as medical advice for individual conditions or treatments  Talk to your doctor, nurse or pharmacist before following any medical regimen to see if it is safe and effective for you

## 2018-05-15 NOTE — ED NOTES
Discharge instructions reviewed, patient has no new complaints or concerns at time of discharge  Patient taken out of department in wheelchair by her         Thedora Osgood, RN  05/15/18 1782

## 2018-05-17 ENCOUNTER — TELEPHONE (OUTPATIENT)
Dept: FAMILY MEDICINE CLINIC | Facility: CLINIC | Age: 78
End: 2018-05-17

## 2018-05-17 NOTE — TELEPHONE ENCOUNTER
I called the patient to day to schedule a hospital f/u but she refused she stated that she feels too weak and cannot come in just yet, she stated that she want to the Pain Dr and she was feeling weakness and felt like she was going to faint and they took her BP and stated that it was 48 and the pain dr states that he was scared that she might have been having a heart attack pt stated that she begged not to go to the ER and she was very upset that she was taken but she went and she states that her BP was all over the place  They took her labs and then sent her home  She stated that we will have to weak another week because she is just too weak to come  I spoke with the  and he stated that it is up to her and if she does not want to come in then he will not make her

## 2018-05-18 LAB
ATRIAL RATE: 108 BPM
P AXIS: 70 DEGREES
PR INTERVAL: 138 MS
QRS AXIS: 64 DEGREES
QRSD INTERVAL: 82 MS
QT INTERVAL: 326 MS
QTC INTERVAL: 436 MS
T WAVE AXIS: 67 DEGREES
VENTRICULAR RATE: 108 BPM

## 2018-05-18 PROCEDURE — 93010 ELECTROCARDIOGRAM REPORT: CPT | Performed by: INTERNAL MEDICINE

## 2018-05-24 ENCOUNTER — OFFICE VISIT (OUTPATIENT)
Dept: FAMILY MEDICINE CLINIC | Facility: CLINIC | Age: 78
End: 2018-05-24
Payer: MEDICARE

## 2018-05-24 VITALS
BODY MASS INDEX: 17.77 KG/M2 | DIASTOLIC BLOOD PRESSURE: 68 MMHG | WEIGHT: 91 LBS | HEART RATE: 94 BPM | TEMPERATURE: 96.9 F | SYSTOLIC BLOOD PRESSURE: 112 MMHG | OXYGEN SATURATION: 74 %

## 2018-05-24 DIAGNOSIS — H26.9 CATARACT OF BOTH EYES, UNSPECIFIED CATARACT TYPE: ICD-10-CM

## 2018-05-24 DIAGNOSIS — R25.9 ABNORMAL INVOLUNTARY MOVEMENTS: Primary | ICD-10-CM

## 2018-05-24 DIAGNOSIS — F41.9 ANXIETY: ICD-10-CM

## 2018-05-24 DIAGNOSIS — R53.1 GENERALIZED WEAKNESS: ICD-10-CM

## 2018-05-24 DIAGNOSIS — R10.9 ABDOMINAL PAIN, UNSPECIFIED ABDOMINAL LOCATION: ICD-10-CM

## 2018-05-24 PROCEDURE — 99214 OFFICE O/P EST MOD 30 MIN: CPT | Performed by: FAMILY MEDICINE

## 2018-05-24 RX ORDER — CLONAZEPAM 0.5 MG/1
TABLET ORAL
Qty: 120 TABLET | Refills: 0 | Status: SHIPPED | OUTPATIENT
Start: 2018-05-24 | End: 2018-07-16 | Stop reason: SDUPTHER

## 2018-05-24 NOTE — PROGRESS NOTES
Assessment/Plan:     Chronic Problems:  No problem-specific Assessment & Plan notes found for this encounter  Visit Diagnosis:  Diagnoses and all orders for this visit:    Abnormal involuntary movements  Comments: Will increase the Klonopin to q i d  to see if this helps with the movements  Generalized weakness    Abdominal pain, unspecified abdominal location  Comments: Will repeat CT of abdomen and pelvis   to premedicate the patient prior to the procedure  Orders:  -     CT abdomen pelvis w wo contrast; Future    Cataract of both eyes, unspecified cataract type  Comments:    Still suggest she follow up with an eye doctor but understand it would be difficult with this type of movement at this time  Anxiety  Comments: Will increase the Klonopin to q i d   Orders:  -     clonazePAM (KlonoPIN) 0 5 mg tablet; Take 1 po 6-8 hours prn          Subjective:    Patient ID: Stephani Carr is a 68 y o  female  Pt is here for routine f/u  Feels her tremors and shakes are coming from the cyclobenzaprine  Also got abdominal pains after the cyclobenzaprine  Follows with Dr Sahara Eagle for back pains  Pt is aware that she has had this movement prior to cyclobenzaprine  Seen by Dr Virgie Rodríguez initially who thought she had Parkinsons but had the labs and told was negative  Went back to see Dr Virgie Rodríguez and saw her PA and was given tenex  They never filled the meds  BP's at home range from 120-150's/70's  Has a list with her from home mostly in the 130's/80's  Pt feels more angry than she has felt in her life  Son is a doctor and felt it may be coming from her meds and  cut back her gabapentin to 300 bid from 300 tid  That was over a month ago  No change since meds were reduced  Feels her abdominal pains are worse and the pain is worse in the right and left lower quadrants           The following portions of the patient's history were reviewed and updated as appropriate: allergies, current medications, past family history, past medical history, past social history, past surgical history and problem list     Review of Systems   Constitutional: Positive for fatigue  Negative for chills, diaphoresis and fever  HENT: Negative  Eyes:        Has cataracts and feels she is too weak to go for surgery  Respiratory: Negative for cough, shortness of breath and wheezing  Cardiovascular: Negative for chest pain and palpitations  Gastrointestinal: Positive for abdominal pain (c/o suprapubic pain and sometimes notices swelling and feels like a lump  Small hard bowel movement this am  ), constipation and nausea  Negative for diarrhea and vomiting  Last colonoscopy 2016 by Dr Luzmaria Mann and found to have diverticulosis and diagnosed ibs - c     Endocrine: Positive for cold intolerance  Genitourinary: Positive for frequency and urgency  Negative for dysuria  Counted how many times she urinated and found 15 times in 1 day  Musculoskeletal: Positive for back pain and gait problem  Negative for neck pain  Neurological: Negative for dizziness, light-headedness and headaches  Psychiatric/Behavioral: Positive for dysphoric mood  The patient is nervous/anxious  Fels she is depressed as she is sick over being sick  /68   Pulse 94   Temp (!) 96 9 °F (36 1 °C)   Wt 41 3 kg (91 lb)   SpO2 (!) 74%   BMI 17 77 kg/m²   Social History     Social History    Marital status: /Civil Union     Spouse name: N/A    Number of children: N/A    Years of education: N/A     Occupational History    Not on file       Social History Main Topics    Smoking status: Current Every Day Smoker    Smokeless tobacco: Never Used    Alcohol use Yes      Comment: conflicting both occasionally an no use per [employer]    Drug use: No    Sexual activity: Not on file     Other Topics Concern    Not on file     Social History Narrative    Always uses seatbelt         Past Medical History:   Diagnosis Date    Anxiety     [facility] disease [facility]     Compression fracture of thoracic vertebra ([facility]     last assessed 2/20/17    Depression     Dysthymia     Failure to thrive in adult     Fracture of lumbar vertebra ([facility]     Hypertension     benign essential    Hyponatremia      Family History   Problem Relation Age of Onset    Cirrhosis Mother      hepatic    Bone cancer Father      Past Surgical History:   Procedure Laterality Date    APPENDECTOMY      HYSTERECTOMY         Current Outpatient Prescriptions:     aspirin 81 MG tablet, Take by mouth, Disp: , Rfl:     bifidobacterium infantis (ALIGN) capsule, Take by mouth, Disp: , Rfl:     Calcium Carb-Cholecalciferol (CALCIUM 600 + D) 600-200 MG-UNIT TABS, Take by mouth, Disp: , Rfl:     clonazePAM (KlonoPIN) 0 5 mg tablet, Take 1 po 6-8 hours prn, Disp: 120 tablet, Rfl: 0    cyclobenzaprine (FLEXERIL) 10 mg tablet, TAKE 1 TABLET BY MOUTH AT BEDTIME, Disp: 30 tablet, Rfl: 1    gabapentin (NEURONTIN) 300 mg capsule, TAKE 1 CAPSULE THREE TIMES A DAY, Disp: 90 capsule, Rfl: 2    Ibuprofen-Diphenhydramine Cit (ADVIL PM) 200-38 MG TABS, Take 2 tablets by mouth, Disp: , Rfl:     Multiple Vitamins-Minerals (MULTIVITAMIN ADULT PO), Take by mouth, Disp: , Rfl:     multivitamin (THERAGRAN) TABS, Take 1 tablet by mouth daily, Disp: , Rfl:     Psyllium (METAMUCIL) 28 3 % POWD, Take by mouth, Disp: , Rfl:     traMADol (ULTRAM) 50 mg tablet, Take 1 tablet (50 mg total) by mouth every 8 (eight) hours as needed for moderate pain, Disp: 90 tablet, Rfl: 1    traZODone (DESYREL) 50 mg tablet, Take 1/2 to 1 tablet by mouth at bedtime as needed, Disp: 30 tablet, Rfl: 3    No Known Allergies       Lab Review   Admission on 05/15/2018, Discharged on 05/15/2018   Component Date Value    WBC 05/15/2018 9 12     RBC 05/15/2018 4 52     Hemoglobin 05/15/2018 13 7     Hematocrit 05/15/2018 41 5     MCV 05/15/2018 92     [address] 05/15/2018 30 3     [facility] 05/15/2018 33 0     RDW 05/15/2018 13 2     MPV 05/15/2018 9 2     Platelets 58/99/2238 323     nRBC 05/15/2018 0     Neutrophils Relative 05/15/2018 63     Immat GRANS % 05/15/2018 0     Lymphocytes Relative 05/15/2018 26     Monocytes Relative 05/15/2018 9     Eosinophils Relative 05/15/2018 1     Basophils Relative 05/15/2018 1     Neutrophils Absolute 05/15/2018 5 69     Immature Grans Absolute 05/15/2018 0 04     Lymphocytes Absolute 05/15/2018 2 39     Monocytes Absolute 05/15/2018 0 83     Eosinophils Absolute 05/15/2018 0 08     Basophils Absolute 05/15/2018 0 09     Sodium 05/15/2018 136     Potassium 05/15/2018 3 6     Chloride 05/15/2018 99*    CO2 05/15/2018 29     Anion Gap 05/15/2018 8     BUN 05/15/2018 15     Creatinine 05/15/2018 0 83     Glucose 05/15/2018 102     Calcium 05/15/2018 9 7     eGFR 05/15/2018 68     Magnesium 05/15/2018 2 1     TSH 3RD GENERATON 05/15/2018 0 865    Office Visit on 05/15/2018   Component Date Value    Ventricular Rate 05/15/2018 108     Atrial Rate 05/15/2018 108     IL Interval 05/15/2018 138     QRSD Interval 05/15/2018 82     QT Interval 05/15/2018 326     QTC Interval 05/15/2018 436     P Axis 05/15/2018 70     QRS Axis 05/15/2018 64     T Wave Axis 05/15/2018 67         Imaging: No results found  Objective:     Physical Exam   Constitutional: She is oriented to person, place, and time  She appears well-developed and well-nourished  No distress  HENT:   Head: Normocephalic and atraumatic  Right Ear: External ear normal    Left Ear: External ear normal    Mouth/Throat: Oropharynx is clear and moist    Eyes: Pupils are equal, round, and reactive to light  Right eye exhibits no discharge  Left eye exhibits no discharge  Bilateral cataracts   Neck: Normal range of motion  No tracheal deviation present  Cardiovascular: Normal rate  Difficult to assess heart sounds r/t movements      Pulmonary/Chest: Effort normal  No respiratory distress  Very decreased breath sounds bilaterally  Abdominal: Soft  Very tender right and left lower quadrants with decreased breath sounds bilaterally  Lymphadenopathy:     She has no cervical adenopathy  Neurological: She is alert and oriented to person, place, and time  She exhibits abnormal muscle tone  Coordination abnormal    Pt with gross movements of head, chest and arms  Skin: Skin is warm and dry  No rash noted  She is not diaphoretic  Psychiatric: She has a normal mood and affect  Her behavior is normal  Judgment and thought content normal          Patient Instructions     Discussed all with patient and   They do not wish to see Dr Que Connolly any more as they feel this has not helped and it has been a very long ride  Will attempt to help the movements by increasing the Klonopin to  Every 6-8 hours  Will get CT scan of the abdomen and pelvis but advised to take 2 Klonopin 0 5 hr prior to the procedure and may repeat in 1 hr as needed  I would consider dicyclomine for the abdominal pain but will wait until I get the CT scan of the abdomen and pelvis back    If you would like to try to decrease the gabapentin even further cut it to 1 daily but you will need to let Dr Chetan Chase know about the medication changes      ELMA [place]

## 2018-05-24 NOTE — PATIENT INSTRUCTIONS
Discussed all with patient and   They do not wish to see Dr Samantha Shahid any more as they feel this has not helped and it has been a very long ride  Will attempt to help the movements by increasing the Klonopin to  Every 6-8 hours  Will get CT scan of the abdomen and pelvis but advised to take 2 Klonopin 0 5 hr prior to the procedure and may repeat in 1 hr as needed  I would consider dicyclomine for the abdominal pain but will wait until I get the CT scan of the abdomen and pelvis back    If you would like to try to decrease the gabapentin even further cut it to 1 daily but you will need to let Dr Sary Da Silva know about the medication changes

## 2018-05-25 ENCOUNTER — TELEPHONE (OUTPATIENT)
Dept: RADIOLOGY | Facility: CLINIC | Age: 78
End: 2018-05-25

## 2018-05-25 NOTE — TELEPHONE ENCOUNTER
----- Message from Jacob Portillo sent at 5/25/2018 11:51 AM EDT -----  Please see below: can you please call this patient and ask her how she is feeling and have her give an update on any changes with her health since she was here    ----- Message -----  From: Gianna Lawson MD  Sent: 5/15/2018   2:53 PM  To: ELMA Mg    Patient presented for follow office visit today  In the waiting area, she reported not feeling well  Her vital signs was obtained - BP: 142/117, HR: 122, O2 Sat: 93% RA  I discussed with patient and her  that, she (patient) needs to be evaluated in the ER and that her vital signs are unstable  EMS was subsequently called and patient was evaluated by the EMS team  She was found to have a BP: 140/80, and a HR: 120  Patient states that she does not want to go to the ER  She states that I am her Medical doctor and that I should be able to see her in the office  I informed her that she cannot be seen in the office today  I informed  her  that it is imperative that she goes to the ER and get evaluated due to risk of Myocardial infarction and stroke associated with hypertension and tachycardia  He verbalizes understanding      Patient was taken to the ER via Ambulance at approximately 1452pm

## 2018-05-29 NOTE — TELEPHONE ENCOUNTER
S/w pt  She would like to call [facility] back to reschedule appt  States she was ordered an abdominal CT scan and is having that done 6/6  She will cb after that is addressed

## 2018-05-30 ENCOUNTER — TELEPHONE (OUTPATIENT)
Dept: FAMILY MEDICINE CLINIC | Facility: CLINIC | Age: 78
End: 2018-05-30

## 2018-05-31 NOTE — TELEPHONE ENCOUNTER
Spoke with pt  She is concerned that the barium will make her have diarrhea  Discussed with pt   Encouraged to drink plenty of fluids after the test

## 2018-06-05 ENCOUNTER — TELEPHONE (OUTPATIENT)
Dept: FAMILY MEDICINE CLINIC | Facility: CLINIC | Age: 78
End: 2018-06-05

## 2018-06-05 NOTE — TELEPHONE ENCOUNTER
Sav Romano called asking if you could please revise Suzanne Hunter order for her ct scan she is having tomorrow   They need it to say ct of abd/pelvis w/contrast  Just make a new order in Epic for them

## 2018-06-06 ENCOUNTER — HOSPITAL ENCOUNTER (OUTPATIENT)
Dept: CT IMAGING | Facility: CLINIC | Age: 78
Discharge: HOME | End: 2018-06-06
Payer: MEDICARE

## 2018-06-06 ENCOUNTER — TRANSCRIBE ORDERS (OUTPATIENT)
Dept: RADIOLOGY | Facility: CLINIC | Age: 78
End: 2018-06-06

## 2018-06-06 DIAGNOSIS — R10.9 ABDOMINAL PAIN, UNSPECIFIED ABDOMINAL LOCATION: Primary | ICD-10-CM

## 2018-06-06 DIAGNOSIS — R10.9 ABDOMINAL PAIN, UNSPECIFIED ABDOMINAL LOCATION: ICD-10-CM

## 2018-06-06 PROCEDURE — 74177 CT ABD & PELVIS W/CONTRAST: CPT

## 2018-06-06 RX ADMIN — IOHEXOL 100 ML: 350 INJECTION, SOLUTION INTRAVENOUS at 15:11

## 2018-06-07 ENCOUNTER — TELEPHONE (OUTPATIENT)
Dept: PAIN MEDICINE | Facility: MEDICAL CENTER | Age: 78
End: 2018-06-07

## 2018-06-07 NOTE — TELEPHONE ENCOUNTER
Please call the patient and advise her of Dr Cox Nine decision  If she is ok with continuing on tramadol I will send in a prescription for a week as Dr Brittany Farr suggested  Please schedule appointment with Dr Brittany Farr

## 2018-06-07 NOTE — TELEPHONE ENCOUNTER
She will need to come in for an office visit soon to be evaluated  We have not seen her since she was in the hospital  You may provide her a week supply until she can come in the office  Thank you

## 2018-06-07 NOTE — TELEPHONE ENCOUNTER
Pt's  called stating that his wife is in a tremendous amount of pain and that she is a patient of Nayeli's  He is requesting a call back to see what she can do  Smith Spivey can be reached at [phone]

## 2018-06-07 NOTE — TELEPHONE ENCOUNTER
S/W pt's  Edith Rios as per emergency contact info  He states pt is in severe pain in her back  States she has not been able to schedule an appt since being sent to ER from office at last visit  Taking both gabapentin and Tramadol 3 x per day which is not managing her pain any more  States she had a CT Scan done yesterday and he had to give her double of her Clonazepam to get her there  Is wondering if there is anything to do for her  States she will also need a refill of her Tramadol as she has 9 pills left  Please advise

## 2018-06-08 DIAGNOSIS — S22.000G CLOSED WEDGE FRACTURE OF THORACIC VERTEBRA WITH DELAYED HEALING, UNSPECIFIED THORACIC VERTEBRAL LEVEL, SUBSEQUENT ENCOUNTER: ICD-10-CM

## 2018-06-08 DIAGNOSIS — G89.4 CHRONIC PAIN DISORDER: ICD-10-CM

## 2018-06-08 DIAGNOSIS — M54.14 THORACIC RADICULOPATHY: ICD-10-CM

## 2018-06-08 RX ORDER — TRAMADOL HYDROCHLORIDE 50 MG/1
50 TABLET ORAL EVERY 8 HOURS PRN
Qty: 21 TABLET | Refills: 0 | Status: SHIPPED | OUTPATIENT
Start: 2018-06-08 | End: 2018-06-22

## 2018-06-08 NOTE — TELEPHONE ENCOUNTER
I sent a prescription to the patient's pharmacy for a 7 day supply of tramadol 1 tablet 3 times daily (21 tablets) 7 day supply

## 2018-06-11 ENCOUNTER — TELEPHONE (OUTPATIENT)
Dept: FAMILY MEDICINE CLINIC | Facility: CLINIC | Age: 78
End: 2018-06-11

## 2018-06-11 NOTE — TELEPHONE ENCOUNTER
Spoke with patient  he said he knew the results because he read te report  He said she has an apt with Willi Hickey this week for her severe back pain that she has been in  He wants to wait until after her apt to see what Willi Hickey says and then he will let us know about a surgeon  Also he said go ahead with prolia even though she is afraid of needles really bad   Patient has a f/u with you on the 24th will discuss then and possibly get the prolia that day

## 2018-06-12 DIAGNOSIS — M80.00XG OSTEOPOROSIS WITH CURRENT PATHOLOGICAL FRACTURE WITH DELAYED HEALING, UNSPECIFIED OSTEOPOROSIS TYPE, SUBSEQUENT ENCOUNTER: Primary | ICD-10-CM

## 2018-06-12 NOTE — TELEPHONE ENCOUNTER
No we don't have it  They are expensive shots to have sitting here in the fridge just waiting I order if they agree to have it  Her  said to go ahead and we will see  The problem with that is she is very afraid of shots and gives a hard time per  and she might refuse he said  If that happens and we have to damage the prolia that is our expense

## 2018-06-14 ENCOUNTER — OFFICE VISIT (OUTPATIENT)
Dept: PAIN MEDICINE | Facility: CLINIC | Age: 78
End: 2018-06-14
Payer: MEDICARE

## 2018-06-14 VITALS
HEART RATE: 105 BPM | RESPIRATION RATE: 20 BRPM | DIASTOLIC BLOOD PRESSURE: 96 MMHG | SYSTOLIC BLOOD PRESSURE: 132 MMHG | BODY MASS INDEX: 17.97 KG/M2 | WEIGHT: 92 LBS

## 2018-06-14 DIAGNOSIS — S22.088D OTHER CLOSED FRACTURE OF ELEVENTH THORACIC VERTEBRA WITH ROUTINE HEALING, SUBSEQUENT ENCOUNTER: ICD-10-CM

## 2018-06-14 DIAGNOSIS — G89.4 CHRONIC PAIN SYNDROME: ICD-10-CM

## 2018-06-14 DIAGNOSIS — S32.039D CLOSED FRACTURE OF THIRD LUMBAR VERTEBRA WITH ROUTINE HEALING, UNSPECIFIED FRACTURE MORPHOLOGY, SUBSEQUENT ENCOUNTER: Primary | ICD-10-CM

## 2018-06-14 PROBLEM — S22.089D: Status: ACTIVE | Noted: 2017-06-30

## 2018-06-14 PROCEDURE — 99214 OFFICE O/P EST MOD 30 MIN: CPT | Performed by: ANESTHESIOLOGY

## 2018-06-14 RX ORDER — LIDOCAINE 50 MG/G
1 PATCH TOPICAL DAILY
Qty: 30 PATCH | Refills: 0 | Status: SHIPPED | OUTPATIENT
Start: 2018-06-14 | End: 2019-01-22

## 2018-06-14 RX ORDER — OXYCODONE HYDROCHLORIDE AND ACETAMINOPHEN 5; 325 MG/1; MG/1
1 TABLET ORAL EVERY 8 HOURS PRN
Qty: 90 TABLET | Refills: 0 | Status: SHIPPED | OUTPATIENT
Start: 2018-06-14 | End: 2018-08-06 | Stop reason: SDUPTHER

## 2018-06-14 NOTE — PROGRESS NOTES
Assessment:  1  Closed fracture of third lumbar vertebra with routine healing, unspecified fracture morphology, subsequent encounter  MRI lumbar spine without contrast    Ambulatory referral to Neurosurgery   2  Other closed fracture of eleventh thoracic vertebra with routine healing, subsequent encounter  MRI thoracic spine without contrast    Ambulatory referral to Neurosurgery   3  Chronic pain syndrome  oxyCODONE-acetaminophen (PERCOCET) 5-325 mg per tablet    Ambulatory referral to Neurosurgery      Plan:  The patient is a 22-year-old female with a history of chronic pain syndrome secondary to thoracic radiculopathy, stemming from multiple thoracic and lumbar compression fractures causing radicular symptoms  Patient reports that her pain is not well managed on her current medication regimen for her pain  She continues to experience worsening low back pain not relieved with tramadol 50 mg 1 tablet 3 times daily as needed for pain  On physical examination, patient does have some tenderness to palpation in the lower lumbar region  There is the mid thoracic spine tenderness  Back pain is not severe or tearing in nature  Her blood pressure is stable today, however her heart rate remains high  Today I discussed with patient regarding the importance of adequate blood pressure and heart rate control due to the fact that the patient does have an aneurysm with a diameter of 4 9 cm  Patient's  states that her heart rate is high because she is in pain  I am concerned regarding the potential for rupture if aneurysm expands further  This was communicated with the patient and her  and they both verbalized understanding  They are scheduled to see a  Vascular surgeon  With regards to her pain control I will discontinue tramadol and begin oxycodone-acetaminophen 5/325 mg p o  T i d  I will provide patient with a prescription to be filled today for a 1 month supply    Patient will follow up with me in 4 weeks for reassessment and medication refill  Lidocaine patch 5% ordered  There are risks associated with opioid medications, including dependence, addiction and tolerance  The patient understands and agrees to use these medications only as prescribed  Potential side effects of the medications include, but are not limited to, constipation, drowsiness, addiction, impaired judgment and risk of fatal overdose if not taken as prescribed  The patient was warned against driving while taking sedation medications  Sharing medications is a felony  At this point in time, the patient is showing no signs of addiction, abuse, diversion or suicidal ideation  We will obtain an oral swab today  The specimen will be sent for confirmatory testing  The drug screen is medically necessary because the patient is either dependent on opioid medication or is being considered for opioid medication therapy and the results could impact ongoing or future treatment  The drug screen is to evaluate for the presences or absence of prescribed, non-prescribed, and/or illicit drugs/substances  Patient and her  were advised that the combination of benzodiazepines and opiates increases the risk for respiratory depression  She was caution not to take both concurrently  She verbalizes understanding  [place] Prescription Drug Monitoring Program report was reviewed and was appropriate     I will refer to Dr Sammie Mckinnon for surgical consultation regarding multiple compression fractures  My impressions and treatment recommendations were discussed in detail with the patient who verbalized understanding and had no further questions  Discharge instructions were provided  I personally saw and examined the patient and I agree with the above discussed plan of care  History of Present Illness:  Stefano Tyler is a 68 y o  female who presents for a follow up office visit in regards to Back Pain (radiates to legs)     The patients current symptoms include Medial low back pain  Of note, patient reports worsening pain not relieved with tramadol 50 mg p o  T i d   At her last office visit, she had an episode of hypertension and tachycardia and was taken to the emergency room  A CT scan was performed and it demonstrated history of her prior compression fracture in the thoracic and lumbosacral spine  Today patient reports constant pain not relieved with tramadol  Her pain is dull /achy in nature patient reports relief from her pain medication  Her last prescription was filled on June 8, 2018 for 7 day supply of tramadol 50 mg p o  T i d , 21 tablets  She denies any side effects from tramadol  She states that tramadol was helping until suddenly over the past 2 weeks, her pain got worse  CT scan of her pelvis showed an aneurysm which has expanded to 4 9 cm from 2 5cm (from 2012)  She is yet to schedule an appointment to see the surgeon  I advised her to make the appointment ASAP  She verbalizes understanding  Pain Contract Signed: 02/06/2018   Patient is due for an oral swab    I have personally reviewed and/or updated the patient's past medical history, past surgical history, family history, social history, current medications, allergies, and vital signs today  Review of Systems   Respiratory: Negative for shortness of breath  Cardiovascular: Negative for chest pain  Gastrointestinal: Negative for constipation, diarrhea, nausea and vomiting  Musculoskeletal: Positive for gait problem  Negative for arthralgias, joint swelling and myalgias  Skin: Negative for rash  Neurological: Positive for weakness  Negative for dizziness and seizures  All other systems reviewed and are negative        Patient Active Problem List   Diagnosis    Abdominal pain    Abnormal involuntary movements    Aortic aneurysm (HCC)    Cataract, bilateral    Chronic pain disorder    Closed wedge fracture of thoracic vertebra (HCC)    COPD, mild (HCC)    Generalized weakness    HTN (hypertension)    Hyponatremia    Irritable bowel syndrome without diarrhea    Osteoporosis    Thoracic radiculopathy       Past Medical History:   Diagnosis Date    Abdominal aortic aneurysm (AAA) 3 0 cm to 5 0 cm in diameter in female ([facility] [address] ) 06/08/2018    4 9 cm     Anxiety     Chronic obstructive lung disease (HCC)     Compression fracture of thoracic vertebra (HCC)     last assessed 2/20/17    Depression     Dysthymia     Failure to thrive in adult     [facility]     Hypertension     benign essential    Hyponatremia        Past Surgical History:   Procedure Laterality Date    APPENDECTOMY      HYSTERECTOMY         Family History   Problem Relation Age of Onset    Cirrhosis Mother         hepatic    Bone cancer Father        Social History     Occupational History    Not on file       Social History Main Topics    Smoking status: Current Every Day Smoker    Smokeless tobacco: Never Used    Alcohol use Yes      Comment: conflicting both occasionally an no use per [employer]    Drug use: No    Sexual activity: Not on file       Current Outpatient Prescriptions on File Prior to Visit   Medication Sig    aspirin 81 MG tablet Take by mouth    bifidobacterium infantis (ALIGN) capsule Take by mouth    Calcium Carb-Cholecalciferol (CALCIUM 600 + D) 600-200 MG-UNIT TABS Take by mouth    clonazePAM (KlonoPIN) 0 5 mg tablet Take 1 po 6-8 hours prn    cyclobenzaprine (FLEXERIL) 10 mg tablet TAKE 1 TABLET BY MOUTH AT BEDTIME    gabapentin (NEURONTIN) 300 mg capsule TAKE 1 CAPSULE THREE TIMES A DAY    Ibuprofen-Diphenhydramine Cit (ADVIL PM) 200-38 MG TABS Take 2 tablets by mouth    Multiple Vitamins-Minerals (MULTIVITAMIN ADULT PO) Take by mouth    multivitamin (THERAGRAN) TABS Take 1 tablet by mouth daily    Psyllium (METAMUCIL) 28 3 % POWD Take by mouth    traMADol (ULTRAM) 50 mg tablet Take 1 tablet (50 mg total) by mouth every 8 (eight) hours as needed for moderate pain    traZODone (DESYREL) 50 mg tablet Take 1/2 to 1 tablet by mouth at bedtime as needed     Current Facility-Administered Medications on File Prior to Visit   Medication    denosumab (PROLIA) subcutaneous injection 60 mg       No Known Allergies    Physical Exam:    /96   Pulse 105   Resp 20   Wt 41 7 kg (92 lb)   BMI 17 97 kg/m²     Constitutional:normal, well developed, well nourished, alert, in no distress and non-toxic and no overt pain behavior  Eyes:anicteric  HEENT:grossly intact  Neck:supple, symmetric, trachea midline and no masses   Pulmonary:even and unlabored  Cardiovascular:No edema or pitting edema present  Skin:Normal without rashes or lesions and well hydrated  Psychiatric:Mood and affect appropriate  Neurologic:Cranial Nerves II-XII grossly intact  Musculoskeletal:in wheelchair  Multiple paraspinal muscle tenderness in different areas of the low back  Patient wheel-chair bound  Physical exam limited due to uncontrollable movements      Imaging

## 2018-06-18 ENCOUNTER — TELEPHONE (OUTPATIENT)
Dept: PAIN MEDICINE | Facility: CLINIC | Age: 78
End: 2018-06-18

## 2018-06-18 NOTE — TELEPHONE ENCOUNTER
S/w pt's   Advised to take med as prescribed  He states increasing dose made her lethargic and he is only giving 1 tab tid as ordered but states no relief and tramadol did not help either   Advised will make SI aware

## 2018-06-18 NOTE — TELEPHONE ENCOUNTER
Pt's medication was changed from Tramadol to Percocet 5mg  Percocet has done nothing  Pt's  increased her percocet from 1 pill to 2 pills  would like to speak with someone   can be reached at [phone]

## 2018-06-19 NOTE — TELEPHONE ENCOUNTER
I am not sure what else I can offer her  We recently started the percocet  I need to see her back in the office for re-evaluation

## 2018-06-21 DIAGNOSIS — G47.00 INSOMNIA, UNSPECIFIED TYPE: ICD-10-CM

## 2018-06-21 RX ORDER — TRAZODONE HYDROCHLORIDE 50 MG/1
TABLET ORAL
Qty: 90 TABLET | Refills: 0 | Status: SHIPPED | OUTPATIENT
Start: 2018-06-21 | End: 2018-09-17 | Stop reason: SDUPTHER

## 2018-06-22 ENCOUNTER — OFFICE VISIT (OUTPATIENT)
Dept: PAIN MEDICINE | Facility: CLINIC | Age: 78
End: 2018-06-22
Payer: MEDICARE

## 2018-06-22 VITALS
BODY MASS INDEX: 18.06 KG/M2 | SYSTOLIC BLOOD PRESSURE: 102 MMHG | DIASTOLIC BLOOD PRESSURE: 64 MMHG | HEIGHT: 60 IN | HEART RATE: 92 BPM | WEIGHT: 92 LBS | RESPIRATION RATE: 16 BRPM

## 2018-06-22 DIAGNOSIS — F11.20 UNCOMPLICATED OPIOID DEPENDENCE (HCC): ICD-10-CM

## 2018-06-22 DIAGNOSIS — G89.4 CHRONIC PAIN SYNDROME: Primary | ICD-10-CM

## 2018-06-22 DIAGNOSIS — Z79.891 LONG-TERM CURRENT USE OF OPIATE ANALGESIC: ICD-10-CM

## 2018-06-22 DIAGNOSIS — S32.039D CLOSED FRACTURE OF THIRD LUMBAR VERTEBRA WITH ROUTINE HEALING, UNSPECIFIED FRACTURE MORPHOLOGY, SUBSEQUENT ENCOUNTER: ICD-10-CM

## 2018-06-22 PROCEDURE — 99214 OFFICE O/P EST MOD 30 MIN: CPT | Performed by: ANESTHESIOLOGY

## 2018-06-22 RX ORDER — FENTANYL 25 UG/H
1 PATCH TRANSDERMAL
Qty: 5 PATCH | Refills: 0 | Status: SHIPPED | OUTPATIENT
Start: 2018-06-22 | End: 2018-07-02 | Stop reason: SDUPTHER

## 2018-06-22 NOTE — PROGRESS NOTES
Assessment:  1  Chronic pain syndrome  fentaNYL (DURAGESIC) 25 mcg/hr   2  Closed fracture of third lumbar vertebra with routine healing, unspecified fracture morphology, subsequent encounter     3  Long-term current use of opiate analgesic     4  Uncomplicated opioid dependence ([address] )       Plan:  The patient is a 79-year-old female with a history of chronic pain syndrome secondary to thoracic radiculopathy, stemming from multiple thoracic and lumbar compression fractures causing radicular symptoms   Patient reports that her pain is not well managed on her current medication regimen for her pain  She continues to experience worsening low back pain not relieved with   Oxycodone acetaminophen 5/325 mg -despite taking it two tablets 3 times daily as needed for pain      On physical examination, patient does have some tenderness to palpation in the lower lumbar region  There is the mid thoracic spine tenderness  Patient's pain is not adequately managed  I am concerned regarding patient's taking additional pain medications more than prescribed  At this time, I will add a long-acting regimen  I will begin fentanyl patch 25 mcg Q 72 hours  Advised patient to place the patch in areas of more fatty tissue which will improve   Systemic absorption  Patient verbalized understanding  Patient will be given a prescription for fentanyl patch 25 mcg  Q 72 hours times 15 day supply  I will see her back in 2 weeks for reassessment and medication refill  Patient currently she would have approximately 48 tablets of oxycodone acetaminophen 5/325 mg left over  As such, this is suspected to be sufficient for additional 2 weeks when I see her back in the office for reassessment  I will hold off on a prescription for now  I will reassess in 2 weeks and based on her response to current regimen, I would determine the next course of action  Patient will continue with lidocaine patch 4% as prescribed      MRI of the lumbosacral spine pending  Patient is advised to continue to gabapentin 300 mg p o  T i d      Continue with flexeril 10mg po qhs  I cautioned patient regarding the use of benzodiazepine in addition to opiates - the combination can increase risk for respiratory depression and death  She verbalizes understanding  There are risks associated with opioid medications, including dependence, addiction and tolerance  The patient understands and agrees to use these medications only as prescribed  Potential side effects of the medications include, but are not limited to, constipation, drowsiness, addiction, impaired judgment and risk of fatal overdose if not taken as prescribed  The patient was warned against driving while taking sedation medications  Sharing medications is a felony  At this point in time, the patient is showing no signs of addiction, abuse, diversion or suicidal ideation  [place] Prescription Drug Monitoring Program report was reviewed and was appropriate     My impressions and treatment recommendations were discussed in detail with the patient who verbalized understanding and had no further questions  Discharge instructions were provided  I personally saw and examined the patient and I agree with the above discussed plan of care  History of Present Illness:  Joe Vogt is a 66 y o  female who presents for a follow up office visit in regards to Back Pain  The patients current symptoms include mid-low back pain  She was last seen on 6/14/18 and started on oxycodone-acetaminophen 5/325mg po q8hrs  Of note, patient reports worsening pain not relieved with Oxycodone-acetaminophen 5/325 mg p  O  Q 8 hours  Over the past 6 days, patient reports taking 2 tablets of oxycodone acetaminophen 5/325 mg p  O  2 times a day,#6 tablets a day  Patient has approximately 48 tablets of oxycodone left over at home  Patient reports no relief from current regimen    Patient is here today for reassessment of her medications  She denies side effects from oxycodone  Pain Contract Signed: 02/06/2018   Oral swb performed 6/4/18    I have personally reviewed and/or updated the patient's past medical history, past surgical history, family history, social history, current medications, allergies, and vital signs today  Review of Systems   Respiratory: Negative for shortness of breath  Cardiovascular: Negative for chest pain  Gastrointestinal: Negative for constipation, diarrhea, nausea and vomiting  Musculoskeletal: Positive for gait problem  Negative for arthralgias, joint swelling and myalgias  Skin: Negative for rash  Neurological: Negative for dizziness, seizures and weakness  All other systems reviewed and are negative  Patient Active Problem List   Diagnosis    Abdominal pain    Abnormal involuntary movements    Aortic aneurysm (HCC)    Cataract, bilateral    Chronic pain syndrome    Closed fracture of T11 vertebra with routine healing    COPD, mild (HCC)    Generalized weakness    HTN (hypertension)    Hyponatremia    Irritable bowel syndrome without diarrhea    Osteoporosis    Thoracic radiculopathy    Closed fracture of third lumbar vertebra with routine healing       Past Medical History:   Diagnosis Date    Abdominal aortic aneurysm (AAA) 3 0 cm to 5 0 cm in diameter in female ([address] ) 06/08/2018    4 9 cm     Anxiety     Chronic obstructive lung disease (HCC)     Compression fracture of thoracic vertebra (HCC)     last assessed 2/20/17    Depression     Dysthymia     Failure to thrive in adult     Fracture of lumbar vertebra ([address] )     Hypertension     benign essential    Hyponatremia        Past Surgical History:   Procedure Laterality Date    APPENDECTOMY      HYSTERECTOMY         Family History   Problem Relation Age of Onset    Cirrhosis Mother         hepatic    Bone cancer Father        Social History     Occupational History    Not on file  Social History Main Topics    Smoking status: Current Every Day Smoker    Smokeless tobacco: Never Used    Alcohol use Yes      Comment: conflicting both occasionally an no use per [employer]    Drug use: No    Sexual activity: Not on file       Current Outpatient Prescriptions on File Prior to Visit   Medication Sig    aspirin 81 MG tablet Take by mouth    bifidobacterium infantis (ALIGN) capsule Take by mouth    Calcium Carb-Cholecalciferol (CALCIUM 600 + D) 600-200 MG-UNIT TABS Take by mouth    clonazePAM (KlonoPIN) 0 5 mg tablet Take 1 po 6-8 hours prn    cyclobenzaprine (FLEXERIL) 10 mg tablet TAKE 1 TABLET BY MOUTH AT BEDTIME    gabapentin (NEURONTIN) 300 mg capsule TAKE 1 CAPSULE THREE TIMES A DAY    Ibuprofen-Diphenhydramine Cit (ADVIL PM) 200-38 MG TABS Take 2 tablets by mouth    lidocaine (LIDODERM) 5 % Place 1 patch on the skin daily Remove & Discard patch within 12 hours or as directed by MD    Multiple Vitamins-Minerals (MULTIVITAMIN ADULT PO) Take by mouth    multivitamin (THERAGRAN) TABS Take 1 tablet by mouth daily    oxyCODONE-acetaminophen (PERCOCET) 5-325 mg per tablet Take 1 tablet by mouth every 8 (eight) hours as needed for moderate pain Max Daily Amount: 3 tablets    Psyllium (METAMUCIL) 28 3 % POWD Take by mouth    traZODone (DESYREL) 50 mg tablet Take 1/2 to 1 tablet by mouth at bedtime as needed    [DISCONTINUED] traMADol (ULTRAM) 50 mg tablet Take 1 tablet (50 mg total) by mouth every 8 (eight) hours as needed for moderate pain     Current Facility-Administered Medications on File Prior to Visit   Medication    denosumab (PROLIA) subcutaneous injection 60 mg       No Known Allergies    Physical Exam:    /64   Pulse 92   Resp 16   Ht 5' (1 524 m)   Wt 41 7 kg (92 lb)   BMI 17 97 kg/m²     Constitutional:normal, well developed, well nourished, alert, in no distress and non-toxic and no overt pain behavior    Eyes:anicteric  HEENT:grossly intact  Neck:supple, symmetric, trachea midline and no masses   Pulmonary:even and unlabored  Cardiovascular:No edema or pitting edema present  Skin:Normal without rashes or lesions and well hydrated  Psychiatric:Mood and affect appropriate  Neurologic:Cranial Nerves II-XII grossly intact  Musculoskeletal:normal and Multiple paraspinal muscle tenderness in different areas of the low back  Patient wheel-chair bound  Physical exam limited due to uncontrollable movements      Imaging

## 2018-06-26 ENCOUNTER — TELEPHONE (OUTPATIENT)
Dept: FAMILY MEDICINE CLINIC | Facility: CLINIC | Age: 78
End: 2018-06-26

## 2018-06-26 ENCOUNTER — TELEPHONE (OUTPATIENT)
Dept: PAIN MEDICINE | Facility: MEDICAL CENTER | Age: 78
End: 2018-06-26

## 2018-06-26 NOTE — TELEPHONE ENCOUNTER
She can take an additional tablet of percocet during the day   Move up OV for follow up as she will be running out earlier than expected

## 2018-06-26 NOTE — TELEPHONE ENCOUNTER
S/w pt's  and advised the same  With increase, pt has 7 day supply  Per SI, OK to double book anytime on Monday July2nd, due to limited availability next week  Pt's  states they can be here at 56PM  S/w clerical and gave OK to double book

## 2018-06-26 NOTE — TELEPHONE ENCOUNTER
Patient  called to let me know that she has been in a lot of pain and he did call Jzamín English to inform him  He's said Jazmín English put her on fentanyl patch and it made her pain worse per   I cancelled her apt that she had with you today because it was too hard for him to get her up and moving  Tati Alcantara had a call out to Jazmín Englsih office waiting for a call back   She finally got a call back and Jazmín English said to take one more percocet for today and see if it helps

## 2018-06-26 NOTE — TELEPHONE ENCOUNTER
S/w pt's   Seen in office 6/22  States 25mcg fentanyl patch is "not even taking the edge off " Pt is also taking percocet as ordered     Please advise

## 2018-06-26 NOTE — TELEPHONE ENCOUNTER
Voicemail from patient 06/25/18 12:14pm: patient requesting call back from nurse, please call patient at [phone]

## 2018-06-28 ENCOUNTER — HOSPITAL ENCOUNTER (OUTPATIENT)
Dept: MRI IMAGING | Facility: CLINIC | Age: 78
Discharge: HOME | End: 2018-06-28
Payer: MEDICARE

## 2018-06-28 DIAGNOSIS — S32.039D CLOSED FRACTURE OF THIRD LUMBAR VERTEBRA WITH ROUTINE HEALING, UNSPECIFIED FRACTURE MORPHOLOGY, SUBSEQUENT ENCOUNTER: ICD-10-CM

## 2018-06-28 DIAGNOSIS — S22.088D OTHER CLOSED FRACTURE OF ELEVENTH THORACIC VERTEBRA WITH ROUTINE HEALING, SUBSEQUENT ENCOUNTER: ICD-10-CM

## 2018-06-28 PROCEDURE — 72146 MRI CHEST SPINE W/O DYE: CPT

## 2018-06-28 PROCEDURE — 72148 MRI LUMBAR SPINE W/O DYE: CPT

## 2018-06-29 ENCOUNTER — TELEPHONE (OUTPATIENT)
Dept: PAIN MEDICINE | Facility: MEDICAL CENTER | Age: 78
End: 2018-06-29

## 2018-07-02 ENCOUNTER — OFFICE VISIT (OUTPATIENT)
Dept: PAIN MEDICINE | Facility: CLINIC | Age: 78
End: 2018-07-02
Payer: MEDICARE

## 2018-07-02 VITALS
HEIGHT: 60 IN | BODY MASS INDEX: 18.06 KG/M2 | WEIGHT: 92 LBS | DIASTOLIC BLOOD PRESSURE: 88 MMHG | RESPIRATION RATE: 16 BRPM | SYSTOLIC BLOOD PRESSURE: 130 MMHG | HEART RATE: 55 BPM

## 2018-07-02 DIAGNOSIS — Z79.891 LONG-TERM CURRENT USE OF OPIATE ANALGESIC: ICD-10-CM

## 2018-07-02 DIAGNOSIS — F11.20 UNCOMPLICATED OPIOID DEPENDENCE (HCC): Primary | ICD-10-CM

## 2018-07-02 DIAGNOSIS — G89.4 CHRONIC PAIN SYNDROME: ICD-10-CM

## 2018-07-02 PROCEDURE — 99214 OFFICE O/P EST MOD 30 MIN: CPT | Performed by: ANESTHESIOLOGY

## 2018-07-02 RX ORDER — FENTANYL 25 UG/H
PATCH TRANSDERMAL
Qty: 10 PATCH | Refills: 0 | Status: SHIPPED | OUTPATIENT
Start: 2018-07-06 | End: 2018-08-06 | Stop reason: SDUPTHER

## 2018-07-02 RX ORDER — OXYCODONE AND ACETAMINOPHEN 7.5; 325 MG/1; MG/1
1 TABLET ORAL EVERY 8 HOURS PRN
Qty: 90 TABLET | Refills: 0 | Status: SHIPPED | OUTPATIENT
Start: 2018-07-02 | End: 2018-07-31 | Stop reason: SDUPTHER

## 2018-07-02 NOTE — PROGRESS NOTES
Assessment:  1  Uncomplicated opioid dependence ([facility] [address] )     2  Chronic pain syndrome  fentaNYL (DURAGESIC) 25 mcg/hr    oxyCODONE-acetaminophen (PERCOCET) 7 5-325 MG per tablet   3  Long-term current use of opiate analgesic           Plan:  The patient is a 77-year-old female with a history of chronic pain syndrome secondary to thoracic radiculopathy, stemming from multiple thoracic and lumbar compression fractures causing radicular symptoms   Patient was recently placed on fentanyl patch 25 mcg Q 72 hours  Patient reports that her pain is not well managed  She continues to experience worsening low back pain not relieved with  Fentanyl patch 25 mcg and Oxycodone acetaminophen 5/325 mg q8hrs  In addition, patient takes gabapentin 300 mg p o  T i d  And Flexeril 10 mg p o  T i d  To help with neuropathic pain and muscle spasms respectively  Today, I had a long discussion with patient and  regarding the appropriate use of fentanyl patch  I advised that the patch needs to be secured with the tape to avoid it falling off  If it does fall off patient will not be getting the desired dosage of medication  I informed patient and her  that I will continue the same dosage at 25 mcg Q 72 hours  She will be given a prescription to be filled on July 6, 2018  In addition, considering patient has more frequent breakthrough episodes of pain, I will increase the dose of oxycodone-acetaminophen to 7 5/325 mg p o  q 8 hours p r n  pain  The prescription will be dated to be filled today July 2nd 2018  I will see her back in 4 weeks for reassessment and medication refill  Patient and her  verbalizes understanding  Patient has sufficient supply of gabapentin and Flexeril left over  If she is running low she is advised to give us a call for refill      I instructed patient and her  that patient needs to bring her prescription in the office as a random pill counts is indicated when necessary for future refills  Patient and her  verbalizes understanding  There are risks associated with opioid medications, including dependence, addiction and tolerance  The patient understands and agrees to use these medications only as prescribed  Potential side effects of the medications include, but are not limited to, constipation, drowsiness, addiction, impaired judgment and risk of fatal overdose if not taken as prescribed  The patient was warned against driving while taking sedation medications  Sharing medications is a felony  At this point in time, the patient is showing no signs of addiction, abuse, diversion or suicidal ideation  [facility] Prescription Drug Monitoring Program report was reviewed and was appropriate     Follow-up in 4 weeks or sooner if worsening pain  My impressions and treatment recommendations were discussed in detail with the patient who verbalized understanding and had no further questions  Discharge instructions were provided  I personally saw and examined the patient and I agree with the above discussed plan of care  History of Present Illness:  Joe Vogt is a 66 y o  female who presents for a follow up office visit in regards to Back Pain  The patients current symptoms include mid-low back pain  She was last seen on 6/22/18 and started on Fentanyl patch 25mcg and oxycodone-acetaminophen 5/325mg po q8hrs  Her  called last week as darrineintr was in a lot of pain and I advised that she take an additional tablet of oxycodone-acetaminophen  Of note, patient reports worsening pain not relieved with Oxycodone-acetaminophen 5/325 mg p  O  Q 8 hours and Fentanyl 25mcg q72hrs  Patient's  states that his wife has 2 tablets of oxycodone left over and 1 patch at home  Patient is here today for reassessment of her medications  She denies side effects from oxycodone  He states that he puts a band aid over the patch so it stays on   Pain is rated 10/10 as per patient and her        Current pain medications includes:  fentanyl patch 25mcg q72hrs and oxycodone-acetaminophen 5/325mg po q8hrs   The patient reports that this regimen is providing 25% pain relief  The patient is reporting no side effects from this pain medication regimen  Patient is also on Flexeril 10 mg p o  T i d  And gabapentin 300 mg p o  T i d  Patient has sufficient supplies of these  Pain Contract Signed: 02/06/2018   Oral swb performed 6/4/18 - consistent    MRI of the lumbar and the thoracic spine demonstrates acute on chronic multiple compression fractures  Patient is yet to make an appointment to see Dr Krista Goodson  I have personally reviewed and/or updated the patient's past medical history, past surgical history, family history, social history, current medications, allergies, and vital signs today  Review of Systems   Respiratory: Negative for shortness of breath  Cardiovascular: Negative for chest pain  Gastrointestinal: Negative for constipation, diarrhea, nausea and vomiting  Musculoskeletal: Positive for gait problem  Negative for arthralgias, joint swelling and myalgias  Skin: Negative for rash  Neurological: Negative for dizziness, seizures and weakness  All other systems reviewed and are negative        Patient Active Problem List   Diagnosis    Abdominal pain    Abnormal involuntary movements    Aortic aneurysm (HCC)    Cataract, bilateral    Chronic pain syndrome    Closed fracture of T11 vertebra with routine healing    COPD, mild (HCC)    Generalized weakness    HTN (hypertension)    Hyponatremia    Irritable bowel syndrome without diarrhea    Osteoporosis    Thoracic radiculopathy    Closed fracture of third lumbar vertebra with routine healing    Long-term current use of opiate analgesic    Uncomplicated opioid dependence ([facility] [address] )       Past Medical History:   Diagnosis Date    Abdominal aortic aneurysm (AAA) 3 0 cm to 5 0 cm in diameter in female ([facility] [address] ) 06/08/2018    4 9 cm     Anxiety     Chronic obstructive lung disease (HCC)     Compression fracture of thoracic vertebra (HCC)     last assessed 2/20/17    Depression     Dysthymia     Failure to thrive in adult     Fracture of lumbar vertebra (HCC)     Hypertension     benign essential    Hyponatremia        Past Surgical History:   Procedure Laterality Date    APPENDECTOMY      HYSTERECTOMY         Family History   Problem Relation Age of Onset    Cirrhosis Mother         hepatic    Bone cancer Father        Social History     Occupational History    Not on file       Social History Main Topics    Smoking status: Current Every Day Smoker    Smokeless tobacco: Never Used    Alcohol use Yes      Comment: conflicting both occasionally an no use per [employer]    Drug use: No    Sexual activity: Not on file       Current Outpatient Prescriptions on File Prior to Visit   Medication Sig    aspirin 81 MG tablet Take by mouth    bifidobacterium infantis (ALIGN) capsule Take by mouth    Calcium Carb-Cholecalciferol (CALCIUM 600 + D) 600-200 MG-UNIT TABS Take by mouth    clonazePAM (KlonoPIN) 0 5 mg tablet Take 1 po 6-8 hours prn    cyclobenzaprine (FLEXERIL) 10 mg tablet TAKE 1 TABLET BY MOUTH AT BEDTIME    fentaNYL (DURAGESIC) 25 mcg/hr Place 1 patch on the skin every third day for 15 days Max Daily Amount: 1 patch    gabapentin (NEURONTIN) 300 mg capsule TAKE 1 CAPSULE THREE TIMES A DAY    Ibuprofen-Diphenhydramine Cit (ADVIL PM) 200-38 MG TABS Take 2 tablets by mouth    lidocaine (LIDODERM) 5 % Place 1 patch on the skin daily Remove & Discard patch within 12 hours or as directed by MD    Multiple Vitamins-Minerals (MULTIVITAMIN ADULT PO) Take by mouth    multivitamin (THERAGRAN) TABS Take 1 tablet by mouth daily    oxyCODONE-acetaminophen (PERCOCET) 5-325 mg per tablet Take 1 tablet by mouth every 8 (eight) hours as needed for moderate pain Max Daily Amount: 3 tablets    Psyllium (METAMUCIL) 28 3 % POWD Take by mouth    traZODone (DESYREL) 50 mg tablet Take 1/2 to 1 tablet by mouth at bedtime as needed     Current Facility-Administered Medications on File Prior to Visit   Medication    denosumab (PROLIA) subcutaneous injection 60 mg       No Known Allergies    Physical Exam:    /88   Pulse 55   Resp 16   Ht 5' (1 524 m)   Wt 41 7 kg (92 lb)   BMI 17 97 kg/m²     Constitutional:normal, well developed, well nourished, alert, in no distress and non-toxic and no overt pain behavior    Eyes:anicteric  HEENT:grossly intact  Neck:supple, symmetric, trachea midline and no masses   Pulmonary:even and unlabored  Cardiovascular:No edema or pitting edema present  Skin:Normal without rashes or lesions and well hydrated  Psychiatric:Mood and affect appropriate  Neurologic:Cranial Nerves II-XII grossly intact  Musculoskeletal: wheelchair bound    Imaging

## 2018-07-04 DIAGNOSIS — G89.4 CHRONIC PAIN DISORDER: ICD-10-CM

## 2018-07-04 DIAGNOSIS — M54.14 THORACIC RADICULOPATHY: ICD-10-CM

## 2018-07-04 DIAGNOSIS — S22.000G CLOSED WEDGE FRACTURE OF THORACIC VERTEBRA WITH DELAYED HEALING, UNSPECIFIED THORACIC VERTEBRAL LEVEL, SUBSEQUENT ENCOUNTER: ICD-10-CM

## 2018-07-04 RX ORDER — CYCLOBENZAPRINE HCL 10 MG
TABLET ORAL
Qty: 30 TABLET | Refills: 1 | Status: SHIPPED | OUTPATIENT
Start: 2018-07-04 | End: 2018-09-17 | Stop reason: SDUPTHER

## 2018-07-05 RX ORDER — GABAPENTIN 300 MG/1
CAPSULE ORAL
Qty: 90 CAPSULE | Refills: 2 | Status: SHIPPED | OUTPATIENT
Start: 2018-07-05 | End: 2018-09-25 | Stop reason: SDUPTHER

## 2018-07-10 ENCOUNTER — OFFICE VISIT (OUTPATIENT)
Dept: FAMILY MEDICINE CLINIC | Facility: CLINIC | Age: 78
End: 2018-07-10
Payer: MEDICARE

## 2018-07-10 VITALS
BODY MASS INDEX: 17.88 KG/M2 | DIASTOLIC BLOOD PRESSURE: 88 MMHG | OXYGEN SATURATION: 92 % | RESPIRATION RATE: 18 BRPM | SYSTOLIC BLOOD PRESSURE: 130 MMHG | WEIGHT: 91.04 LBS | HEART RATE: 113 BPM | TEMPERATURE: 99.1 F | HEIGHT: 60 IN

## 2018-07-10 DIAGNOSIS — M48.56XG NON-TRAUMATIC COMPRESSION FRACTURE OF L4 LUMBAR VERTEBRA WITH DELAYED HEALING, SUBSEQUENT ENCOUNTER: ICD-10-CM

## 2018-07-10 DIAGNOSIS — I71.4 ABDOMINAL AORTIC ANEURYSM (AAA) WITHOUT RUPTURE (HCC): Primary | ICD-10-CM

## 2018-07-10 DIAGNOSIS — M80.00XG OSTEOPOROSIS WITH CURRENT PATHOLOGICAL FRACTURE WITH DELAYED HEALING, UNSPECIFIED OSTEOPOROSIS TYPE, SUBSEQUENT ENCOUNTER: ICD-10-CM

## 2018-07-10 DIAGNOSIS — R25.9 ABNORMAL INVOLUNTARY MOVEMENTS: ICD-10-CM

## 2018-07-10 PROCEDURE — 99214 OFFICE O/P EST MOD 30 MIN: CPT | Performed by: FAMILY MEDICINE

## 2018-07-10 NOTE — PROGRESS NOTES
Assessment/Plan:     Chronic Problems: Aortic aneurysm ([facility] [address] )  Nearly doubled in size since 2012  Will refer for evaluation  Visit Diagnosis:  Diagnoses and all orders for this visit:    Abdominal aortic aneurysm (AAA) without rupture [facility])  -     Ambulatory referral to Thoracic Surgery; Future    Abnormal involuntary movements  Comments:  Movements are actually much less than prior  Family does not want any further work up for this  Non-traumatic compression fracture of L4 lumbar vertebra with delayed healing, subsequent encounter  Comments:  Pt and  would both prefer to see Dr Mark Moreno  Orders:  -     Ambulatory referral to Orthopedic Surgery; Future    Osteoporosis with current pathological fracture with delayed healing, unspecified osteoporosis type, subsequent encounter          Subjective:    Patient ID: Marissa Red is a 66 y o  female  Pt is here for routine f/u  Having bad back pains and seen by pain management and told she has another compression fracture  Dr Dorita Almeida wants her to see a surgeon in [place]  Still in severe pain  Pt has osteoporosis and has not been compliant with prolia  Now on oxy 7 5 tid and fentanyl patch every 3 days 25 ucgs  Not helping the pain  Pt still has a movement disorder but seems to be much less  They do not want her to follow with anyone for this  Takes all other meds as directed  No side effects noted  The following portions of the patient's history were reviewed and updated as appropriate: allergies, current medications, past family history, past medical history, past social history, past surgical history and problem list     Review of Systems   Constitutional: Negative for chills, diaphoresis, fatigue and fever  HENT: Negative  Eyes: Negative  Respiratory: Negative for cough, shortness of breath and wheezing  Cardiovascular: Negative for chest pain and palpitations     Gastrointestinal: Positive for abdominal pain (persistent but better  Negative w/u in the past ) and constipation (but not as bad as before  On miralax every 2 - 3 days  )  Endocrine: Positive for cold intolerance  Negative for heat intolerance  Genitourinary: Negative  Musculoskeletal: Positive for back pain and gait problem  Walks at home with a walker  Does not use the stairs  Neurological: Negative for dizziness, light-headedness and headaches  Psychiatric/Behavioral: Negative for dysphoric mood  The patient is not nervous/anxious  /88   Pulse (!) 113   Temp 99 1 °F (37 3 °C)   Resp 18   Ht 5' (1 524 m)   Wt 41 3 kg (91 lb 0 6 oz)   SpO2 92%   BMI 17 78 kg/m²   Social History     Social History    Marital status: /Civil Union     Spouse name: N/A    Number of children: N/A    Years of education: N/A     Occupational History    Not on file       Social History Main Topics    Smoking status: Current Every Day Smoker    Smokeless tobacco: Never Used    Alcohol use Yes      Comment: conflicting both occasionally an no use per [employer]    Drug use: No    Sexual activity: Not on file     Other Topics Concern    Not on file     Social History Narrative    Always uses seatbelt         Past Medical History:   Diagnosis Date    Abdominal aortic aneurysm (AAA) 3 0 cm to 5 0 cm in diameter in female ([facility] [address] ) 06/08/2018    4 9 cm     Anxiety     Chronic obstructive lung disease (HCC)     Compression fracture of thoracic vertebra (HCC)     last assessed 2/20/17    Depression     Dysthymia     Failure to thrive in adult     Fracture of lumbar vertebra ([facility] [address] )     Hypertension     benign essential    Hyponatremia      Family History   Problem Relation Age of Onset    Cirrhosis Mother         hepatic    Bone cancer Father      Past Surgical History:   Procedure Laterality Date    APPENDECTOMY      HYSTERECTOMY         Current Outpatient Prescriptions:     aspirin 81 MG tablet, Take by mouth, Disp: , Rfl:    bifidobacterium infantis (ALIGN) capsule, Take by mouth, Disp: , Rfl:     Calcium Carb-Cholecalciferol (CALCIUM 600 + D) 600-200 MG-UNIT TABS, Take by mouth, Disp: , Rfl:     clonazePAM (KlonoPIN) 0 5 mg tablet, Take 1 po 6-8 hours prn, Disp: 120 tablet, Rfl: 0    cyclobenzaprine (FLEXERIL) 10 mg tablet, TAKE 1 TABLET BY MOUTH AT BEDTIME, Disp: 30 tablet, Rfl: 1    fentaNYL (DURAGESIC) 25 mcg/hr, Earliest Fill Date: 7/6/18 Place 1 patch q72hrs, Disp: 10 patch, Rfl: 0    gabapentin (NEURONTIN) 300 mg capsule, TAKE 1 CAPSULE THREE TIMES A DAY, Disp: 90 capsule, Rfl: 2    Ibuprofen-Diphenhydramine Cit (ADVIL PM) 200-38 MG TABS, Take 2 tablets by mouth, Disp: , Rfl:     lidocaine (LIDODERM) 5 %, Place 1 patch on the skin daily Remove & Discard patch within 12 hours or as directed by MD, Disp: 30 patch, Rfl: 0    Multiple Vitamins-Minerals (MULTIVITAMIN ADULT PO), Take by mouth, Disp: , Rfl:     multivitamin (THERAGRAN) TABS, Take 1 tablet by mouth daily, Disp: , Rfl:     oxyCODONE-acetaminophen (PERCOCET) 5-325 mg per tablet, Take 1 tablet by mouth every 8 (eight) hours as needed for moderate pain Max Daily Amount: 3 tablets, Disp: 90 tablet, Rfl: 0    oxyCODONE-acetaminophen (PERCOCET) 7 5-325 MG per tablet, Take 1 tablet by mouth every 8 (eight) hours as needed for moderate pain for up to 30 days Max Daily Amount: 3 tablets, Disp: 90 tablet, Rfl: 0    Psyllium (METAMUCIL) 28 3 % POWD, Take by mouth, Disp: , Rfl:     traZODone (DESYREL) 50 mg tablet, Take 1/2 to 1 tablet by mouth at bedtime as needed, Disp: 90 tablet, Rfl: 0    Current Facility-Administered Medications:     denosumab (PROLIA) subcutaneous injection 60 mg, 60 mg, Subcutaneous, Once, ELMA [place]    No Known Allergies       Lab Review   Admission on 05/15/2018, Discharged on 05/15/2018   Component Date Value    WBC 05/15/2018 9 12     RBC 05/15/2018 4 52     Hemoglobin 05/15/2018 13 7     Hematocrit 05/15/2018 41 5     MCV 05/15/2018 92     MCH 05/15/2018 30 3     MCHC 05/15/2018 33 0     RDW 05/15/2018 13 2     MPV 05/15/2018 9 2     Platelets 57/42/9658 323     nRBC 05/15/2018 0     Neutrophils Relative 05/15/2018 63     Immat GRANS % 05/15/2018 0     Lymphocytes Relative 05/15/2018 26     Monocytes Relative 05/15/2018 9     Eosinophils Relative 05/15/2018 1     Basophils Relative 05/15/2018 1     Neutrophils Absolute 05/15/2018 5 69     Immature Grans Absolute 05/15/2018 0 04     Lymphocytes Absolute 05/15/2018 2 39     Monocytes Absolute 05/15/2018 0 83     Eosinophils Absolute 05/15/2018 0 08     Basophils Absolute 05/15/2018 0 09     Sodium 05/15/2018 136     Potassium 05/15/2018 3 6     Chloride 05/15/2018 99*    CO2 05/15/2018 29     Anion Gap 05/15/2018 8     BUN 05/15/2018 15     Creatinine 05/15/2018 0 83     Glucose 05/15/2018 102     Calcium 05/15/2018 9 7     eGFR 05/15/2018 68     Magnesium 05/15/2018 2 1     TSH 3RD GENERATON 05/15/2018 0 865    Office Visit on 05/15/2018   Component Date Value    Ventricular Rate 05/15/2018 108     Atrial Rate 05/15/2018 108     WA Interval 05/15/2018 138     QRSD Interval 05/15/2018 82     QT Interval 05/15/2018 326     QTC Interval 05/15/2018 436     P Axis 05/15/2018 70     QRS Axis 05/15/2018 64     T Wave Axis 05/15/2018 67         Imaging: Mri Thoracic Spine Without Contrast    Result Date: 6/29/2018  Narrative: MRI THORACIC SPINE WITHOUT CONTRAST INDICATION: 79-year-old female, back pain, compression fractures COMPARISON:  5/15/2017 MRI TECHNIQUE:  Sagittal T1, sagittal T2, sagittal inversion recovery, axial T2,  axial 2D MERGE  IMAGE QUALITY: Diagnostic  FINDINGS: ALIGNMENT: Moderate to severe chronic compression fracture T7, unchanged in severity  Mild chronic inferior endplate compression deformity T10, unchanged in severity  Mild superior endplate compression deformity at T11 and T12, unchanged in severity   MARROW SIGNAL:  No marrow edema identified to suggest recent exacerbation of the above noted fractures  No space-occupying marrow pathology  THORACIC CORD: Normal signal within the thoracic cord  PARAVERTEBRAL SOFT TISSUES: Persistent bilateral renal cysts THORACIC DEGENERATIVE CHANGE: Mild retropulsion of the superior endplate at T7 producing moderate central canal stenosis without spinal cord compression  Mild multilevel degenerative spondylosis is present throughout the mid and lower thoracic segments,  unchanged  Impression: Stable chronic compression deformities at T7, T10, T11 and T12 No evidence of acute compression fracture Moderate central canal stenosis at T7 Mild multilevel degenerative spondylosis Persistent renal cysts Workstation performed: [id number]     Mri Lumbar Spine Without Contrast    Result Date: 6/29/2018  Narrative: MRI LUMBAR SPINE WITHOUT CONTRAST INDICATION: [id number]: Unspecified fracture of third lumbar vertebra, subsequent encounter for fracture with routine healing COMPARISON:  None  TECHNIQUE:  Sagittal T1, sagittal T2, sagittal inversion recovery, axial T1 and axial T2, coronal T2   IMAGE QUALITY:  Diagnostic FINDINGS: ALIGNMENT AND MARROW:  No spondylolisthesis or spondylolysis  Mild dextroscoliosis of the thoracolumbar junction  There are numerous compression fractures identified  Moderate compression fractures T11, T12, L2 and L3  These appear chronic with no associated marrow edema  There is an L4 compression fracture present which demonstrates mild loss of height of the superior endplate and diffuse marrow edema standing into the pedicles  Consistent with acute to subacute fracture  DISTAL CORD AND CONUS:  Normal size and signal within the distal cord and conus  The conus ends at the L1 level  PARASPINAL SOFT TISSUES:  Small renal cysts are noted bilaterally    Atherosclerotic disease of the aorta with saccular aneurysm along the left lateral aspect of the distal aorta just above the aortic bifurcation  The aorta measures up to 4 1 cm in maximum oblique dimension  SACRUM:  Normal signal within the sacrum  No evidence of insufficiency or stress fracture  LOWER THORACIC DISC SPACES:  Minor lower thoracic degenerative change without disc herniation, canal stenosis or foraminal narrowing  LUMBAR DISC SPACES: L1-L2:  Mild annular bulging  Mild endplate hypertrophic change  Mild canal stenosis and mild left foraminal narrowing  L2-L3:  Mild annular bulging  Small left foraminal disc protrusion  Mild canal stenosis and left foraminal narrowing  L3-L4:  Mild annular bulging  No disc herniation, canal stenosis or foraminal narrowing  L4-L5:  Slight disc desiccation  Mild annular bulging without disc herniation, canal stenosis or foraminal narrowing  L5-S1:  Normal disc height and signal   No disc herniation, canal stenosis or foraminal narrowing  Impression: Acute to subacute L4 compression fracture with only mild loss of height of the superior endplate  Multiple chronic compression fractures within the lower thoracic spine and upper lumbar spine  Mild degenerative disc disease most pronounced L1-2 and L2-3 where there is mild canal stenosis and left foraminal narrowing  Extensive atherosclerotic change of the abdominal aorta with a saccular aneurysm along the left lateral aspect of the distal aorta similar to CT scan dated 6/6/2018  The study was marked in EPIC for significant notification  Workstation performed: [id number]       Objective:     Physical Exam   Constitutional: She is oriented to person, place, and time  No distress  Thin framed female  Received in a wheel chair  HENT:   Head: Normocephalic and atraumatic  Right Ear: External ear normal    Left Ear: External ear normal    Mouth/Throat: Oropharynx is clear and moist    Eyes: Conjunctivae and EOM are normal  Pupils are equal, round, and reactive to light  Right eye exhibits no discharge  Left eye exhibits no discharge  Bilateral cataracts  Neck: Normal range of motion  Neck supple  No thyromegaly present  Cardiovascular: Normal rate, regular rhythm and normal heart sounds  Exam reveals no gallop  No murmur heard  Pulmonary/Chest: Effort normal  No respiratory distress  She has no wheezes  She has no rales  Decreased breath sounds bilaterally  Still smoking  Abdominal: Soft  Bowel sounds are normal  There is tenderness  Musculoskeletal: She exhibits no edema or deformity  Pt still with movement disorder but appears to be much less than last visit  Lymphadenopathy:     She has no cervical adenopathy  Neurological: She is alert and oriented to person, place, and time  Skin: Skin is warm and dry  She is not diaphoretic  Psychiatric: She has a normal mood and affect  Her behavior is normal  Judgment and thought content normal          Patient Instructions   Reviewed all MRIs and CAT scan with patient and   Will refer to thoracic surgery for some guidance in the abdominal aortic aneurysm which is now 4 9 cm however has doubled in the past 6 years  Will refer to Dr Trev Dixon for compression fractures of the lumbar spine  Continue your current medications and keep the follow-up with pain management  We will work on getting Prolia you need prolia for your bones        ELMA Lion

## 2018-07-10 NOTE — PATIENT INSTRUCTIONS
Reviewed all MRIs and CAT scan with patient and   Will refer to thoracic surgery for some guidance in the abdominal aortic aneurysm which is now 4 9 cm however has doubled in the past 6 years  Will refer to Dr Mally Sahni for compression fractures of the lumbar spine  Continue your current medications and keep the follow-up with pain management  We will work on getting Prolia you need prolia for your bones

## 2018-07-16 DIAGNOSIS — F41.9 ANXIETY: ICD-10-CM

## 2018-07-16 RX ORDER — CLONAZEPAM 0.5 MG/1
TABLET ORAL
Qty: 60 TABLET | Refills: 1 | Status: SHIPPED | OUTPATIENT
Start: 2018-07-16 | End: 2018-09-18 | Stop reason: SDUPTHER

## 2018-07-25 ENCOUNTER — OFFICE VISIT (OUTPATIENT)
Dept: FAMILY MEDICINE CLINIC | Facility: CLINIC | Age: 78
End: 2018-07-25
Payer: MEDICARE

## 2018-07-25 DIAGNOSIS — M80.00XG OSTEOPOROSIS WITH CURRENT PATHOLOGICAL FRACTURE WITH DELAYED HEALING, UNSPECIFIED OSTEOPOROSIS TYPE, SUBSEQUENT ENCOUNTER: Primary | ICD-10-CM

## 2018-07-25 PROCEDURE — 99211 OFF/OP EST MAY X REQ PHY/QHP: CPT

## 2018-07-25 PROCEDURE — 96372 THER/PROPH/DIAG INJ SC/IM: CPT

## 2018-07-31 DIAGNOSIS — G89.4 CHRONIC PAIN SYNDROME: ICD-10-CM

## 2018-07-31 RX ORDER — OXYCODONE AND ACETAMINOPHEN 7.5; 325 MG/1; MG/1
1 TABLET ORAL EVERY 8 HOURS PRN
Qty: 90 TABLET | Refills: 0 | Status: CANCELLED | OUTPATIENT
Start: 2018-07-31 | End: 2018-08-30

## 2018-07-31 RX ORDER — OXYCODONE AND ACETAMINOPHEN 7.5; 325 MG/1; MG/1
1 TABLET ORAL 3 TIMES DAILY
Qty: 21 TABLET | Refills: 0 | Status: SHIPPED | OUTPATIENT
Start: 2018-07-31 | End: 2018-08-06 | Stop reason: SDUPTHER

## 2018-07-31 NOTE — TELEPHONE ENCOUNTER
Mari Giles,    Call back # [phone]  Dr Rob Jaramillo       Patient will be out of medication tonight    is requesting refill on oxyCODONE-acetaminophen (PERCOCET) 7 5-325 MG           [facility]/pharmacy #[id number], [address], [address] [place] [address], [address] [place] [address] (Phone)  [phone] (Fax

## 2018-08-06 ENCOUNTER — OFFICE VISIT (OUTPATIENT)
Dept: PAIN MEDICINE | Facility: CLINIC | Age: 78
End: 2018-08-06
Payer: MEDICARE

## 2018-08-06 VITALS
WEIGHT: 91 LBS | BODY MASS INDEX: 17.87 KG/M2 | HEART RATE: 114 BPM | RESPIRATION RATE: 18 BRPM | DIASTOLIC BLOOD PRESSURE: 76 MMHG | HEIGHT: 60 IN | SYSTOLIC BLOOD PRESSURE: 116 MMHG

## 2018-08-06 DIAGNOSIS — G89.4 CHRONIC PAIN SYNDROME: ICD-10-CM

## 2018-08-06 DIAGNOSIS — Z79.891 LONG-TERM CURRENT USE OF OPIATE ANALGESIC: ICD-10-CM

## 2018-08-06 DIAGNOSIS — S32.030D CLOSED WEDGE COMPRESSION FRACTURE OF THIRD LUMBAR VERTEBRA WITH ROUTINE HEALING, SUBSEQUENT ENCOUNTER: ICD-10-CM

## 2018-08-06 DIAGNOSIS — F11.20 UNCOMPLICATED OPIOID DEPENDENCE (HCC): Primary | ICD-10-CM

## 2018-08-06 PROCEDURE — 99214 OFFICE O/P EST MOD 30 MIN: CPT | Performed by: ANESTHESIOLOGY

## 2018-08-06 RX ORDER — OXYCODONE HYDROCHLORIDE AND ACETAMINOPHEN 5; 325 MG/1; MG/1
1 TABLET ORAL EVERY 8 HOURS PRN
Qty: 90 TABLET | Refills: 0 | Status: SHIPPED | OUTPATIENT
Start: 2018-09-05 | End: 2018-08-06

## 2018-08-06 RX ORDER — FENTANYL 25 UG/H
PATCH TRANSDERMAL
Qty: 10 PATCH | Refills: 0 | Status: SHIPPED | OUTPATIENT
Start: 2018-09-05 | End: 2018-10-01 | Stop reason: SDUPTHER

## 2018-08-06 RX ORDER — OXYCODONE AND ACETAMINOPHEN 7.5; 325 MG/1; MG/1
1 TABLET ORAL EVERY 8 HOURS PRN
Qty: 90 TABLET | Refills: 0 | Status: SHIPPED | OUTPATIENT
Start: 2018-08-06 | End: 2018-08-06 | Stop reason: SDUPTHER

## 2018-08-06 RX ORDER — OXYCODONE HYDROCHLORIDE AND ACETAMINOPHEN 5; 325 MG/1; MG/1
1 TABLET ORAL EVERY 8 HOURS PRN
Qty: 90 TABLET | Refills: 0 | Status: SHIPPED | OUTPATIENT
Start: 2018-08-06 | End: 2018-08-06 | Stop reason: SDUPTHER

## 2018-08-06 RX ORDER — FENTANYL 25 UG/H
PATCH TRANSDERMAL
Qty: 10 PATCH | Refills: 0 | Status: SHIPPED | OUTPATIENT
Start: 2018-08-06 | End: 2018-08-06 | Stop reason: SDUPTHER

## 2018-08-06 RX ORDER — OXYCODONE AND ACETAMINOPHEN 7.5; 325 MG/1; MG/1
1 TABLET ORAL EVERY 8 HOURS PRN
Qty: 90 TABLET | Refills: 0 | Status: SHIPPED | OUTPATIENT
Start: 2018-09-05 | End: 2018-10-01 | Stop reason: SDUPTHER

## 2018-08-06 NOTE — PROGRESS NOTES
Assessment:  1  Uncomplicated opioid dependence ([facility] [address] )     2  Chronic pain syndrome  oxyCODONE-acetaminophen (PERCOCET) 5-325 mg per tablet    fentaNYL (DURAGESIC) 25 mcg/hr    DISCONTINUED: fentaNYL (DURAGESIC) 25 mcg/hr    DISCONTINUED: oxyCODONE-acetaminophen (PERCOCET) 5-325 mg per tablet   3  Long-term current use of opiate analgesic     4  Closed wedge compression fracture of third lumbar vertebra with routine healing, subsequent encounter         Plan:  The patient is a 70-year-old female with a history of chronic pain syndrome secondary to thoracic radiculopathy, stemming from multiple thoracic and lumbar compression fractures causing radicular symptoms   Patient is on fentanyl patch 25 mcg Q 72 hours and Oxycodone acetaminophen 7 5/325 mg q8hrs  In addition, patient takes gabapentin 300 mg p o  T i d  And Flexeril 10 mg p o  T i d  To help with neuropathic pain and muscle spasms respectively  Patient denies aberrant behavior  Patient reports adequate pain relief  Patient continues to perform ADLs without difficulty  Patient reports constipation and is on miralax which she states she has to take more consistently  Today, patient was given a prescription refill for oxycodone acetaminophen 7 5/325 mg p o  T i d  P r n  Pain and also a prescription for fentanyl patch 25 mcg Q 72 hours  Prescriptions were dated to be filled today August 6, 2018 and for next month September 5, 2018  I will see her back in 8 weeks for reassessment and medication refill  Patient verbalized understanding  Patient has sufficient supplies of gabapentin as well as Flexeril left over  She will continue to take it as instructed  There are risks associated with opioid medications, including dependence, addiction and tolerance  The patient understands and agrees to use these medications only as prescribed   Potential side effects of the medications include, but are not limited to, constipation, drowsiness, addiction, impaired judgment and risk of fatal overdose if not taken as prescribed  The patient was warned against driving while taking sedation medications  Sharing medications is a felony  At this point in time, the patient is showing no signs of addiction, abuse, diversion or suicidal ideation  [place] Prescription Drug Monitoring Program report was reviewed and was appropriate     My impressions and treatment recommendations were discussed in detail with the patient who verbalized understanding and had no further questions  Discharge instructions were provided  I personally saw and examined the patient and I agree with the above discussed plan of care  History of Present Illness:  Sudha Abrams is a 66 y o  female who presents for a follow up office visit in regards to Back Pain  The patients current symptoms include low back pain which is constant and sharp  She states that her pain is a bit better today  Patient denies aberrant behavior  Patient reports adequate pain relief  Patient continues to perform ADLs without difficulty  Patient reports constipation  She takes miralax but not consistently  She is here for routine medication refill  Patient is currently on fentanyl patch 25 mcg Q 72 hours and oxycodone-acetaminophen 7 5/325 mg p o  T i d  P r n  For breakthrough pain  She reports approximately 50% pain relief  She has constipation with some relief with MiraLax  Pain Contract Signed: 2/6/18, Last Urine Drug Screen: 6/4/18    I have personally reviewed and/or updated the patient's past medical history, past surgical history, family history, social history, current medications, allergies, and vital signs today  Review of Systems   Respiratory: Negative for shortness of breath  Cardiovascular: Negative for chest pain  Gastrointestinal: Positive for constipation  Negative for diarrhea, nausea and vomiting  Musculoskeletal: Positive for gait problem   Negative for arthralgias, joint swelling and myalgias  Skin: Negative for rash  Neurological: Negative for dizziness, seizures and weakness  All other systems reviewed and are negative  Patient Active Problem List   Diagnosis    Abdominal pain    Abnormal involuntary movements    Aortic aneurysm (HCC)    Cataract, bilateral    Chronic pain syndrome    Closed fracture of T11 vertebra with routine healing    COPD, mild (HCC)    Generalized weakness    HTN (hypertension)    Hyponatremia    Irritable bowel syndrome without diarrhea    Osteoporosis    Thoracic radiculopathy    Closed fracture of third lumbar vertebra with routine healing    Long-term current use of opiate analgesic    Uncomplicated opioid dependence ([facility] )       Past Medical History:   Diagnosis Date    Abdominal aortic aneurysm (AAA) 3 0 cm to 5 0 [facility] ) 06/08/2018    4 9 cm     Anxiety     Chronic obstructive lung disease (HCC)     Compression fracture of thoracic vertebra (HCC)     last assessed 2/20/17    Depression     Dysthymia     Failure to thrive in adult     [facility]     Hypertension     benign essential    Hyponatremia        Past Surgical History:   Procedure Laterality Date    APPENDECTOMY      HYSTERECTOMY         Family History   Problem Relation Age of Onset    Cirrhosis Mother         hepatic    Bone cancer Father        Social History     Occupational History    Not on file       Social History Main Topics    Smoking status: Current Every Day Smoker    Smokeless tobacco: Never Used    Alcohol use Yes      Comment: conflicting both occasionally an no use per Allscripts    Drug use: No    Sexual activity: Not on file       Current Outpatient Prescriptions on File Prior to Visit   Medication Sig    aspirin 81 MG tablet Take by mouth    bifidobacterium infantis (ALIGN) capsule Take by mouth    Calcium Carb-Cholecalciferol (CALCIUM 600 + D) 600-200 MG-UNIT TABS Take by mouth  clonazePAM (KlonoPIN) 0 5 mg tablet Take 1/2 to 1 po bid prn    cyclobenzaprine (FLEXERIL) 10 mg tablet TAKE 1 TABLET BY MOUTH AT BEDTIME    fentaNYL (DURAGESIC) 25 mcg/hr Earliest Fill Date: 7/6/18 Place 1 patch q72hrs    gabapentin (NEURONTIN) 300 mg capsule TAKE 1 CAPSULE THREE TIMES A DAY    Ibuprofen-Diphenhydramine Cit (ADVIL PM) 200-38 MG TABS Take 2 tablets by mouth    lidocaine (LIDODERM) 5 % Place 1 patch on the skin daily Remove & Discard patch within 12 hours or as directed by MD    Multiple Vitamins-Minerals (MULTIVITAMIN ADULT PO) Take by mouth    multivitamin (THERAGRAN) TABS Take 1 tablet by mouth daily    oxyCODONE-acetaminophen (PERCOCET) 5-325 mg per tablet Take 1 tablet by mouth every 8 (eight) hours as needed for moderate pain Max Daily Amount: 3 tablets    oxyCODONE-acetaminophen (PERCOCET) 7 5-325 MG per tablet Take 1 tablet by mouth 3 (three) times a day for 7 days Max Daily Amount: 3 tablets    Psyllium (METAMUCIL) 28 3 % POWD Take by mouth    traZODone (DESYREL) 50 mg tablet Take 1/2 to 1 tablet by mouth at bedtime as needed     No current facility-administered medications on file prior to visit  No Known Allergies    Physical Exam:    /76   Pulse (!) 114   Resp 18   Ht 5' (1 524 m)   Wt 41 3 kg (91 lb)   BMI 17 77 kg/m²     Constitutional:normal, well developed, well nourished, alert, in no distress and non-toxic and no overt pain behavior    Eyes:anicteric  HEENT:grossly intact  Neck:supple, symmetric, trachea midline and no masses   Pulmonary:even and unlabored  Cardiovascular:No edema or pitting edema present  Skin:Normal without rashes or lesions and well hydrated  Psychiatric:Mood and affect appropriate  Neurologic:involuntary movements and lip smacking  Musculoskeletal:in wheelchair    Imaging

## 2018-09-17 DIAGNOSIS — M54.14 THORACIC RADICULOPATHY: ICD-10-CM

## 2018-09-17 DIAGNOSIS — G47.00 INSOMNIA, UNSPECIFIED TYPE: ICD-10-CM

## 2018-09-17 DIAGNOSIS — S22.000G CLOSED WEDGE FRACTURE OF THORACIC VERTEBRA WITH DELAYED HEALING, UNSPECIFIED THORACIC VERTEBRAL LEVEL, SUBSEQUENT ENCOUNTER: ICD-10-CM

## 2018-09-17 DIAGNOSIS — G89.4 CHRONIC PAIN DISORDER: ICD-10-CM

## 2018-09-17 RX ORDER — CYCLOBENZAPRINE HCL 10 MG
TABLET ORAL
Qty: 30 TABLET | Refills: 1 | Status: SHIPPED | OUTPATIENT
Start: 2018-09-17 | End: 2018-10-01 | Stop reason: SDUPTHER

## 2018-09-17 RX ORDER — TRAZODONE HYDROCHLORIDE 50 MG/1
TABLET ORAL
Qty: 90 TABLET | Refills: 0 | Status: SHIPPED | OUTPATIENT
Start: 2018-09-17 | End: 2018-12-10 | Stop reason: SDUPTHER

## 2018-09-18 ENCOUNTER — TELEPHONE (OUTPATIENT)
Dept: FAMILY MEDICINE CLINIC | Facility: CLINIC | Age: 78
End: 2018-09-18

## 2018-09-18 DIAGNOSIS — F41.9 ANXIETY: ICD-10-CM

## 2018-09-18 RX ORDER — CLONAZEPAM 0.5 MG/1
TABLET ORAL
Qty: 60 TABLET | Refills: 0 | Status: SHIPPED | OUTPATIENT
Start: 2018-09-18 | End: 2018-10-16 | Stop reason: SDUPTHER

## 2018-09-25 DIAGNOSIS — M54.14 THORACIC RADICULOPATHY: ICD-10-CM

## 2018-09-25 DIAGNOSIS — G89.4 CHRONIC PAIN DISORDER: ICD-10-CM

## 2018-09-25 DIAGNOSIS — S22.000G CLOSED WEDGE FRACTURE OF THORACIC VERTEBRA WITH DELAYED HEALING, UNSPECIFIED THORACIC VERTEBRAL LEVEL, SUBSEQUENT ENCOUNTER: ICD-10-CM

## 2018-09-25 RX ORDER — GABAPENTIN 300 MG/1
CAPSULE ORAL
Qty: 90 CAPSULE | Refills: 2 | Status: SHIPPED | OUTPATIENT
Start: 2018-09-25 | End: 2018-11-26 | Stop reason: SDUPTHER

## 2018-10-01 ENCOUNTER — OFFICE VISIT (OUTPATIENT)
Dept: PAIN MEDICINE | Facility: CLINIC | Age: 78
End: 2018-10-01
Payer: MEDICARE

## 2018-10-01 VITALS
SYSTOLIC BLOOD PRESSURE: 143 MMHG | HEIGHT: 60 IN | DIASTOLIC BLOOD PRESSURE: 94 MMHG | WEIGHT: 89.2 LBS | BODY MASS INDEX: 17.51 KG/M2 | HEART RATE: 103 BPM

## 2018-10-01 DIAGNOSIS — F11.20 UNCOMPLICATED OPIOID DEPENDENCE (HCC): Primary | ICD-10-CM

## 2018-10-01 DIAGNOSIS — Z79.891 LONG-TERM CURRENT USE OF OPIATE ANALGESIC: ICD-10-CM

## 2018-10-01 DIAGNOSIS — G89.4 CHRONIC PAIN DISORDER: ICD-10-CM

## 2018-10-01 DIAGNOSIS — M54.14 THORACIC RADICULOPATHY: ICD-10-CM

## 2018-10-01 DIAGNOSIS — S22.000G CLOSED WEDGE FRACTURE OF THORACIC VERTEBRA WITH DELAYED HEALING, UNSPECIFIED THORACIC VERTEBRAL LEVEL, SUBSEQUENT ENCOUNTER: ICD-10-CM

## 2018-10-01 DIAGNOSIS — G89.4 CHRONIC PAIN SYNDROME: ICD-10-CM

## 2018-10-01 PROCEDURE — 99214 OFFICE O/P EST MOD 30 MIN: CPT | Performed by: ANESTHESIOLOGY

## 2018-10-01 RX ORDER — FENTANYL 25 UG/H
PATCH TRANSDERMAL
Qty: 10 PATCH | Refills: 0 | Status: SHIPPED | OUTPATIENT
Start: 2018-11-04 | End: 2018-12-11 | Stop reason: SDUPTHER

## 2018-10-01 RX ORDER — CYCLOBENZAPRINE HCL 10 MG
10 TABLET ORAL
Qty: 30 TABLET | Refills: 1 | Status: SHIPPED | OUTPATIENT
Start: 2018-10-01 | End: 2018-11-26 | Stop reason: SDUPTHER

## 2018-10-01 RX ORDER — OXYCODONE AND ACETAMINOPHEN 7.5; 325 MG/1; MG/1
1 TABLET ORAL EVERY 8 HOURS PRN
Qty: 90 TABLET | Refills: 0 | Status: SHIPPED | OUTPATIENT
Start: 2018-10-05 | End: 2018-10-01 | Stop reason: SDUPTHER

## 2018-10-01 RX ORDER — FENTANYL 25 UG/H
PATCH TRANSDERMAL
Qty: 10 PATCH | Refills: 0 | Status: SHIPPED | OUTPATIENT
Start: 2018-10-05 | End: 2018-10-01 | Stop reason: SDUPTHER

## 2018-10-01 RX ORDER — OXYCODONE AND ACETAMINOPHEN 7.5; 325 MG/1; MG/1
1 TABLET ORAL EVERY 8 HOURS PRN
Qty: 90 TABLET | Refills: 0 | Status: SHIPPED | OUTPATIENT
Start: 2018-11-04 | End: 2018-11-26 | Stop reason: SDUPTHER

## 2018-10-01 NOTE — PROGRESS NOTES
Assessment:  1  Uncomplicated opioid dependence ([facility] [address] )     2  Chronic pain syndrome  fentaNYL (DURAGESIC) 25 mcg/hr    oxyCODONE-acetaminophen (PERCOCET) 7 5-325 MG per tablet    DISCONTINUED: oxyCODONE-acetaminophen (PERCOCET) 7 5-325 MG per tablet    DISCONTINUED: fentaNYL (DURAGESIC) 25 mcg/hr   3  Chronic pain disorder  cyclobenzaprine (FLEXERIL) 10 mg tablet   4  Thoracic radiculopathy  cyclobenzaprine (FLEXERIL) 10 mg tablet   5  Closed wedge fracture of thoracic vertebra with delayed healing, unspecified thoracic vertebral level, subsequent encounter  cyclobenzaprine (FLEXERIL) 10 mg tablet   6  Long-term current use of opiate analgesic           Plan:  The patient is a 79-year-old female with a history of chronic pain syndrome secondary to thoracic radiculopathy, stemming from multiple thoracic and lumbar compression fractures causing radicular symptoms   Patient is on fentanyl patch 25 mcg Q 72 hours and Oxycodone acetaminophen 7 5/325 mg q8hrs prn   In addition, patient takes gabapentin 300 mg p o  T i d  And Flexeril 10 mg p o  QHS  Patient denies aberrant behavior  Patient reports adequate pain relief  Patient continues to perform ADLs without difficulty  Patient reports constipation and is on miralax which helps  Today, patient was given a prescription refill for oxycodone acetaminophen 7 5/325 mg p o  T i d  P r n  Pain and also a prescription for fentanyl patch 25 mcg Q 72 hours  Prescriptions were dated to be filled today October 5th, 2018 and for next month November 4th, 2018  I will see her back in 8 weeks for reassessment and medication refill  [place] Prescription Drug Monitoring Program report was reviewed and was appropriate     There are risks associated with opioid medications, including dependence, addiction and tolerance  The patient understands and agrees to use these medications only as prescribed   Potential side effects of the medications include, but are not limited to, constipation, drowsiness, addiction, impaired judgment and risk of fatal overdose if not taken as prescribed  The patient was warned against driving while taking sedation medications  Sharing medications is a felony  At this point in time, the patient is showing no signs of addiction, abuse, diversion or suicidal ideation  The patient will follow-up in 8 weeks for medication prescription refill and reevaluation  The patient was advised to contact the office should their symptoms worsen in the interim  The patient was agreeable and verbalized an understanding  History of Present Illness: The patient is a 66 y o  female last seen on 8/6/18 who presents for a follow up office visit in regards to chronic pain secondary to chronic pain syndrome  The patient currently reports Constant, sharp pain across the low back  The patient is currently on fentanyl patch 25 mcg Q 72 hours and oxycodone acetaminophen 7 5/325 mg p o  T i d  P r n  For breakthrough pain  Patient reports 50% pain relief  Patient is currently on MiraLax for constipation with some relief  Pain Contract Signed: 2/6/18, Last Urine Drug Screen: 6/4/18  I have personally reviewed and/or updated the patient's past medical history, past surgical history, family history, social history, current medications, allergies, and vital signs today         Review of Systems:    Review of Systems      Past Medical History:   Diagnosis Date    Abdominal aortic aneurysm (AAA) 3 0 cm to 5 0 cm in diameter in female ([facility] [address] ) 06/08/2018    4 9 cm     Anxiety     Chronic obstructive lung disease (HCC)     Compression fracture of thoracic vertebra (HCC)     last assessed 2/20/17    Depression     Dysthymia     Failure to thrive in adult     Fracture of lumbar vertebra (HCC)     Hypertension     benign essential    Hyponatremia        Past Surgical History:   Procedure Laterality Date    APPENDECTOMY      HYSTERECTOMY         Family History Problem Relation Age of Onset    Cirrhosis Mother         hepatic    Bone cancer Father        Social History     Occupational History    Not on file       Social History Main Topics    Smoking status: Current Every Day Smoker    Smokeless tobacco: Never Used    Alcohol use Yes      Comment: conflicting both occasionally an no use per [employer]    Drug use: No    Sexual activity: Not on file         Current Outpatient Prescriptions:     aspirin 81 MG tablet, Take by mouth, Disp: , Rfl:     bifidobacterium infantis (ALIGN) capsule, Take by mouth, Disp: , Rfl:     Calcium Carb-Cholecalciferol (CALCIUM 600 + D) 600-200 MG-UNIT TABS, Take by mouth, Disp: , Rfl:     clonazePAM (KlonoPIN) 0 5 mg tablet, Take 1/2 to 1 po bid prn, Disp: 60 tablet, Rfl: 0    cyclobenzaprine (FLEXERIL) 10 mg tablet, TAKE 1 TABLET BY MOUTH AT BEDTIME, Disp: 30 tablet, Rfl: 1    fentaNYL (DURAGESIC) 25 mcg/hr, Earliest Fill Date: 9/5/18 Place 1 patch q72hrs, Disp: 10 patch, Rfl: 0    gabapentin (NEURONTIN) 300 mg capsule, TAKE 1 CAPSULE THREE TIMES A DAY, Disp: 90 capsule, Rfl: 2    lidocaine (LIDODERM) 5 %, Place 1 patch on the skin daily Remove & Discard patch within 12 hours or as directed by MD, Disp: 30 patch, Rfl: 0    Multiple Vitamins-Minerals (MULTIVITAMIN ADULT PO), Take by mouth, Disp: , Rfl:     oxyCODONE-acetaminophen (PERCOCET) 7 5-325 MG per tablet, Take 1 tablet by mouth every 8 (eight) hours as needed for moderate pain for up to 30 days Earliest Fill Date: 9/5/18 Max Daily Amount: 3 tablets, Disp: 90 tablet, Rfl: 0    Psyllium (METAMUCIL) 28 3 % POWD, Take by mouth, Disp: , Rfl:     traZODone (DESYREL) 50 mg tablet, TAKE 1/2 TO 1 TABLET BY MOUTH AT BEDTIME AS NEEDED, Disp: 90 tablet, Rfl: 0    Ibuprofen-Diphenhydramine Cit (ADVIL PM) 200-38 MG TABS, Take 2 tablets by mouth, Disp: , Rfl:     multivitamin (THERAGRAN) TABS, Take 1 tablet by mouth daily, Disp: , Rfl:     No Known Allergies    Physical Exam:    /94   Pulse 103   Ht 5' (1 524 m)   Wt 40 5 kg (89 lb 3 2 oz)   BMI 17 42 kg/m²     Constitutional:normal, well developed, well nourished, alert, in no distress and non-toxic and no overt pain behavior  Eyes:anicteric  HEENT:grossly intact  Neck:supple, symmetric, trachea midline and no masses   Pulmonary:even and unlabored  Cardiovascular:No edema or pitting edema present  Skin:Normal without rashes or lesions and well hydrated  Psychiatric:Mood and affect appropriate  Neurologic:Cranial Nerves II-XII grossly intact  Musculoskeletal:in wheelchair and involuntary movements       Imaging  No orders to display         No orders of the defined types were placed in this encounter

## 2018-10-16 DIAGNOSIS — F41.9 ANXIETY: ICD-10-CM

## 2018-10-17 RX ORDER — CLONAZEPAM 0.5 MG/1
TABLET ORAL
Qty: 60 TABLET | Refills: 0 | Status: SHIPPED | OUTPATIENT
Start: 2018-10-17 | End: 2018-11-13 | Stop reason: SDUPTHER

## 2018-10-19 ENCOUNTER — OFFICE VISIT (OUTPATIENT)
Dept: FAMILY MEDICINE CLINIC | Facility: CLINIC | Age: 78
End: 2018-10-19
Payer: MEDICARE

## 2018-10-19 VITALS
OXYGEN SATURATION: 78 % | WEIGHT: 89 LBS | HEART RATE: 62 BPM | BODY MASS INDEX: 17.47 KG/M2 | TEMPERATURE: 98.1 F | SYSTOLIC BLOOD PRESSURE: 110 MMHG | DIASTOLIC BLOOD PRESSURE: 68 MMHG | RESPIRATION RATE: 16 BRPM | HEIGHT: 60 IN

## 2018-10-19 DIAGNOSIS — Z12.11 COLON CANCER SCREENING: ICD-10-CM

## 2018-10-19 DIAGNOSIS — H26.9 CATARACT OF BOTH EYES, UNSPECIFIED CATARACT TYPE: ICD-10-CM

## 2018-10-19 DIAGNOSIS — S22.000G CLOSED WEDGE FRACTURE OF THORACIC VERTEBRA WITH DELAYED HEALING, UNSPECIFIED THORACIC VERTEBRAL LEVEL, SUBSEQUENT ENCOUNTER: ICD-10-CM

## 2018-10-19 DIAGNOSIS — J44.9 COPD, MILD (HCC): ICD-10-CM

## 2018-10-19 DIAGNOSIS — G89.29 OTHER CHRONIC PAIN: ICD-10-CM

## 2018-10-19 DIAGNOSIS — G25.9 MOVEMENT DISORDER: Primary | ICD-10-CM

## 2018-10-19 DIAGNOSIS — K59.00 CONSTIPATION, UNSPECIFIED CONSTIPATION TYPE: ICD-10-CM

## 2018-10-19 PROCEDURE — 99214 OFFICE O/P EST MOD 30 MIN: CPT | Performed by: FAMILY MEDICINE

## 2018-10-19 NOTE — PROGRESS NOTES
Assessment/Plan:     Chronic Problems:  Closed wedge fracture of thoracic vertebra with delayed healing  Keep the f/u appt with Dr Marcin Man  COPD, mild ([address] )  Will attempt to get pft's  Also discussed smoking cessation again  Pt states she loves her cigarettes  Movement disorder  Still suggest f/u appt with Dr Salvador Boyd at the [facility]  Aortic aneurysm [facility])  Keep the f/u with Dr Abelardo Edmondson, however the recent mri indicates now that that aneurysm is 4 1 and not 4 9  Visit Diagnosis:  Diagnoses and all orders for this visit:    Movement disorder    Other chronic pain  Comments:  Keep the f/u with Dr Louie Garces  Consider eval for cbd oil or medical marijuana  Constipation, unspecified constipation type  Comments:  Plenty of liquids  Miralax is ok  Closed wedge fracture of thoracic vertebra with delayed healing, unspecified thoracic vertebral level, subsequent encounter  Comments:  Keep the f/u with Dr Marcin Man  Cataract of both eyes, unspecified cataract type  Comments: Will refer to Dr Valerie Rodriguez  Orders:  -     Ambulatory referral to Ophthalmology; Future    COPD, mild ([address] )  Comments: Will get pft's  Orders:  -     Complete pulmonary function test; Future    Colon cancer screening  -     Cologuard          Subjective:    Patient ID: Viri Bradley is a 66 y o  female  Pt is here for routine f/u with her   Was very worried when her  had a valve replacement 6 weeks ago  He is doing well now, but pt states she is shaking and has pains in her stomach and back  Pain pills that she gets from Dr Louie Garces don't help  She did not talk to Dr Louie Garces about this  Now has fentanyl patch but does not see any difference  Has had multiple work ups for the belly pain  Pt was seen at the movement clinic but does not want to return  Feels her movements have been better over the last several months  Had an mri of the thoracic and lumbar region   Told her aneurysm went from 4 9 to 4 1 per the recent mri of the thoracic spine  Has appt with Dr Stephen Haile, vascular surgeon  BP's at home usually in the 130's/80's  Pt does not want to take any more pain pills  Pt does not want a flu shot, pneumonia shot and does not want anymore mammos  Takes all other meds as directed  No side effects noted  The following portions of the patient's history were reviewed and updated as appropriate: allergies, current medications, past family history, past medical history, past social history, past surgical history and problem list     Review of Systems   Constitutional: Positive for fatigue  Negative for chills, diaphoresis and fever  HENT: Negative  Eyes: Negative  Respiratory: Negative for cough, shortness of breath and wheezing  Cardiovascular: Negative for chest pain and palpitations  Gastrointestinal: Positive for abdominal pain  Negative for diarrhea, nausea and vomiting  Had rectal incontinence 2 days ago  Genitourinary: Negative  Musculoskeletal: Positive for gait problem  Negative for arthralgias and joint swelling  Pt has f/u with Dr David Coffman for her compression fractures  Neurological: Negative for dizziness, light-headedness and headaches  Psychiatric/Behavioral: Negative for dysphoric mood  The patient is not nervous/anxious  But gets fed up with her situation  Pt is home bound with a severe movement disorder  /68   Pulse 62   Temp 98 1 °F (36 7 °C)   Resp 16   Ht 5' (1 524 m)   Wt 40 4 kg (89 lb)   SpO2 (!) 78%   BMI 17 38 kg/m²   Social History     Social History    Marital status: /Civil Union     Spouse name: N/A    Number of children: N/A    Years of education: N/A     Occupational History    Not on file       Social History Main Topics    Smoking status: Current Every Day Smoker    Smokeless tobacco: Never Used    Alcohol use Yes      Comment: conflicting both occasionally an no use per [employer]scripts    Drug use: No    Sexual activity: Not on file     Other Topics Concern    Not on file     Social History Narrative    Always uses seatbelt         Past Medical History:   Diagnosis Date    Abdominal aortic aneurysm (AAA) 3 0 cm to 5 0 cm in diameter in female ([facility] [address] ) 06/08/2018    4 9 cm     Anxiety     Chronic obstructive lung disease (HCC)     Compression fracture of thoracic vertebra (HCC)     last assessed 2/20/17    Depression     Dysthymia     Failure to thrive in adult     Fracture of lumbar vertebra ([facility] [address] )     Hypertension     benign essential    Hyponatremia      Family History   Problem Relation Age of Onset    Cirrhosis Mother         hepatic    Bone cancer Father      Past Surgical History:   Procedure Laterality Date    APPENDECTOMY      HYSTERECTOMY         Current Outpatient Prescriptions:     aspirin 81 MG tablet, Take by mouth, Disp: , Rfl:     bifidobacterium infantis (ALIGN) capsule, Take by mouth, Disp: , Rfl:     Calcium Carb-Cholecalciferol (CALCIUM 600 + D) 600-200 MG-UNIT TABS, Take by mouth, Disp: , Rfl:     clonazePAM (KlonoPIN) 0 5 mg tablet, Take 1/2 to 1 po bid prn, Disp: 60 tablet, Rfl: 0    cyclobenzaprine (FLEXERIL) 10 mg tablet, Take 1 tablet (10 mg total) by mouth daily at bedtime for 30 days, Disp: 30 tablet, Rfl: 1    [START ON 11/4/2018] fentaNYL (DURAGESIC) 25 mcg/hr, Earliest Fill Date: 11/4/18 Place 1 patch q72hrs, Disp: 10 patch, Rfl: 0    gabapentin (NEURONTIN) 300 mg capsule, TAKE 1 CAPSULE THREE TIMES A DAY, Disp: 90 capsule, Rfl: 2    Ibuprofen-Diphenhydramine Cit (ADVIL PM) 200-38 MG TABS, Take 2 tablets by mouth, Disp: , Rfl:     lidocaine (LIDODERM) 5 %, Place 1 patch on the skin daily Remove & Discard patch within 12 hours or as directed by MD, Disp: 30 patch, Rfl: 0    Multiple Vitamins-Minerals (MULTIVITAMIN ADULT PO), Take by mouth, Disp: , Rfl:     multivitamin (THERAGRAN) TABS, Take 1 tablet by mouth daily, Disp: , Rfl:     [START ON 11/4/2018] oxyCODONE-acetaminophen (PERCOCET) 7 5-325 MG per tablet, Take 1 tablet by mouth every 8 (eight) hours as needed for moderate pain for up to 30 days Earliest Fill Date: 11/4/18 Max Daily Amount: 3 tablets, Disp: 90 tablet, Rfl: 0    Psyllium (METAMUCIL) 28 3 % POWD, Take by mouth, Disp: , Rfl:     traZODone (DESYREL) 50 mg tablet, TAKE 1/2 TO 1 TABLET BY MOUTH AT BEDTIME AS NEEDED, Disp: 90 tablet, Rfl: 0    No Known Allergies       Lab Review   No visits with results within 2 Month(s) from this visit  Latest known visit with results is:   Admission on 05/15/2018, Discharged on 05/15/2018   Component Date Value    WBC 05/15/2018 9 12     RBC 05/15/2018 4 52     Hemoglobin 05/15/2018 13 7     Hematocrit 05/15/2018 41 5     MCV 05/15/2018 92     MCH 05/15/2018 30 3     MCHC 05/15/2018 33 0     RDW 05/15/2018 13 2     MPV 05/15/2018 9 2     Platelets 27/00/5217 323     nRBC 05/15/2018 0     Neutrophils Relative 05/15/2018 63     Immat GRANS % 05/15/2018 0     Lymphocytes Relative 05/15/2018 26     Monocytes Relative 05/15/2018 9     Eosinophils Relative 05/15/2018 1     Basophils Relative 05/15/2018 1     Neutrophils Absolute 05/15/2018 5 69     Immature Grans Absolute 05/15/2018 0 04     Lymphocytes Absolute 05/15/2018 2 39     Monocytes Absolute 05/15/2018 0 83     Eosinophils Absolute 05/15/2018 0 08     Basophils Absolute 05/15/2018 0 09     Sodium 05/15/2018 136     Potassium 05/15/2018 3 6     Chloride 05/15/2018 99*    CO2 05/15/2018 29     ANION GAP 05/15/2018 8     BUN 05/15/2018 15     Creatinine 05/15/2018 0 83     Glucose 05/15/2018 102     Calcium 05/15/2018 9 7     eGFR 05/15/2018 68     Magnesium 05/15/2018 2 1     TSH 3RD GENERATON 05/15/2018 0 865         Imaging: No results found  Objective:     Physical Exam   Constitutional: She is oriented to person, place, and time  She appears well-developed and well-nourished  No distress     HENT:   Head: Normocephalic and atraumatic  Right Ear: External ear normal    Left Ear: External ear normal    Mouth/Throat: Oropharynx is clear and moist    Eyes: Pupils are equal, round, and reactive to light  Conjunctivae are normal    Bilateral cataracts  Neck: Normal range of motion  Neck supple  Cardiovascular: Normal rate, regular rhythm and normal heart sounds  Difficult to auscultate r/t severe Upper body and arms and head movements  Pulmonary/Chest: Effort normal and breath sounds normal  No respiratory distress  Repeat pulse ox by me 91%  Smells like a cigarette  Abdominal: Soft  Bowel sounds are normal    Musculoskeletal: She exhibits no tenderness or deformity  Pt with severe upper body, arm, head and torso movements  Neurological: She is alert and oriented to person, place, and time  She exhibits abnormal muscle tone  Coordination abnormal    Skin: Skin is warm and dry  She is not diaphoretic  Psychiatric: She has a normal mood and affect  Her behavior is normal  Judgment and thought content normal          Patient Instructions   Discussed all with patient and   We also suspect that the abdominal pain is coming from the constipation  Narcotics can cause constipation you need to make sure you are drinking plenty of liquids  Keep the follow-up with Dr Andreea Owen  For the aortic aneurysm however it appears that it was 4 9 and is now 4 1 on the latest MRI you change your mind and wanted an evaluation for someone that can give her a trial of CBD oil or medical marijuana call me and I will give her an appointment  Make your appointment with Dr Jeri Russell for the cataracts as this can help her  Ok to use miralax for the constipation  Pt refuses immunizations and further mammo's/ have the cologuard done         ELMA Wong

## 2018-10-19 NOTE — ASSESSMENT & PLAN NOTE
Keep the f/u with Dr Liya Mcdaniel, however the recent mri indicates now that that aneurysm is 4 1 and not 4 9

## 2018-10-19 NOTE — PATIENT INSTRUCTIONS
Discussed all with patient and   We also suspect that the abdominal pain is coming from the constipation  Narcotics can cause constipation you need to make sure you are drinking plenty of liquids  Keep the follow-up with Dr Jo Ann Yeboah  For the aortic aneurysm however it appears that it was 4 9 and is now 4 1 on the latest MRI you change your mind and wanted an evaluation for someone that can give her a trial of CBD oil or medical marijuana call me and I will give her an appointment  Make your appointment with Dr Davina Bettencourt for the cataracts as this can help her  Ok to use miralax for the constipation  Pt refuses immunizations and further mammo's/ have the cologuard done

## 2018-10-22 ENCOUNTER — OFFICE VISIT (OUTPATIENT)
Dept: VASCULAR SURGERY | Facility: CLINIC | Age: 78
End: 2018-10-22
Payer: MEDICARE

## 2018-10-22 VITALS
HEIGHT: 60 IN | DIASTOLIC BLOOD PRESSURE: 64 MMHG | TEMPERATURE: 97.9 F | BODY MASS INDEX: 17.47 KG/M2 | RESPIRATION RATE: 18 BRPM | WEIGHT: 89 LBS | SYSTOLIC BLOOD PRESSURE: 118 MMHG | HEART RATE: 68 BPM

## 2018-10-22 DIAGNOSIS — I71.4 ABDOMINAL AORTIC ANEURYSM (AAA) WITHOUT RUPTURE (HCC): Primary | ICD-10-CM

## 2018-10-22 DIAGNOSIS — Z72.0 TOBACCO ABUSE: ICD-10-CM

## 2018-10-22 PROCEDURE — 99204 OFFICE O/P NEW MOD 45 MIN: CPT | Performed by: PHYSICIAN ASSISTANT

## 2018-10-22 NOTE — ASSESSMENT & PLAN NOTE
Jono Obregon is a 65 y/o F HX obacco HTN, COPD, Hyponatremia, involuntary movement disorder, DDD and osteoporosis  She was incidentally found to have AAA on CT imaging study performed for other purpose  CT abdomen pelvis w contrast 6/6/18:  -Partially thrombosed AAA, 4 9 cm in diameter  This measured 2 5 cm in 2012  MRI Lumbar spine w/o contrast 6/28/18:  -Extensive atherosclerotic change of the abdominal aorta with a saccular aneurysm along the left lateral aspect of the distal aorta similar to CT scan dated 6/6/2018  AAA was noted as 4 1 cm  The imaging studies above were reviewed personally by me with Dr Catrachito Villafana  The infrarenal AAA appears large  It is has a very lobular component and the aorta itself is quite angulated  This study is technically limited for the purpose of AAA evaluation and inadequate identify structures and measure the AAA  Exam is difficult as she was unable to get onto the table and examined in chair, as well as underlying movement disorder  Non-tender Ao  2+ Fem pulses and 2+ DP  Feet warm; no wounds  Good DP/PT signals  + venous varicosities  Plan: We had a detailed discussion regarding management and treatment of AAA  We discussed risk factors and the need for routine surveillance  Pt education was provided  We will see her back after CTA of abd      -Recommend dedicated CT Angiogram of the abdominal aorta which we will schedule at next available    -Smoking is associated with enlarging AAA  Smoking cessation is strongly suggested due to AAA, as well as for your overall health  -OMT for any co-morbidities: HTN, HLD and other risk factors  Continue aspirin 81 MG  There is some data for ACE inhibitors or losartan (ABR) which may decrease the rate of AAA expansion  We will defer ACE/ARB to PCP     -You may engage in moderate physical activity as tolerated which will help your overall cardiovascular health and fitness   -Consider screening of first-degree family members for AAA    -Routine surveillance by imaging studies per protocol     -Symptoms of rupture, such as severe back / abdominal pain were discussed with patient   -Follow up after CTA

## 2018-10-22 NOTE — PROGRESS NOTES
Assessment/Plan:    Abdominal aortic aneurysm (AAA) without rupture (HCC)  Silvino Díaz is a 67 y/o F HX obacco HTN, COPD, Hyponatremia, involuntary movement disorder, DDD and osteoporosis  She was incidentally found to have AAA on CT imaging study performed for other purpose  CT abdomen pelvis w contrast 6/6/18:  -Partially thrombosed AAA, 4 9 cm in diameter  This measured 2 5 cm in 2012  MRI Lumbar spine w/o contrast 6/28/18:  -Extensive atherosclerotic change of the abdominal aorta with a saccular aneurysm along the left lateral aspect of the distal aorta similar to CT scan dated 6/6/2018  AAA was noted as 4 1 cm  The imaging studies above were reviewed personally by me with Dr Estrella Lombardi  The infrarenal AAA appears large  It is has a very lobular component and the aorta itself is quite angulated  This study is technically limited for the purpose of AAA evaluation and inadequate identify structures and measure the AAA  Exam is difficult as she was unable to get onto the table and examined in chair, as well as underlying movement disorder  Non-tender Ao  2+ Fem pulses and 2+ DP  Feet warm; no wounds  Good DP/PT signals  + venous varicosities  Plan: We had a detailed discussion regarding management and treatment of AAA  We discussed risk factors and the need for routine surveillance  Pt education was provided  We will see her back after CTA of abd      -Recommend dedicated CT Angiogram of the abdominal aorta which we will schedule at next available    -Smoking is associated with enlarging AAA  Smoking cessation is strongly suggested due to AAA, as well as for your overall health  -OMT for any co-morbidities: HTN, HLD and other risk factors  Continue aspirin 81 MG  There is some data for ACE inhibitors or losartan (ABR) which may decrease the rate of AAA expansion  We will defer ACE/ARB to PCP     -You may engage in moderate physical activity as tolerated which will help your overall cardiovascular health and fitness   -Consider screening of first-degree family members for AAA  -Routine surveillance by imaging studies per protocol     -Symptoms of rupture, such as severe back / abdominal pain were discussed with patient   -Follow up after CTA  Tobacco abuse  -smoking cessation discussed; not ready to quit per          Subjective: "I am here for my results     Patient ID: Nenita Lozada is a 66 y o  female  Argelia Seals (pronounced EVELYN- ) Teddy was referred by Dr Danna Huerta (PCP)  Patient had CTA on 6/6/18  Patient has Low back pain from a fall  She has ABD pain but has been constipated  Patient has HX of HTN  She takes ASA 81  She smokes less than a pack of cigarettes daily  HPI   Isaura Castaneda is a 65 y/o F HX obacco HTN, COPD, Hyponatremia, involuntary movement disorder, DDD and osteoporosis  She was incidentally found to have AAA on CT imaging study performed for other purpose  Argelia Seals is a smoker with hypertension  No known hypercholesterolemia  No known history of heart attack or stroke  No family hx of aneurysm  Argelia Seals has multiple medical problems  She comes in using a wheelchair  However, she reports that at times she does walk using a walker  She complains of severe constipation  She is on multiple medications for chronic back pain with degenerative disc disease  No known cardiac history  No chest pain or increased shortness of breath  She does have some COPD  We had a long discussion regarding symptoms of AAA  There were instructed that should she have any change in her chronic abdominal pain and back pain that could be concerning for AAA rupture, they should call 911 immediately  We reviewed Carolynn's history as well as her imaging studies  We discussed that she does have a large AAA  However, the studies were too limited to exactly measure the AAA  We discussed the rationale for additional testing and plan for continued surveillance     We will see them back after CTA with further recommendations  Dr Joanie Aguilar also reviewed his impression of imaging studies with the patient and her   The following portions of the patient's history were reviewed and updated as appropriate: allergies, current medications, past family history, past medical history, past social history, past surgical history and problem list      Severe constipation 5 days which is recurrent  Chronic back pain, severe with DDD  Movement disorder which limits her functional status  The he she does tell me that she uses a walker times  No known history of cardiac disease  No history of heart attack stroke TIA  No known history of hypercholesterolemia  She takes baby aspirin every other day  Review of Systems   Constitutional: Negative  HENT: Negative  Eyes: Negative  Respiratory: Negative  Cardiovascular: Negative  Gastrointestinal: Positive for abdominal pain and constipation  Endocrine: Negative  Genitourinary: Negative  Musculoskeletal: Positive for back pain  Skin: Negative  Allergic/Immunologic: Negative  Neurological: Negative  Hematological: Negative  Psychiatric/Behavioral: Negative  Objective:    /64 (BP Location: Right arm, Patient Position: Sitting)   Pulse 68   Temp 97 9 °F (36 6 °C)   Resp 18   Ht 5' (1 524 m)   Wt 40 4 kg (89 lb)   BMI 17 38 kg/m²      Exam is difficult as she was unable to get onto the table and examined in chair, as well as underlying movement disorder  Non-tender Ao  2+ Fem pulses and 2+ DP  Feet warm; no wounds  Good DP/PT signals  + few venous varicosities  Physical Exam   Constitutional: She is oriented to person, place, and time  She appears well-developed and well-nourished  She is cooperative  Movement disorder with  Marked involuntary head and extremity movement   HENT:   Head: Normocephalic and atraumatic  Eyes: Pupils are equal, round, and reactive to light   EOM are normal  Neck: Trachea normal  Neck supple  No JVD present  No thyromegaly present  Cardiovascular: Normal rate, regular rhythm, S1 normal, S2 normal and normal heart sounds  Exam reveals no gallop and no friction rub  No murmur heard  Pulses:       Carotid pulses are 2+ on the right side, and 2+ on the left side  Radial pulses are 2+ on the right side, and 2+ on the left side  Femoral pulses are 2+ on the right side, and 2+ on the left side  Dorsalis pedis pulses are 2+ on the right side, and 2+ on the left side  limited exam is she was in a chair and unable to get up onto the table  femoral pulses were palpated and seemed normal       No popliteal aneurysms appreciated  One to 2+ pulses in feet  which are warm and well perfused  There are no wounds  Pulmonary/Chest: Effort normal and breath sounds normal  No accessory muscle usage  No respiratory distress  Slightly decreased breath sounds   Abdominal: Soft  Bowel sounds are normal  She exhibits no distension  There is no hepatosplenomegaly  There is no tenderness  Musculoskeletal: Normal range of motion  She exhibits no edema or deformity  Neurological: She is alert and oriented to person, place, and time  Grossly normal    Skin: Skin is warm and dry  No lesion and no rash noted  No cyanosis  Nails show no clubbing  Psychiatric: She has a normal mood and affect  Nursing note and vitals reviewed            Vitals:    10/22/18 1434   BP: 118/64   BP Location: Right arm   Patient Position: Sitting   Pulse: 68   Resp: 18   Temp: 97 9 °F (36 6 °C)   Weight: 40 4 kg (89 lb)   Height: 5' (1 524 m)       Patient Active Problem List   Diagnosis    Abdominal pain    Abnormal involuntary movements    Aortic aneurysm (HCC)    Cataract, bilateral    Chronic pain disorder    Closed wedge fracture of thoracic vertebra with delayed healing    COPD, mild (HCC)    Generalized weakness    HTN (hypertension)    Hyponatremia  Irritable bowel syndrome without diarrhea    Osteoporosis    Thoracic radiculopathy    Closed fracture of third lumbar vertebra with routine healing    Long-term current use of opiate analgesic    Uncomplicated opioid dependence (HCC)    Movement disorder    Abdominal aortic aneurysm (AAA) without rupture (HCC)    Tobacco abuse       Past Surgical History:   Procedure Laterality Date    APPENDECTOMY      HYSTERECTOMY         Family History   Problem Relation Age of Onset    Cirrhosis Mother         hepatic    Bone cancer Father        Social History     Social History    Marital status: /Civil Union     Spouse name: N/A    Number of children: N/A    Years of education: N/A     Occupational History    Not on file       Social History Main Topics    Smoking status: Current Every Day Smoker    Smokeless tobacco: Never Used    Alcohol use Yes      Comment: conflicting both occasionally an no use per [employer]    Drug use: No    Sexual activity: Not on file     Other Topics Concern    Not on file     Social History Narrative    Always uses seatbelt           No Known Allergies      Current Outpatient Prescriptions:     aspirin 81 MG tablet, Take by mouth, Disp: , Rfl:     bifidobacterium infantis (ALIGN) capsule, Take by mouth, Disp: , Rfl:     Calcium Carb-Cholecalciferol (CALCIUM 600 + D) 600-200 MG-UNIT TABS, Take by mouth, Disp: , Rfl:     clonazePAM (KlonoPIN) 0 5 mg tablet, Take 1/2 to 1 po bid prn, Disp: 60 tablet, Rfl: 0    cyclobenzaprine (FLEXERIL) 10 mg tablet, Take 1 tablet (10 mg total) by mouth daily at bedtime for 30 days, Disp: 30 tablet, Rfl: 1    [START ON 11/4/2018] fentaNYL (DURAGESIC) 25 mcg/hr, Earliest Fill Date: 11/4/18 Place 1 patch q72hrs, Disp: 10 patch, Rfl: 0    gabapentin (NEURONTIN) 300 mg capsule, TAKE 1 CAPSULE THREE TIMES A DAY, Disp: 90 capsule, Rfl: 2    Ibuprofen-Diphenhydramine Cit (ADVIL PM) 200-38 MG TABS, Take 2 tablets by mouth, Disp: , Rfl:     lidocaine (LIDODERM) 5 %, Place 1 patch on the skin daily Remove & Discard patch within 12 hours or as directed by MD, Disp: 30 patch, Rfl: 0    Multiple Vitamins-Minerals (MULTIVITAMIN ADULT PO), Take by mouth, Disp: , Rfl:     multivitamin (THERAGRAN) TABS, Take 1 tablet by mouth daily, Disp: , Rfl:     [START ON 11/4/2018] oxyCODONE-acetaminophen (PERCOCET) 7 5-325 MG per tablet, Take 1 tablet by mouth every 8 (eight) hours as needed for moderate pain for up to 30 days Earliest Fill Date: 11/4/18 Max Daily Amount: 3 tablets, Disp: 90 tablet, Rfl: 0    Psyllium (METAMUCIL) 28 3 % POWD, Take by mouth, Disp: , Rfl:     traZODone (DESYREL) 50 mg tablet, TAKE 1/2 TO 1 TABLET BY MOUTH AT BEDTIME AS NEEDED, Disp: 90 tablet, Rfl: 0

## 2018-10-22 NOTE — PATIENT INSTRUCTIONS
AAA without rupture  Tobacco    Raul Lane is a smoker with hypertension  She was found to have AAA on imaging study performed for other purpose  No known hypercholesterolemia  No known history of heart attack or stroke  No family hx of aneurysm  The infrarenal AAA appears large  We will check a CTA to evaluate the AAA  Recommendations:  -Recommend dedicated CT Angiogram of the abdominal aorta which we will schedule at next available    -Smoking is associated with enlarging AAA  Smoking cessation is strongly suggested due to AAA, as well as for your overall health  -OMT for any co-morbidities: HTN, HLD and other risk factors  Continue aspirin 81 MG  There is some data for ACE inhibitors or losartan (ABR) which may decrease the rate of AAA expansion  We will defer ACE/ARB to PCP  -You may engage in moderate physical activity as tolerated which will help your overall cardiovascular health and fitness   -Consider screening of first-degree family members for AAA  -Routine surveillance by imaging studies per protocol     -Symptoms of rupture, such as severe back / abdominal pain were discussed with patient   -Follow up after CTA to further discuss  Cigarette Smoking and Your Health   AMBULATORY CARE:   Risks to your health if you smoke:  Nicotine and other chemicals found in tobacco damage every cell in your body  Even if you are a light smoker, you have an increased risk for cancer, heart disease, and lung disease  If you are pregnant or have diabetes, smoking increases your risk for complications  Benefits to your health if you stop smoking:   · You decrease respiratory symptoms such as coughing, wheezing, and shortness of breath  · You reduce your risk for cancers of the lung, mouth, throat, kidney, bladder, pancreas, stomach, and cervix  If you already have cancer, you increase the benefits of chemotherapy  You also reduce your risk for cancer returning or a second cancer from developing  · You reduce your risk for heart disease, blood clots, heart attack, and stroke  · You reduce your risk for lung infections, and diseases such as pneumonia, asthma, chronic bronchitis, and emphysema  · Your circulation improves  More oxygen can be delivered to your body  If you have diabetes, you lower your risk for complications, such as kidney, artery, and eye diseases  You also lower your risk for nerve damage  Nerve damage can lead to amputations, poor vision, and blindness  · You improve your body's ability to heal and to fight infections  Benefits to the health of others if you stop smoking:  Tobacco is harmful to nonsmokers who breathe in your secondhand smoke  The following are ways the health of others around you may improve when you stop smoking:  · You lower the risks for lung cancer and heart disease in nonsmoking adults  · If you are pregnant, you lower the risk for miscarriage, early delivery, low birth weight, and stillbirth  You also lower your baby's risk for SIDS, obesity, developmental delay, and neurobehavioral problems, such as ADHD  · If you have children, you lower their risk for ear infections, colds, pneumonia, bronchitis, and asthma  For more information and support to stop smoking:   · Smokefree  gov  Phone: 2- 706 - 565-3781  Web Address: www AFAR  Follow up with your healthcare provider as directed:  Write down your questions so you remember to ask them during your visits  © 2017 [employer] [employer] Information is for End User's use only and may not be sold, redistributed or otherwise used for commercial purposes  All illustrations and images included in CareNotes® are the copyrighted property of [employer] M [employer]  or Nicho Moreira  The above information is an  only  It is not intended as medical advice for individual conditions or treatments   Talk to your doctor, nurse or pharmacist before following any medical regimen to see if it is safe and effective for you

## 2018-10-22 NOTE — LETTER
October 22, 2018     [facility]  [address] [place]  [place] [address]    Patient: Steven Ortiz   YOB: 1940   Date of Visit: 10/22/2018     Dear Dr Vinayak Maravilla      Thank you for referring Madison Aguilera to me for evaluation  Below are the relevant portions of my assessment and plan of care  If you have questions, please do not hesitate to call me  I look forward to following Karla Farr along with you  Sincerely,        Vamshi Rob PA-C        CC: No Recipients    Progress Notes:      Abdominal aortic aneurysm (AAA) without rupture ([facility] [address] )  Madison Aguilera is a 67 y/o F HX obacco HTN, COPD, Hyponatremia, involuntary movement disorder, DDD and osteoporosis  She was incidentally found to have AAA on CT imaging study performed for other purpose  CT abdomen pelvis w contrast 6/6/18:  -Partially thrombosed AAA, 4 9 cm in diameter  This measured 2 5 cm in 2012  MRI Lumbar spine w/o contrast 6/28/18:  -Extensive atherosclerotic change of the abdominal aorta with a saccular aneurysm along the left lateral aspect of the distal aorta similar to CT scan dated 6/6/2018  AAA was noted as 4 1 cm  The imaging studies above were reviewed personally by me with Dr Jose James  The infrarenal AAA appears large  It is has a very lobular component and the aorta itself is quite angulated  This study is technically limited for the purpose of AAA evaluation and inadequate identify structures and measure the AAA  Exam is difficult as she was unable to get onto the table and examined in chair, as well as underlying movement disorder  Non-tender Ao  2+ Fem pulses and 2+ DP  Feet warm; no wounds  Good DP/PT signals  + venous varicosities  Plan: We had a detailed discussion regarding management and treatment of AAA  We discussed risk factors and the need for routine surveillance  Pt education was provided   We will see her back after CTA of abd      -Recommend dedicated CT Angiogram of the abdominal aorta which we will schedule at next available    -Smoking is associated with enlarging AAA  Smoking cessation is strongly suggested due to AAA, as well as for your overall health  -OMT for any co-morbidities: HTN, HLD and other risk factors  Continue aspirin 81 MG  There is some data for ACE inhibitors or losartan (ABR) which may decrease the rate of AAA expansion  We will defer ACE/ARB to PCP  -You may engage in moderate physical activity as tolerated which will help your overall cardiovascular health and fitness   -Consider screening of first-degree family members for AAA  -Routine surveillance by imaging studies per protocol     -Symptoms of rupture, such as severe back / abdominal pain were discussed with patient   -Follow up after CTA         Tobacco abuse  -smoking cessation discussed; not ready to quit per

## 2018-10-30 ENCOUNTER — TRANSCRIBE ORDERS (OUTPATIENT)
Dept: LAB | Facility: HOSPITAL | Age: 78
End: 2018-10-30

## 2018-10-30 DIAGNOSIS — I71.4 ABDOMINAL AORTIC ANEURYSM WITHOUT RUPTURE (HCC): Primary | ICD-10-CM

## 2018-10-31 ENCOUNTER — APPOINTMENT (OUTPATIENT)
Dept: LAB | Facility: HOSPITAL | Age: 78
End: 2018-10-31
Payer: MEDICARE

## 2018-10-31 LAB
ANION GAP SERPL CALCULATED.3IONS-SCNC: 4 MMOL/L (ref 4–13)
BUN SERPL-MCNC: 9 MG/DL (ref 5–25)
CALCIUM SERPL-MCNC: 9.1 MG/DL (ref 8.3–10.1)
CHLORIDE SERPL-SCNC: 98 MMOL/L (ref 100–108)
CO2 SERPL-SCNC: 32 MMOL/L (ref 21–32)
CREAT SERPL-MCNC: 0.74 MG/DL (ref 0.6–1.3)
GFR SERPL CREATININE-BSD FRML MDRD: 78 ML/MIN/1.73SQ M
GLUCOSE SERPL-MCNC: 82 MG/DL (ref 65–140)
POTASSIUM SERPL-SCNC: 4.1 MMOL/L (ref 3.5–5.3)
SODIUM SERPL-SCNC: 134 MMOL/L (ref 136–145)
VENIPUNCTURE: NORMAL

## 2018-10-31 PROCEDURE — 80048 BASIC METABOLIC PNL TOTAL CA: CPT

## 2018-10-31 PROCEDURE — 36415 COLL VENOUS BLD VENIPUNCTURE: CPT

## 2018-11-13 ENCOUNTER — HOSPITAL ENCOUNTER (OUTPATIENT)
Dept: CT IMAGING | Facility: HOSPITAL | Age: 78
Discharge: HOME | End: 2018-11-13
Payer: MEDICARE

## 2018-11-13 DIAGNOSIS — F41.9 ANXIETY: ICD-10-CM

## 2018-11-13 DIAGNOSIS — I71.4 ABDOMINAL AORTIC ANEURYSM (AAA) WITHOUT RUPTURE (HCC): ICD-10-CM

## 2018-11-13 PROCEDURE — 74175 CTA ABDOMEN W/CONTRAST: CPT

## 2018-11-13 RX ORDER — CLONAZEPAM 0.5 MG/1
TABLET ORAL
Qty: 60 TABLET | Refills: 0 | Status: SHIPPED | OUTPATIENT
Start: 2018-11-13 | End: 2018-12-11 | Stop reason: SDUPTHER

## 2018-11-13 RX ADMIN — IOHEXOL 80 ML: 350 INJECTION, SOLUTION INTRAVENOUS at 13:58

## 2018-11-20 ENCOUNTER — OFFICE VISIT (OUTPATIENT)
Dept: VASCULAR SURGERY | Facility: CLINIC | Age: 78
End: 2018-11-20
Payer: MEDICARE

## 2018-11-20 ENCOUNTER — TELEPHONE (OUTPATIENT)
Dept: ADMINISTRATIVE | Facility: HOSPITAL | Age: 78
End: 2018-11-20

## 2018-11-20 VITALS — DIASTOLIC BLOOD PRESSURE: 110 MMHG | SYSTOLIC BLOOD PRESSURE: 140 MMHG | HEART RATE: 72 BPM

## 2018-11-20 DIAGNOSIS — I71.4 ABDOMINAL AORTIC ANEURYSM (AAA) WITHOUT RUPTURE (HCC): Primary | ICD-10-CM

## 2018-11-20 PROCEDURE — 99214 OFFICE O/P EST MOD 30 MIN: CPT | Performed by: SURGERY

## 2018-11-20 NOTE — LETTER
November 22, 2018     Moe Ching, [address] [facility] [address]    Patient: Steven Ortiz   YOB: 1940   Date of Visit: 11/20/2018       Dear Dr Vasquez Fore:    Thank you for referring Madison Aguilera to me for evaluation  Below are my notes for this consultation  If you have questions, please do not hesitate to call me  I look forward to following your patient along with you  Sincerely,        Marcela Shaw MD        CC: MD Kenn Acosta MD Bobie Aye, MD  11/22/2018 11:31 AM  Sign at close encounter  Assessment/Plan:    Abdominal aortic aneurysm (AAA) without rupture [facility])  66-year-old lady with several medical issues including COPD, involuntary movement disorder, severe degenerative spine disorder which has made her wheelchair bound and osteomyelitis who was diagnosed to have an infrarenal abdominal aortic aneurysm  She is asymptomatic from this  The aneurysm has a low filleted appearance and is irregular and it measures about 5 9 cm in maximum transverse diameter at the low be related portion  This has not changed since the CT scan that was performed in June  There are several technically challenging issues in endovascular repair of this aneurysm which include small caliber iliac artery access, heavily angulated neck, large lower left renal artery, 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 and presence of patent inferior mesenteric artery  She is very frail and is not a candidate for open surgical repair  One approach would be revascularized the SMA prior to coverage of the NADIYA during the endovascular aortic aneurysm repair  More than likely she will end up losing the left lower pole kidney artery which would end up losing up to 2/3 of the left kidney  I will discuss this with other vascular surgeons in our group in our vascular case conference prior to coming up with final recommendations      All this was discussed with the patient and her  in detail today  I also muna pictures to help them on distended  Diagnoses and all orders for this visit:    Abdominal aortic aneurysm (AAA) without rupture (HCC)          Subjective:      Patient ID: Diallo Felix is a 66 y o  female  Pt had CTA abd 11/13  Pt has known AAA  Pt has intermittent abdominal pain and back pain  Patient has a known abdominal aortic aneurysm that was diagnosed in 2012 and has slowly increased in size  Recently patient had a CT angiogram prior to coming to see us in the office to discuss repair  She has long-term abdominal pain due to constipation  She also has long-term back pain secondary to severe degenerative spine disease for which she has had multiple surgical procedures as well  She is mainly wheelchair bound now secondary to spine problems  She also has a lot of involuntary movements for which she is on different medications  The following portions of the patient's history were reviewed and updated as appropriate: allergies, current medications, past family history, past medical history, past social history, past surgical history and problem list     Review of Systems   Constitutional: Negative  HENT: Negative  Eyes: Negative  Respiratory: Negative  Cardiovascular: Negative  Gastrointestinal: Positive for abdominal pain and constipation  Endocrine: Negative  Genitourinary: Negative  Musculoskeletal: Positive for back pain and gait problem  Skin: Negative  Allergic/Immunologic: Negative  Hematological: Negative  Psychiatric/Behavioral: Negative  I have reviewed the review of systems as entered and made appropriate changes as necessary    Objective:      BP (!) 140/110 (BP Location: Left arm, Patient Position: Sitting, Cuff Size: Adult)   Pulse 72          Physical Exam   Constitutional: She is oriented to person, place, and time  She appears well-developed and well-nourished     HENT: Head: Normocephalic and atraumatic  Cardiovascular: Normal rate and regular rhythm  Pulmonary/Chest: Effort normal  No respiratory distress  She has no wheezes  Abdominal: Soft  Musculoskeletal: She exhibits no edema  Neurological: She is alert and oriented to person, place, and time  Coordination abnormal    Abnormal involuntary movements   Skin: Skin is warm and dry  Psychiatric: She has a normal mood and affect  Nursing note and vitals reviewed

## 2018-11-20 NOTE — TELEPHONE ENCOUNTER
Called pt to schedule ASAP appointment due to cta results per Georgie  Patient stated she is too weak to come in  She put her  on the phone I explained to him the importance of being seen and he stated he would talk to his wife and call back to schedule

## 2018-11-20 NOTE — PROGRESS NOTES
Assessment/Plan:    Abdominal aortic aneurysm (AAA) without rupture [facility])  68-year-old lady with several medical issues including COPD, involuntary movement disorder, severe degenerative spine disorder which has made her wheelchair bound and osteomyelitis who was diagnosed to have an infrarenal abdominal aortic aneurysm  She is asymptomatic from this  The aneurysm has a low filleted appearance and is irregular and it measures about 5 9 cm in maximum transverse diameter at the low be related portion  This has not changed since the CT scan that was performed in June  There are several technically challenging issues in endovascular repair of this aneurysm which include small caliber iliac artery access, heavily angulated neck, large lower left renal artery, 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 and presence of patent inferior mesenteric artery  She is very frail and is not a candidate for open surgical repair  One approach would be revascularized the SMA prior to coverage of the NADIYA during the endovascular aortic aneurysm repair  More than likely she will end up losing the left lower pole kidney artery which would end up losing up to 2/3 of the left kidney  I will discuss this with other vascular surgeons in our group in our vascular case conference prior to coming up with final recommendations  All this was discussed with the patient and her  in detail today  I also muna pictures to help them on distended  Diagnoses and all orders for this visit:    Abdominal aortic aneurysm (AAA) without rupture (HCC)          Subjective:      Patient ID: Patricia Jara is a 66 y o  female  Pt had CTA abd 11/13  Pt has known AAA  Pt has intermittent abdominal pain and back pain  Patient has a known abdominal aortic aneurysm that was diagnosed in 2012 and has slowly increased in size  Recently patient had a CT angiogram prior to coming to see us in the office to discuss repair    She has long-term abdominal pain due to constipation  She also has long-term back pain secondary to severe degenerative spine disease for which she has had multiple surgical procedures as well  She is mainly wheelchair bound now secondary to spine problems  She also has a lot of involuntary movements for which she is on different medications  The following portions of the patient's history were reviewed and updated as appropriate: allergies, current medications, past family history, past medical history, past social history, past surgical history and problem list     Review of Systems   Constitutional: Negative  HENT: Negative  Eyes: Negative  Respiratory: Negative  Cardiovascular: Negative  Gastrointestinal: Positive for abdominal pain and constipation  Endocrine: Negative  Genitourinary: Negative  Musculoskeletal: Positive for back pain and gait problem  Skin: Negative  Allergic/Immunologic: Negative  Hematological: Negative  Psychiatric/Behavioral: Negative  I have reviewed the review of systems as entered and made appropriate changes as necessary    Objective:      BP (!) 140/110 (BP Location: Left arm, Patient Position: Sitting, Cuff Size: Adult)   Pulse 72          Physical Exam   Constitutional: She is oriented to person, place, and time  She appears well-developed and well-nourished  HENT:   Head: Normocephalic and atraumatic  Cardiovascular: Normal rate and regular rhythm  Pulmonary/Chest: Effort normal  No respiratory distress  She has no wheezes  Abdominal: Soft  Musculoskeletal: She exhibits no edema  Neurological: She is alert and oriented to person, place, and time  Coordination abnormal    Abnormal involuntary movements   Skin: Skin is warm and dry  Psychiatric: She has a normal mood and affect  Nursing note and vitals reviewed

## 2018-11-22 NOTE — ASSESSMENT & PLAN NOTE
75-year-old lady with several medical issues including COPD, involuntary movement disorder, severe degenerative spine disorder which has made her wheelchair bound and osteomyelitis who was diagnosed to have an infrarenal abdominal aortic aneurysm  She is asymptomatic from this  The aneurysm has a low filleted appearance and is irregular and it measures about 5 9 cm in maximum transverse diameter at the low be related portion  This has not changed since the CT scan that was performed in June  There are several technically challenging issues in endovascular repair of this aneurysm which include small caliber iliac artery access, heavily angulated neck, large lower left renal artery, 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 and presence of patent inferior mesenteric artery  She is very frail and is not a candidate for open surgical repair  One approach would be revascularized the SMA prior to coverage of the NADIYA during the endovascular aortic aneurysm repair  More than likely she will end up losing the left lower pole kidney artery which would end up losing up to 2/3 of the left kidney  I will discuss this with other vascular surgeons in our group in our vascular case conference prior to coming up with final recommendations  All this was discussed with the patient and her  in detail today  I also muna pictures to help them on distended

## 2018-11-26 ENCOUNTER — OFFICE VISIT (OUTPATIENT)
Dept: PAIN MEDICINE | Facility: CLINIC | Age: 78
End: 2018-11-26
Payer: MEDICARE

## 2018-11-26 VITALS
RESPIRATION RATE: 14 BRPM | DIASTOLIC BLOOD PRESSURE: 101 MMHG | SYSTOLIC BLOOD PRESSURE: 135 MMHG | BODY MASS INDEX: 17.38 KG/M2 | HEIGHT: 60 IN | HEART RATE: 105 BPM

## 2018-11-26 DIAGNOSIS — S22.000G CLOSED WEDGE FRACTURE OF THORACIC VERTEBRA WITH DELAYED HEALING, UNSPECIFIED THORACIC VERTEBRAL LEVEL, SUBSEQUENT ENCOUNTER: ICD-10-CM

## 2018-11-26 DIAGNOSIS — Z79.891 LONG-TERM CURRENT USE OF OPIATE ANALGESIC: ICD-10-CM

## 2018-11-26 DIAGNOSIS — M54.14 THORACIC RADICULOPATHY: ICD-10-CM

## 2018-11-26 DIAGNOSIS — G89.4 CHRONIC PAIN DISORDER: ICD-10-CM

## 2018-11-26 DIAGNOSIS — G89.4 CHRONIC PAIN SYNDROME: ICD-10-CM

## 2018-11-26 PROCEDURE — 99214 OFFICE O/P EST MOD 30 MIN: CPT | Performed by: ANESTHESIOLOGY

## 2018-11-26 PROCEDURE — 80305 DRUG TEST PRSMV DIR OPT OBS: CPT | Performed by: ANESTHESIOLOGY

## 2018-11-26 RX ORDER — CYCLOBENZAPRINE HCL 10 MG
10 TABLET ORAL
Qty: 30 TABLET | Refills: 1 | Status: SHIPPED | OUTPATIENT
Start: 2018-11-26 | End: 2019-01-23 | Stop reason: SDUPTHER

## 2018-11-26 RX ORDER — FENTANYL 50 UG/H
1 PATCH TRANSDERMAL
Qty: 10 PATCH | Refills: 0 | Status: SHIPPED | OUTPATIENT
Start: 2018-12-04 | End: 2018-12-13

## 2018-11-26 RX ORDER — OXYCODONE AND ACETAMINOPHEN 7.5; 325 MG/1; MG/1
1 TABLET ORAL 2 TIMES DAILY
Qty: 60 TABLET | Refills: 0 | Status: SHIPPED | OUTPATIENT
Start: 2018-12-04 | End: 2018-12-11 | Stop reason: SDUPTHER

## 2018-11-26 RX ORDER — GABAPENTIN 300 MG/1
300 CAPSULE ORAL 3 TIMES DAILY
Qty: 90 CAPSULE | Refills: 0 | Status: SHIPPED | OUTPATIENT
Start: 2018-11-26 | End: 2019-04-12

## 2018-11-26 NOTE — PROGRESS NOTES
Assessment:  1  Chronic pain syndrome  fentaNYL (DURAGESIC) 50 mcg/hr    oxyCODONE-acetaminophen (PERCOCET) 7 5-325 MG per tablet   2  Chronic pain disorder  gabapentin (NEURONTIN) 300 mg capsule    cyclobenzaprine (FLEXERIL) 10 mg tablet   3  Thoracic radiculopathy  gabapentin (NEURONTIN) 300 mg capsule    cyclobenzaprine (FLEXERIL) 10 mg tablet   4  Closed wedge fracture of thoracic vertebra with delayed healing, unspecified thoracic vertebral level, subsequent encounter  gabapentin (NEURONTIN) 300 mg capsule    cyclobenzaprine (FLEXERIL) 10 mg tablet         Plan:  The patient is a 77-year-old female with a history of chronic pain syndrome secondary to thoracic radiculopathy, stemming from multiple thoracic and lumbar compression fractures causing radicular symptoms   Patient is on fentanyl patch 25 mcg Q 72 hours and Oxycodone acetaminophen 7 5/325 mg q8hrs prn   In addition, patient takes gabapentin 300 mg p o  T i d  And Flexeril 10 mg p o  QHS  Patient denies aberrant behavior   Patient reports inadequate pain relief   Patient continues to perform ADLs without difficulty   Patient reports constipation and is on miralax which helps      Today, patient was given a prescription refill for oxycodone acetaminophen 7 5/325 mg p o  Cut back to B i d  P r n  Pain and also a prescription for fentanyl patch increased to 50 mcg Q 72 hours   Prescriptions were dated to be filled today December 4th, 2018   I will see her back in 4 weeks for reassessment and medication refill  Refills for flexeril and gabapentin was sent to her pharmacy  There are risks associated with opioid medications, including dependence, addiction and tolerance  The patient understands and agrees to use these medications only as prescribed  Potential side effects of the medications include, but are not limited to, constipation, drowsiness, addiction, impaired judgment and risk of fatal overdose if not taken as prescribed   The patient was warned against driving while taking sedation medications  Sharing medications is a felony  At this point in time, the patient is showing no signs of addiction, abuse, diversion or suicidal ideation  A urine drug screen was collected at today's office visit as part of our medication management protocol  The point of care testing results were appropriate for what was being prescribed  The specimen will be sent for confirmatory testing  The drug screen is medically necessary because the patient is either dependent on opioid medication or is being considered for opioid medication therapy and the results could impact ongoing or future treatment  The drug screen is to evaluate for the presences or absence of prescribed, non-prescribed, and/or illicit drugs/substances  [place] Prescription Drug Monitoring Program report was reviewed and was appropriate     My impressions and treatment recommendations were discussed in detail with the patient who verbalized understanding and had no further questions  Discharge instructions were provided  I personally saw and examined the patient and I agree with the above discussed plan of care  History of Present Illness:  Carmela Boone is a 66 y o  female who presents for a follow up office visit in regards to Back Pain  The patients current symptoms include a constant, sharp pain across the low back  The patient is currently on fentanyl patch 25 mcg Q 72 hours and oxycodone acetaminophen 7 5/325 mg p o  T i d  P r n  For breakthrough pain  Patient reports 50% pain relief  Patient is currently on MiraLax for constipation with some relief  Patient reports 9/10 pain  Patient denies aberrant behavior  Patient reports having more pain  Patient continues to perform ADLs without difficulty  Patient reports occasional constipation  Pain Contract Signed: 2/6/18, Last Urine Drug Screen: 6/4/18  Of note she has a 5 9 cm aneurysm   As per her  she follow up with the vascular surgeon  I am concerned regarding her HR in the setting of an enlarged anurysm  Also her diastolic BP is greater than 100  I will benefit from better BP control and slowly titrated Beta blocker perhaps  She will follow up with her vascular surgeon  I have personally reviewed and/or updated the patient's past medical history, past surgical history, family history, social history, current medications, allergies, and vital signs today  Review of Systems   Respiratory: Negative for shortness of breath  Cardiovascular: Negative for chest pain  Gastrointestinal: Negative for constipation, diarrhea, nausea and vomiting  Musculoskeletal: Negative for arthralgias, gait problem, joint swelling and myalgias  Skin: Negative for rash  Neurological: Negative for dizziness, seizures and weakness  All other systems reviewed and are negative        Patient Active Problem List   Diagnosis    Abdominal pain    Abnormal involuntary movements    Cataract, bilateral    Chronic pain disorder    Closed wedge fracture of thoracic vertebra with delayed healing    COPD, mild (HCC)    Generalized weakness    HTN (hypertension)    Hyponatremia    Irritable bowel syndrome without diarrhea    Osteoporosis    Thoracic radiculopathy    Closed fracture of third lumbar vertebra with routine healing    Long-term current use of opiate analgesic    Uncomplicated opioid dependence ([address] )    Movement disorder    Abdominal aortic aneurysm (AAA) without rupture ([address] )    Tobacco abuse       Past Medical History:   Diagnosis Date    Abdominal aortic aneurysm (AAA) 3 0 cm to 5 0 cm in diameter in female ([address] ) 06/08/2018    4 9 cm     Anxiety     Chronic obstructive lung disease (HCC)     Compression fracture of thoracic vertebra (HCC)     last assessed 2/20/17    Depression     Dysthymia     Failure to thrive in adult     Fracture of lumbar vertebra (HCC)     Hypertension     benign essential    Hyponatremia        Past Surgical History:   Procedure Laterality Date    APPENDECTOMY      HYSTERECTOMY         Family History   Problem Relation Age of Onset    Cirrhosis Mother         hepatic    Bone cancer Father        Social History     Occupational History    Not on file  Social History Main Topics    Smoking status: Current Every Day Smoker    Smokeless tobacco: Never Used    Alcohol use Yes      Comment: conflicting both occasionally an no use per [employer]    Drug use: No    Sexual activity: Not on file       Current Outpatient Prescriptions on File Prior to Visit   Medication Sig    aspirin 81 MG tablet Take by mouth    bifidobacterium infantis (ALIGN) capsule Take by mouth    Calcium Carb-Cholecalciferol (CALCIUM 600 + D) 600-200 MG-UNIT TABS Take by mouth    clonazePAM (KlonoPIN) 0 5 mg tablet Take 1/2 to 1 po bid prn    fentaNYL (DURAGESIC) 25 mcg/hr Earliest Fill Date: 11/4/18 Place 1 patch q72hrs    gabapentin (NEURONTIN) 300 mg capsule TAKE 1 CAPSULE THREE TIMES A DAY    Ibuprofen-Diphenhydramine Cit (ADVIL PM) 200-38 MG TABS Take 2 tablets by mouth    lidocaine (LIDODERM) 5 % Place 1 patch on the skin daily Remove & Discard patch within 12 hours or as directed by MD    Multiple Vitamins-Minerals (MULTIVITAMIN ADULT PO) Take by mouth    multivitamin (THERAGRAN) TABS Take 1 tablet by mouth daily    oxyCODONE-acetaminophen (PERCOCET) 7 5-325 MG per tablet Take 1 tablet by mouth every 8 (eight) hours as needed for moderate pain for up to 30 days Earliest Fill Date: 11/4/18 Max Daily Amount: 3 tablets    Psyllium (METAMUCIL) 28 3 % POWD Take by mouth    traZODone (DESYREL) 50 mg tablet TAKE 1/2 TO 1 TABLET BY MOUTH AT BEDTIME AS NEEDED    cyclobenzaprine (FLEXERIL) 10 mg tablet Take 1 tablet (10 mg total) by mouth daily at bedtime for 30 days     No current facility-administered medications on file prior to visit          No Known Allergies    Physical Exam:    BP (!) 135/101   Pulse 105   Resp 14   Ht 5' (1 524 m)   BMI 17 38 kg/m²     Constitutional:normal, well developed, well nourished, alert, in no distress and non-toxic and no overt pain behavior    Eyes:anicteric  HEENT:grossly intact  Neck:supple, symmetric, trachea midline and no masses   Pulmonary:even and unlabored  Cardiovascular:No edema or pitting edema present  Skin:Normal without rashes or lesions and well hydrated  Psychiatric:Mood and affect appropriate  Neurologic:Cranial Nerves II-XII grossly intact  Musculoskeletal: involuntary movements    Imaging

## 2018-12-10 DIAGNOSIS — G47.00 INSOMNIA, UNSPECIFIED TYPE: ICD-10-CM

## 2018-12-10 RX ORDER — TRAZODONE HYDROCHLORIDE 50 MG/1
50 TABLET ORAL
Qty: 30 TABLET | Refills: 3 | Status: SHIPPED | OUTPATIENT
Start: 2018-12-10 | End: 2019-07-28 | Stop reason: SDUPTHER

## 2018-12-10 RX ORDER — TRAZODONE HYDROCHLORIDE 50 MG/1
25-50 TABLET ORAL
Qty: 90 TABLET | Refills: 1 | Status: SHIPPED | OUTPATIENT
Start: 2018-12-10 | End: 2019-01-22

## 2018-12-11 ENCOUNTER — TELEPHONE (OUTPATIENT)
Dept: PAIN MEDICINE | Facility: CLINIC | Age: 78
End: 2018-12-11

## 2018-12-11 DIAGNOSIS — G89.4 CHRONIC PAIN SYNDROME: ICD-10-CM

## 2018-12-11 DIAGNOSIS — F41.9 ANXIETY: ICD-10-CM

## 2018-12-11 RX ORDER — OXYCODONE AND ACETAMINOPHEN 7.5; 325 MG/1; MG/1
1 TABLET ORAL DAILY
Qty: 30 TABLET | Refills: 0 | Status: SHIPPED | OUTPATIENT
Start: 2018-12-11 | End: 2018-12-13 | Stop reason: SDUPTHER

## 2018-12-11 RX ORDER — FENTANYL 25 UG/H
PATCH TRANSDERMAL
Qty: 10 PATCH | Refills: 0 | Status: SHIPPED | OUTPATIENT
Start: 2018-12-11 | End: 2019-02-12 | Stop reason: SDUPTHER

## 2018-12-11 RX ORDER — CLONAZEPAM 0.5 MG/1
TABLET ORAL
Qty: 60 TABLET | Refills: 0 | Status: SHIPPED | OUTPATIENT
Start: 2018-12-11 | End: 2019-01-10 | Stop reason: SDUPTHER

## 2018-12-11 NOTE — TELEPHONE ENCOUNTER
Patients  stopped in questioning if he can cut the Fentanyl Patch in half? He states current dosage is too strong for the patient  She is getting the shakes real bad and fatigue  Also if she is going to be taking half the dose for Fentanyl, can she increase her Oxycodone? If so, she will need a refill on that script    Pls Advise

## 2018-12-11 NOTE — TELEPHONE ENCOUNTER
See below  S/W  who states Marianna Weller has applied new increased Fentanyl patch twice since OV  States both times, she became shaky and fatigued   states she has been taking Percocet as ordered, once in am and once in pm   Pt would like decreased Fentanyl and increased frequency of percocet  Advised  that Fentanyl patch should not be cut in half and to wait until SI addresses concerns  Please advise

## 2018-12-11 NOTE — TELEPHONE ENCOUNTER
I called patient's  and informed him that patient should not cut the fentanyl patch  I will send in the prescription for Anaprox refer mcg Q 72 hr and also send a 30 day supply of oxycodone-acetaminophen 7 5/325 mg p o  daily to add onto the previous dose of oxycodone (#60 tabs) to complete a total of 90 for the month -taken TID  Prescriptions were sent electronically to the pharmacy

## 2018-12-11 NOTE — TELEPHONE ENCOUNTER
Please let Yany Lovett know that although I ordered her clonazepam for her we were advised by the state there are too many narcotics and benzo's on board  Will need to decrease clonazepam next month and then will need to discontinue

## 2018-12-13 ENCOUNTER — TELEPHONE (OUTPATIENT)
Dept: PAIN MEDICINE | Facility: CLINIC | Age: 78
End: 2018-12-13

## 2018-12-13 DIAGNOSIS — G89.4 CHRONIC PAIN SYNDROME: ICD-10-CM

## 2018-12-13 RX ORDER — OXYCODONE AND ACETAMINOPHEN 7.5; 325 MG/1; MG/1
1 TABLET ORAL EVERY 8 HOURS PRN
Qty: 90 TABLET | Refills: 0 | Status: SHIPPED | OUTPATIENT
Start: 2018-12-24 | End: 2019-01-02 | Stop reason: SDUPTHER

## 2018-12-13 NOTE — TELEPHONE ENCOUNTER
Patient's  stopped in  He has some concerns about the Oxycodone script  He did picked up 12/4 script  His concern is if she increases the current script to TID she will run out of the script early and the pharmacy will not fill the new script until 12/31  She is not out of pills but he is trying to get this figured out before she does run out    Please advise

## 2018-12-13 NOTE — TELEPHONE ENCOUNTER
I spoke with the pharmacist and I was advised that the patient can continue her current Percocet BID dosing  I will send in a prescription for Percocet 7 5/325 mg p o  t i d  to be filled on December 24, 2018 as that is when she will run out of her medication with the dose change  Please inform patient   Prescription sent electronically

## 2018-12-13 NOTE — TELEPHONE ENCOUNTER
See below  Pharmacy will not fill new once a day Rx of Oxycodone until 12/31  Need to clarify that pt is increasing from BID to TID on new Rx? Please advise

## 2018-12-31 ENCOUNTER — TELEPHONE (OUTPATIENT)
Dept: FAMILY MEDICINE CLINIC | Facility: CLINIC | Age: 78
End: 2018-12-31

## 2018-12-31 NOTE — TELEPHONE ENCOUNTER
Spoke with daughter who is caring for her mom while her dad is admitted to [facility]  Reviewed med list and med reconciliation done, but still with questions re pain meds  Advised to call   Dr Elizabeth Harrison office for further info re pain meds

## 2018-12-31 NOTE — TELEPHONE ENCOUNTER
S/w pharmacy  Pharmacist states they dispensed 56 tabs, instead of 90  Pt will need new script for remainder and would like it sent to pharmacy listed in task

## 2018-12-31 NOTE — TELEPHONE ENCOUNTER
S/w patients daughter Tayler Kyle called about the oxyCODONE-acetaminophen (PERCOCET) 7 5-325 MG tablet  When Tayler Kyle picked up the RX on 12/24/18 for [facility] the pharmacy only had 30 tablets  They told patient that they would not have any more until after the new year  Patient will need a new RX sent to [facility]/pharmacy #[id number]  [facility] [address]  Phone: [phone]  This is not the preferred pharmacy just a secondary   Patient should have enough for a few days yet but Tayler Kyle would like this to be started on in case there are other issues that is going to hold off getting the pain medicine  (i e  - PA, med not avail, etc)  Please call Tayler Kyle when sent or with any issues

## 2019-01-02 DIAGNOSIS — G89.4 CHRONIC PAIN SYNDROME: ICD-10-CM

## 2019-01-02 RX ORDER — OXYCODONE AND ACETAMINOPHEN 7.5; 325 MG/1; MG/1
1 TABLET ORAL EVERY 8 HOURS PRN
Qty: 34 TABLET | Refills: 0 | Status: SHIPPED | OUTPATIENT
Start: 2019-01-10 | End: 2019-01-23 | Stop reason: SDUPTHER

## 2019-01-02 NOTE — TELEPHONE ENCOUNTER
Thirty-four tablets of oxycodone-acetaminophen 7 5/325 mg p o  to be taking t i d  sent electronically to her pharmacy dated to be filled on January 10, 2019

## 2019-01-03 NOTE — TELEPHONE ENCOUNTER
S/w pt's daughter Karl Tillman and advised script for remaining balance was sent to pharmacy requested  Shonna verbalized understanding

## 2019-01-10 DIAGNOSIS — F41.9 ANXIETY: ICD-10-CM

## 2019-01-10 RX ORDER — CLONAZEPAM 0.5 MG/1
0.25 TABLET ORAL DAILY PRN
Qty: 15 TABLET | Refills: 0 | Status: SHIPPED | OUTPATIENT
Start: 2019-01-10 | End: 2019-01-22 | Stop reason: SDUPTHER

## 2019-01-10 NOTE — TELEPHONE ENCOUNTER
Shavon Nur can you call him and explain please  Last time he didn't like what I had to say   I will owe you

## 2019-01-10 NOTE — TELEPHONE ENCOUNTER
Please explain that to either her  or Jenifer or the pt  I will order   25 klonopin once daily this month and next month will have to d/c

## 2019-01-10 NOTE — TELEPHONE ENCOUNTER
Checked the state web site and she is coming up potential dangerous for the amount of med's she is on       Last refill for this med was 12/12/2018

## 2019-01-15 ENCOUNTER — OFFICE VISIT (OUTPATIENT)
Dept: VASCULAR SURGERY | Facility: CLINIC | Age: 79
End: 2019-01-15
Payer: MEDICARE

## 2019-01-15 VITALS
HEIGHT: 60 IN | SYSTOLIC BLOOD PRESSURE: 144 MMHG | RESPIRATION RATE: 18 BRPM | TEMPERATURE: 97.8 F | HEART RATE: 86 BPM | BODY MASS INDEX: 17.38 KG/M2 | DIASTOLIC BLOOD PRESSURE: 78 MMHG

## 2019-01-15 DIAGNOSIS — I71.4 ABDOMINAL AORTIC ANEURYSM (AAA) WITHOUT RUPTURE (HCC): Primary | ICD-10-CM

## 2019-01-15 DIAGNOSIS — J44.9 COPD, MILD (HCC): ICD-10-CM

## 2019-01-15 PROCEDURE — 99215 OFFICE O/P EST HI 40 MIN: CPT | Performed by: SURGERY

## 2019-01-15 NOTE — PROGRESS NOTES
Assessment/Plan:    Abdominal aortic aneurysm (AAA) without rupture ([address] )  Long discussion in the office today with the patient, her  and her daughter who is a nurse  I reviewed the CT angiogram imaging is again with them and muna pictures to help them understand the complex anatomy  There is a heavily angulated neck, large lower accessory renal that will be covered with endograft  Large NADIYA and now high-grade stenosis of the proximal  SMA  More than likely she will need SMA revascularization before the wire to prevent any colonic ischemia  She also has very small iliacs so there is chance of rupture and hemorrhage and possible even death during the repair operation  This was all discussed with the patient and her family 2 of them understand the complexity of this endovascular repair  She is not a candidate for open repair due to COPD and wheelchair-bound status  I have asked him to get pulmonary and cardiac evaluation in preparation for any kind of repair  At this point the  Patient has not made up her mind whether she wants to proceed with anything or just continue to watch it  I gave them the option of continued close monitoring with every 3-6 monthly Doppler ultrasounds as well  One of the other issues also is the way the aneurysm is measured  Because of her severe scoliosis, we cannot get a complete accurate assessment of the true dimension of this aneurysm as the aorta has significant angulation  Diagnoses and all orders for this visit:    Abdominal aortic aneurysm (AAA) without rupture [facility])  -     Ambulatory referral to Pulmonology; Future  -     Ambulatory referral to Cardiology; Future  -     VAS abdominal aorta/iliacs; complete study; Future    COPD, mild ([address] )  -     Ambulatory referral to Pulmonology; Future          Subjective:      Patient ID: Grisel Ponce is a 66 y o  female  Patient is here to discuss repair for abdominal aortic aneurysm    She is here with her daughter and  today  History is obtained both from patient, daughter and   She denies any severe abdominal pain  She does have some chronic abdominal pain secondary to constipation  She also has chronic back pain due to scoliosis  She has significant involuntary movements and is wheelchair-bound  The following portions of the patient's history were reviewed and updated as appropriate: allergies, current medications, past family history, past medical history, past social history, past surgical history and problem list     Review of Systems   HENT: Negative  Eyes: Negative  Respiratory: Positive for shortness of breath  Cardiovascular: Negative  Gastrointestinal: Positive for abdominal pain  Endocrine: Negative  Genitourinary: Negative  Musculoskeletal: Positive for back pain  Skin: Negative  Allergic/Immunologic: Negative  Neurological: Positive for weakness  Hematological: Negative  Psychiatric/Behavioral: Negative  I have reviewed the review of systems as entered and made appropriate changes as necessaryAssessment/Plan:    Abdominal aortic aneurysm (AAA) without rupture ([address] )  Long discussion in the office today with the patient, her  and her daughter who is a nurse  I reviewed the CT angiogram imaging is again with them and muna pictures to help them understand the complex anatomy  There is a heavily angulated neck, large lower accessory renal that will be covered with endograft  Large NADIYA and now high-grade stenosis of the proximal  SMA  More than likely she will need SMA revascularization before the wire to prevent any colonic ischemia  She also has very small iliacs so there is chance of rupture and hemorrhage and possible even death during the repair operation  This was all discussed with the patient and her family 2 of them understand the complexity of this endovascular repair    She is not a candidate for open repair due to COPD and wheelchair-bound status  I have asked him to get pulmonary and cardiac evaluation in preparation for any kind of repair  At this point the  Patient has not made up her mind whether she wants to proceed with anything or just continue to watch it  I gave them the option of continued close monitoring with every 3-6 monthly Doppler ultrasounds as well  One of the other issues also is the way the aneurysm is measured  Because of her severe scoliosis, we cannot get a complete accurate assessment of the true dimension of this aneurysm as the aorta has significant angulation  Diagnoses and all orders for this visit:    Abdominal aortic aneurysm (AAA) without rupture [facility])  -     Ambulatory referral to Pulmonology; Future  -     Ambulatory referral to Cardiology; Future  -     VAS abdominal aorta/iliacs; complete study; Future    COPD, mild ([address] )  -     Ambulatory referral to Pulmonology; Future          Subjective:      Patient ID: Nikolas Ruiz is a 66 y o  female  Pt had CTA abd 11/13  Pt has known AAA  Pt has intermittent abdominal pain and back pain  Patient has a known abdominal aortic aneurysm that was diagnosed in 2012 and has slowly increased in size  Recently patient had a CT angiogram prior to coming to see us in the office to discuss repair  She has long-term abdominal pain due to constipation  She also has long-term back pain secondary to severe degenerative spine disease for which she has had multiple surgical procedures as well  She is mainly wheelchair bound now secondary to spine problems  She also has a lot of involuntary movements for which she is on different medications  The following portions of the patient's history were reviewed and updated as appropriate: allergies, current medications, past family history, past medical history, past social history, past surgical history and problem list     Review of Systems   Constitutional: Negative  HENT: Negative      Eyes: Negative  Respiratory: Negative  Cardiovascular: Negative  Gastrointestinal: Positive for abdominal pain and constipation  Endocrine: Negative  Genitourinary: Negative  Musculoskeletal: Positive for back pain and gait problem  Skin: Negative  Allergic/Immunologic: Negative  Hematological: Negative  Psychiatric/Behavioral: Negative  I have reviewed the review of systems as entered and made appropriate changes as necessary    Objective:      /78 (BP Location: Left arm, Patient Position: Sitting)   Pulse 86   Temp 97 8 °F (36 6 °C)   Resp 18   Ht 5' (1 524 m)   BMI 17 38 kg/m²          Physical Exam   Constitutional: She is oriented to person, place, and time  She appears well-developed and well-nourished  HENT:   Head: Normocephalic and atraumatic  Cardiovascular: Normal rate and regular rhythm  Pulmonary/Chest: Effort normal  No respiratory distress  She has no wheezes  Abdominal: Soft  Musculoskeletal: She exhibits no edema  Neurological: She is alert and oriented to person, place, and time  Coordination abnormal    Abnormal involuntary movements   Skin: Skin is warm and dry  Psychiatric: She has a normal mood and affect  Nursing note and vitals reviewed          Objective:      /78 (BP Location: Left arm, Patient Position: Sitting)   Pulse 86   Temp 97 8 °F (36 6 °C)   Resp 18   Ht 5' (1 524 m)   BMI 17 38 kg/m²          Physical Exam

## 2019-01-16 ENCOUNTER — TELEPHONE (OUTPATIENT)
Dept: VASCULAR SURGERY | Facility: CLINIC | Age: 79
End: 2019-01-16

## 2019-01-16 NOTE — TELEPHONE ENCOUNTER
Came into the office today to see Dr Patricia Reed  To discuss Surgery  Patient's Family didn't want to go through with signing documents at that time  Dr Patricia Reed gave scripts for Pulmonary and Cardiology  Patient didn't want to make any appointment and per Dr Patricia Reed that was fine  Patient would call when they are ready

## 2019-01-16 NOTE — ASSESSMENT & PLAN NOTE
Long discussion in the office today with the patient, her  and her daughter who is a nurse  I reviewed the CT angiogram imaging is again with them and muna pictures to help them understand the complex anatomy  There is a heavily angulated neck, large lower accessory renal that will be covered with endograft  Large NADIYA and now high-grade stenosis of the proximal  SMA  More than likely she will need SMA revascularization before the wire to prevent any colonic ischemia  She also has very small iliacs so there is chance of rupture and hemorrhage and possible even death during the repair operation  This was all discussed with the patient and her family 2 of them understand the complexity of this endovascular repair  She is not a candidate for open repair due to COPD and wheelchair-bound status  I have asked him to get pulmonary and cardiac evaluation in preparation for any kind of repair  At this point the  Patient has not made up her mind whether she wants to proceed with anything or just continue to watch it  I gave them the option of continued close monitoring with every 3-6 monthly Doppler ultrasounds as well  One of the other issues also is the way the aneurysm is measured  Because of her severe scoliosis, we cannot get a complete accurate assessment of the true dimension of this aneurysm as the aorta has significant angulation        Total time spent 45 min, more than 50% of which was spent in counseling

## 2019-01-21 ENCOUNTER — TELEPHONE (OUTPATIENT)
Dept: PAIN MEDICINE | Facility: CLINIC | Age: 79
End: 2019-01-21

## 2019-01-21 DIAGNOSIS — K59.03 THERAPEUTIC OPIOID-INDUCED CONSTIPATION (OIC): Primary | ICD-10-CM

## 2019-01-21 DIAGNOSIS — T40.2X5A THERAPEUTIC OPIOID-INDUCED CONSTIPATION (OIC): Primary | ICD-10-CM

## 2019-01-21 RX ORDER — DOCUSATE SODIUM 100 MG/1
100 CAPSULE, LIQUID FILLED ORAL 2 TIMES DAILY
Qty: 60 CAPSULE | Refills: 1 | Status: SHIPPED | OUTPATIENT
Start: 2019-01-21 | End: 2019-04-12

## 2019-01-21 NOTE — TELEPHONE ENCOUNTER
Per SI, OK to double book for med refill  S/w  and will come in Wednesday at [address],[facility]  Please put on schedule  Pt is aware of script sent to pharmacy at this time

## 2019-01-21 NOTE — TELEPHONE ENCOUNTER
Patient will need office visit to be reassessed  You may double book accordingly  If she misses another appointment, we will discharge her from the practice  I was send to her pharmacy Colace 100 mg p o  B i d  For constipation

## 2019-01-21 NOTE — TELEPHONE ENCOUNTER
S/w pt's   States fentanyl does not seem to be helping her pain and she is also having constipation  Per pt, last BM was yesterday but small  Pt states she is taking miralax daily  Pt's  also states she will be out of pain meds in a few days  Advised that pt canceled her last 2 scheduled office visits on 1/14 and 12/28  Pt's  states he is her transportation and he was hospitalized  Pt has a f/u scheduled for 2/18  Advised will make SI aware for recommendations at this time regarding pain medication and constipation

## 2019-01-21 NOTE — TELEPHONE ENCOUNTER
Pt's  stopped in and said pt is going to be out of pills in a couple days  He says he wants her off Fentynal  He thinks she is having opioid constipation  She missed her last 2 appts because her  was in the hospital twice, both for 10 days at a time  Before she is taken off Fentynal, the pt's  has to see Dr Holly Turner call  [phone]

## 2019-01-22 ENCOUNTER — OFFICE VISIT (OUTPATIENT)
Dept: FAMILY MEDICINE CLINIC | Facility: CLINIC | Age: 79
End: 2019-01-22
Payer: MEDICARE

## 2019-01-22 VITALS
HEART RATE: 104 BPM | OXYGEN SATURATION: 86 % | BODY MASS INDEX: 16.1 KG/M2 | WEIGHT: 82 LBS | TEMPERATURE: 98.2 F | SYSTOLIC BLOOD PRESSURE: 140 MMHG | HEIGHT: 60 IN | RESPIRATION RATE: 16 BRPM | DIASTOLIC BLOOD PRESSURE: 80 MMHG

## 2019-01-22 DIAGNOSIS — I71.4 ABDOMINAL AORTIC ANEURYSM (AAA) WITHOUT RUPTURE (HCC): Primary | ICD-10-CM

## 2019-01-22 DIAGNOSIS — F41.9 ANXIETY: ICD-10-CM

## 2019-01-22 DIAGNOSIS — Z79.891 LONG-TERM CURRENT USE OF OPIATE ANALGESIC: ICD-10-CM

## 2019-01-22 PROCEDURE — 99214 OFFICE O/P EST MOD 30 MIN: CPT | Performed by: FAMILY MEDICINE

## 2019-01-22 RX ORDER — OCTISALATE, AVOBENZONE, HOMOSALATE, AND OCTOCRYLENE 29.4; 29.4; 49; 25.48 MG/ML; MG/ML; MG/ML; MG/ML
LOTION TOPICAL DAILY
COMMUNITY

## 2019-01-22 RX ORDER — CLONAZEPAM 0.5 MG/1
0.5 TABLET ORAL DAILY PRN
Qty: 15 TABLET | Refills: 0 | Status: SHIPPED | OUTPATIENT
Start: 2019-01-22 | End: 2019-02-08 | Stop reason: SDUPTHER

## 2019-01-22 NOTE — PROGRESS NOTES
Assessment/Plan:     Chronic Problems:  Abnormal involuntary movements  Pt still with movement disorder and refuses to f/u with any further specialists  Abdominal aortic aneurysm (AAA) without rupture ([facility])  Keep the f/u with Dr Janice Magana  Have the preop work done  Anxiety  Long discussion with pt and   Will give one last script for klonopin but further scripts will be from either Dr Alesha Roberts or palliative care  Visit Diagnosis:  Diagnoses and all orders for this visit:    Abdominal aortic aneurysm (AAA) without rupture [facility])  -     Ambulatory referral to Palliative Care; Future    Long-term current use of opiate analgesic  -     Ambulatory referral to Palliative Care; Future    Anxiety  -     clonazePAM (KlonoPIN) 0 5 mg tablet; Take 1 tablet (0 5 mg total) by mouth daily as needed for anxiety (as needed)  -     Ambulatory referral to Palliative Care; Future    Anxiety  Comments: Will increase the Klonopin to q i d   Orders:  -     clonazePAM (KlonoPIN) 0 5 mg tablet; Take 1 tablet (0 5 mg total) by mouth daily as needed for anxiety (as needed)  -     Ambulatory referral to Palliative Care; Future    Other orders  -     Probiotic Product (ALIGN) 4 MG CAPS;           Subjective:    Patient ID: Jarrett Guillen is a 66 y o  female  Pt is here ac by  with concerns that her klonopin was cut back to 1/2 the dosage  Ac to  pt follows with Dr Janice Magana for abdominal aortic aneurysm with high grade stenosis of the left sma  Plan is for surgery, but pt, needs to have pft's and cardiology f/u appt prior to SMA revascularization of the iliac  Pt wants her klonopin and is upset that I had to cut back the dose  Takes all other meds as directed  No side effects noted      Back Pain   This is a chronic problem  The current episode started more than 1 year ago  The problem occurs constantly  The problem is unchanged  The pain is present in the lumbar spine   The quality of the pain is described as aching  The pain does not radiate  The pain is at a severity of 9/10  The pain is the same all the time  The symptoms are aggravated by bending  Associated symptoms include abdominal pain, bowel incontinence, weakness and weight loss  Pertinent negatives include no bladder incontinence, chest pain, dysuria, fever, headaches, leg pain, numbness, paresis, paresthesias, pelvic pain, perianal numbness or tingling  Risk factors include history of osteoporosis  The following portions of the patient's history were reviewed and updated as appropriate: allergies, current medications, past family history, past medical history, past social history, past surgical history and problem list     Review of Systems   Constitutional: Positive for weight loss  Negative for fever  HENT: Negative  Eyes: Negative  Respiratory: Negative for cough, shortness of breath and wheezing  Still smoking  Cardiovascular: Negative for chest pain and palpitations  Gastrointestinal: Positive for abdominal pain, bowel incontinence and constipation  Negative for diarrhea, nausea and vomiting  Genitourinary: Negative for bladder incontinence, dysuria and pelvic pain  Musculoskeletal: Positive for back pain  Neurological: Positive for weakness  Negative for tingling, numbness, headaches and paresthesias  Psychiatric/Behavioral: Positive for dysphoric mood  The patient is nervous/anxious  /80   Pulse 104   Temp 98 2 °F (36 8 °C)   Resp 16   Ht 5' (1 524 m)   Wt 37 2 kg (82 lb)   SpO2 (!) 86%   BMI 16 01 kg/m²   Social History     Social History    Marital status: /Civil Union     Spouse name: N/A    Number of children: N/A    Years of education: N/A     Occupational History    Not on file       Social History Main Topics    Smoking status: Current Every Day Smoker    Smokeless tobacco: Never Used    Alcohol use Yes      Comment: conflicting both occasionally an no use per [employer]    Drug use: No    Sexual activity: Not on file     Other Topics Concern    Not on file     Social History Narrative    Always uses seatbelt         Past Medical History:   Diagnosis Date    Abdominal aortic aneurysm (AAA) 3 0 cm to 5 0 cm in diameter in female ([facility] [address] ) 06/08/2018    4 9 cm     Anxiety     Chronic obstructive lung disease (HCC)     Compression fracture of thoracic vertebra (HCC)     last assessed 2/20/17    Depression     Dysthymia     Failure to thrive in adult     Fracture of lumbar vertebra ([facility] [address] )     Hypertension     benign essential    Hyponatremia      Family History   Problem Relation Age of Onset    Cirrhosis Mother         hepatic    Bone cancer Father      Past Surgical History:   Procedure Laterality Date    APPENDECTOMY      HYSTERECTOMY         Current Outpatient Prescriptions:     aspirin 81 MG tablet, Take by mouth, Disp: , Rfl:     Calcium Carb-Cholecalciferol (CALCIUM 600 + D) 600-200 MG-UNIT TABS, Take by mouth, Disp: , Rfl:     clonazePAM (KlonoPIN) 0 5 mg tablet, Take 1 tablet (0 5 mg total) by mouth daily as needed for anxiety (as needed), Disp: 15 tablet, Rfl: 0    cyclobenzaprine (FLEXERIL) 10 mg tablet, Take 1 tablet (10 mg total) by mouth daily at bedtime for 30 days, Disp: 30 tablet, Rfl: 1    docusate sodium (COLACE) 100 mg capsule, Take 1 capsule (100 mg total) by mouth 2 (two) times a day for 30 days, Disp: 60 capsule, Rfl: 1    fentaNYL (DURAGESIC) 25 mcg/hr, Place 1 patch q72hrs, Disp: 10 patch, Rfl: 0    gabapentin (NEURONTIN) 300 mg capsule, Take 1 capsule (300 mg total) by mouth 3 (three) times a day for 30 days, Disp: 90 capsule, Rfl: 0    Multiple Vitamins-Minerals (MULTIVITAMIN ADULT PO), Take by mouth, Disp: , Rfl:     oxyCODONE-acetaminophen (PERCOCET) 7 5-325 MG per tablet, Take 1 tablet by mouth every 8 (eight) hours as needed for moderate pain Max Daily Amount: 3 tablets, Disp: 34 tablet, Rfl: 0    Probiotic Product (ALIGN) 4 MG CAPS, , Disp: , Rfl:     Psyllium (METAMUCIL) 28 3 % POWD, Take by mouth, Disp: , Rfl:     traZODone (DESYREL) 50 mg tablet, Take 1 tablet (50 mg total) by mouth daily at bedtime, Disp: 30 tablet, Rfl: 3    No Known Allergies       Lab Review   No visits with results within 2 Month(s) from this visit  Latest known visit with results is:   Transcribe Orders on 10/30/2018   Component Date Value    Venipuncture 10/31/2018 Collected     Sodium 10/31/2018 134*    Potassium 10/31/2018 4 1     Chloride 10/31/2018 98*    CO2 10/31/2018 32     ANION GAP 10/31/2018 4     BUN 10/31/2018 9     Creatinine 10/31/2018 0 74     Glucose 10/31/2018 82     Calcium 10/31/2018 9 1     eGFR 10/31/2018 78         Imaging: No results found  Objective:     Physical Exam   Constitutional: She is oriented to person, place, and time  She appears well-developed and well-nourished  No distress  HENT:   Head: Normocephalic and atraumatic  Right Ear: External ear normal    Left Ear: External ear normal    Mouth/Throat: Oropharynx is clear and moist    Eyes: Pupils are equal, round, and reactive to light  Conjunctivae and EOM are normal  Right eye exhibits no discharge  Left eye exhibits no discharge  Neck: Normal range of motion  Neck supple  Cardiovascular: Normal rate, regular rhythm and normal heart sounds  Exam reveals no friction rub  No murmur heard  Pulmonary/Chest: Effort normal and breath sounds normal  No respiratory distress  She has no wheezes  She has no rales  She exhibits no tenderness  Abdominal: Soft  Bowel sounds are normal  There is tenderness (to entire lower abdomen  )  Musculoskeletal: Normal range of motion  She exhibits no edema, tenderness or deformity  Neurological: She is alert and oriented to person, place, and time  No cranial nerve deficit  Pt with severe movement disorder of upper body, head and arms  Skin: Skin is warm and dry  No rash noted  She is not diaphoretic     Psychiatric: She has a normal mood and affect  Her behavior is normal  Judgment and thought content normal          Patient Instructions   Discussed all with patient and  and explained the PA system  After this prescription I will no longer be able to write for Klonopin as I have received several alerts from the PA system  Keep the follow-up with Dr Sav oHrn, however I will refer to palliative care for a consult  Keep the appointment with Dr Marivel Linton, my suggestion would be to have your son who is the doctor come and speak with Dr Marivel Linton about his concerns  Pt refuses all vaccines  ELMA [place]    Portions of the record may have been created with voice recognition software   Occasional wrong word or "sound a like" substitutions may have occurred due to the inherent limitations of voice recognition software   Read the chart carefully and recognize, using context, where substitutions have occurred

## 2019-01-22 NOTE — ASSESSMENT & PLAN NOTE
Long discussion with pt and   Will give one last script for klonopin but further scripts will be from either Dr Park Sierra or palliative care

## 2019-01-22 NOTE — PATIENT INSTRUCTIONS
Discussed all with patient and  and explained the PA system  After this prescription I will no longer be able to write for Klonopin as I have received several alerts from the PA system  Keep the follow-up with Dr Vicky Martini, however I will refer to palliative care for a consult  Keep the appointment with Dr Nickie Dickinson, my suggestion would be to have your son who is the doctor come and speak with Dr Nickie Dickinson about his concerns  Pt refuses all vaccines

## 2019-01-23 ENCOUNTER — OFFICE VISIT (OUTPATIENT)
Dept: PAIN MEDICINE | Facility: CLINIC | Age: 79
End: 2019-01-23
Payer: MEDICARE

## 2019-01-23 ENCOUNTER — TELEPHONE (OUTPATIENT)
Dept: FAMILY MEDICINE CLINIC | Facility: CLINIC | Age: 79
End: 2019-01-23

## 2019-01-23 VITALS
RESPIRATION RATE: 16 BRPM | DIASTOLIC BLOOD PRESSURE: 62 MMHG | SYSTOLIC BLOOD PRESSURE: 96 MMHG | HEART RATE: 76 BPM | WEIGHT: 82 LBS | BODY MASS INDEX: 16.1 KG/M2 | HEIGHT: 60 IN

## 2019-01-23 DIAGNOSIS — I71.4 ABDOMINAL AORTIC ANEURYSM (AAA) WITHOUT RUPTURE (HCC): ICD-10-CM

## 2019-01-23 DIAGNOSIS — F11.20 UNCOMPLICATED OPIOID DEPENDENCE (HCC): ICD-10-CM

## 2019-01-23 DIAGNOSIS — G89.4 CHRONIC PAIN DISORDER: ICD-10-CM

## 2019-01-23 DIAGNOSIS — M54.14 THORACIC RADICULOPATHY: ICD-10-CM

## 2019-01-23 DIAGNOSIS — G89.4 CHRONIC PAIN SYNDROME: Primary | ICD-10-CM

## 2019-01-23 DIAGNOSIS — Z79.891 LONG-TERM CURRENT USE OF OPIATE ANALGESIC: ICD-10-CM

## 2019-01-23 DIAGNOSIS — S22.000G CLOSED WEDGE FRACTURE OF THORACIC VERTEBRA WITH DELAYED HEALING, UNSPECIFIED THORACIC VERTEBRAL LEVEL, SUBSEQUENT ENCOUNTER: ICD-10-CM

## 2019-01-23 PROCEDURE — 99214 OFFICE O/P EST MOD 30 MIN: CPT | Performed by: ANESTHESIOLOGY

## 2019-01-23 RX ORDER — OXYCODONE AND ACETAMINOPHEN 7.5; 325 MG/1; MG/1
1 TABLET ORAL EVERY 8 HOURS PRN
Qty: 34 TABLET | Refills: 0 | Status: SHIPPED | OUTPATIENT
Start: 2019-02-10 | End: 2019-01-23 | Stop reason: SDUPTHER

## 2019-01-23 RX ORDER — OXYCODONE AND ACETAMINOPHEN 7.5; 325 MG/1; MG/1
1 TABLET ORAL EVERY 8 HOURS PRN
Qty: 90 TABLET | Refills: 0 | Status: SHIPPED | OUTPATIENT
Start: 2019-02-22 | End: 2019-01-23 | Stop reason: SDUPTHER

## 2019-01-23 RX ORDER — CYCLOBENZAPRINE HCL 10 MG
10 TABLET ORAL
Qty: 30 TABLET | Refills: 1 | Status: SHIPPED | OUTPATIENT
Start: 2019-01-23 | End: 2019-03-20

## 2019-01-23 RX ORDER — NALOXONE HYDROCHLORIDE 4 MG/.1ML
SPRAY NASAL
Qty: 1 EACH | Refills: 1 | Status: SHIPPED | OUTPATIENT
Start: 2019-01-23 | End: 2019-02-18 | Stop reason: SDUPTHER

## 2019-01-23 RX ORDER — OXYCODONE AND ACETAMINOPHEN 7.5; 325 MG/1; MG/1
1 TABLET ORAL EVERY 8 HOURS PRN
Qty: 90 TABLET | Refills: 0 | Status: SHIPPED | OUTPATIENT
Start: 2019-02-10 | End: 2019-01-23 | Stop reason: SDUPTHER

## 2019-01-23 RX ORDER — OXYCODONE AND ACETAMINOPHEN 7.5; 325 MG/1; MG/1
1 TABLET ORAL EVERY 8 HOURS PRN
Qty: 90 TABLET | Refills: 0 | Status: SHIPPED | OUTPATIENT
Start: 2019-03-12 | End: 2019-01-23 | Stop reason: SDUPTHER

## 2019-01-23 RX ORDER — FENTANYL 12 UG/H
1 PATCH TRANSDERMAL
Qty: 2 PATCH | Refills: 0 | Status: SHIPPED | OUTPATIENT
Start: 2019-01-23 | End: 2019-02-12

## 2019-01-23 RX ORDER — OXYCODONE AND ACETAMINOPHEN 7.5; 325 MG/1; MG/1
1 TABLET ORAL EVERY 8 HOURS PRN
Qty: 90 TABLET | Refills: 0 | Status: SHIPPED | OUTPATIENT
Start: 2019-01-23 | End: 2019-02-12

## 2019-01-23 NOTE — TELEPHONE ENCOUNTER
I spoke to Palliative Care but she does not have any of the "life limiting" disorders that they generally see, they are going to review her chart and contacting our office once they determine if she qualifies for pallative care    They general see patients with dementia, severe or chronic lung diseases, cancers or other life limiting illness

## 2019-01-23 NOTE — PROGRESS NOTES
Assessment:  1  Chronic pain syndrome  fentaNYL (DURAGESIC) 12 mcg/hr TD 72 hr patch    oxyCODONE-acetaminophen (PERCOCET) 7 5-325 MG per tablet    DISCONTINUED: oxyCODONE-acetaminophen (PERCOCET) 7 5-325 MG per tablet    DISCONTINUED: oxyCODONE-acetaminophen (PERCOCET) 7 5-325 MG per tablet    DISCONTINUED: oxyCODONE-acetaminophen (PERCOCET) 7 5-325 MG per tablet    DISCONTINUED: oxyCODONE-acetaminophen (PERCOCET) 7 5-325 MG per tablet   2  Abdominal aortic aneurysm (AAA) without rupture [facility])  Ambulatory referral to Pulmonology   3  Long-term current use of opiate analgesic  fentaNYL (DURAGESIC) 12 mcg/hr TD 72 hr patch    Naloxone HCl 4 MG/0 1ML LIQD   4  Uncomplicated opioid dependence ([address] )     5  Chronic pain disorder  cyclobenzaprine (FLEXERIL) 10 mg tablet   6  Thoracic radiculopathy  cyclobenzaprine (FLEXERIL) 10 mg tablet   7  Closed wedge fracture of thoracic vertebra with delayed healing, unspecified thoracic vertebral level, subsequent encounter  cyclobenzaprine (FLEXERIL) 10 mg tablet     Plan:  The patient is a 77-year-old female with a history of chronic pain syndrome secondary to thoracic radiculopathy, stemming from multiple thoracic and lumbar compression fractures causing radicular symptoms   Patient is on fentanyl patch 25 mcg Q 72 hours and Oxycodone acetaminophen 7 5/325 mg q8hrs prn   In addition, patient takes gabapentin 300 mg p o  T i d  And Flexeril 10 mg p o  [facility]   Patient denies aberrant behavior   Patient reports inadequate pain relief   Patient continues to perform ADLs without difficulty   Patient reports constipation and is on miralax which helps      Today, patient was given a prescription refill for oxycodone acetaminophen 7 5/325 mg p o  TID P r n  Pain and also a prescription for fentanyl patch decreased to 12 5 mcg Q 72 hours x 2 days supply as she is weaning off   Prescription for fentanyl patch dated to be filled today   Percocet dated for 1/23/19 and 2/22/19  I will see her back in 8 weeks for reassessment and medication refill  She was cautioned regarding etoh use with opiates and a benzodiazepine which can cause death  She verbalizes understanding  She will wean off clonazepam  Repeat UDS next office vist  Advised to abstain from EToh  There are risks associated with opioid medications, including dependence, addiction and tolerance  The patient understands and agrees to use these medications only as prescribed  Potential side effects of the medications include, but are not limited to, constipation, drowsiness, addiction, impaired judgment and risk of fatal overdose if not taken as prescribed  The patient was warned against driving while taking sedation medications  Sharing medications is a felony  At this point in time, the patient is showing no signs of addiction, abuse, diversion or suicidal ideation  [place] Prescription Drug Monitoring Program report was reviewed and was appropriate     I will place an order for naloxone nasal spray  Patient is advised to apply to each nostril in the event of overdose from opiates  My impressions and treatment recommendations were discussed in detail with the patient who verbalized understanding and had no further questions  Discharge instructions were provided  I personally saw and examined the patient and I agree with the above discussed plan of care  History of Present Illness:  Marylee Perking is a 66 y o  female who presents for a follow up office visit in regards to Back Pain and Abdominal Pain  The patients current symptoms include a constant, sharp pain across the low back   The patient is currently on fentanyl patch 25 mcg Q 72 hours and oxycodone acetaminophen 7 5/325 mg p o  t i d  P r n  For breakthrough pain   Patient reports that the medication is no longer helping  She was cut back from 50mcg of fentanyl due to excessive drowsiness  Patient is currently on MiraLax for constipation with some relief   Patient reports 9/10 pain  Patient denies aberrant behavior  Patient reports having more pain  Patient continues to perform ADLs without difficulty  Patient reports occasional constipation  Patient states that she would like to wean herself off the fentanyl patch  Last UDS was positive for alcohol  She is on clonazepam written by her pcp  She is aware of potential dangerous interaction and was advised to cut back and wean off clonzepam     She reports drinking one beer occasionally  Pain Contract Signed: 2/6/18, Last Urine Drug Screen: 6/4/18  I have personally reviewed and/or updated the patient's past medical history, past surgical history, family history, social history, current medications, allergies, and vital signs today  Review of Systems   Respiratory: Negative for shortness of breath  Cardiovascular: Negative for chest pain  Gastrointestinal: Positive for constipation  Negative for diarrhea, nausea and vomiting  Musculoskeletal: Positive for gait problem  Negative for arthralgias, joint swelling and myalgias  Decreased ROM   Skin: Negative for rash  Neurological: Negative for dizziness, seizures and weakness  All other systems reviewed and are negative        Patient Active Problem List   Diagnosis    Abdominal pain    Abnormal involuntary movements    Cataract, bilateral    Chronic pain disorder    Closed wedge fracture of thoracic vertebra with delayed healing    COPD, mild (HCC)    Generalized weakness    HTN (hypertension)    Hyponatremia    Irritable bowel syndrome without diarrhea    Osteoporosis    Thoracic radiculopathy    Closed fracture of third lumbar vertebra with routine healing    Long-term current use of opiate analgesic    Uncomplicated opioid dependence ([address] )    Movement disorder    Abdominal aortic aneurysm (AAA) without rupture ([address] )    Tobacco abuse    Anxiety       Past Medical History:   Diagnosis Date    Abdominal aortic aneurysm (AAA) 3 0 cm to 5 0 cm in diameter in female ([facility] [address] ) 06/08/2018    4 9 cm     Anxiety     Chronic obstructive lung disease (HCC)     Compression fracture of thoracic vertebra (HCC)     last assessed 2/20/17    Depression     Dysthymia     Failure to thrive in adult     Fracture of lumbar vertebra (HCC)     Hypertension     benign essential    Hyponatremia        Past Surgical History:   Procedure Laterality Date    APPENDECTOMY      HYSTERECTOMY         Family History   Problem Relation Age of Onset    Cirrhosis Mother         hepatic    Bone cancer Father        Social History     Occupational History    Not on file       Social History Main Topics    Smoking status: Current Every Day Smoker    Smokeless tobacco: Never Used    Alcohol use Yes      Comment: conflicting both occasionally an no use per [employer]    Drug use: No    Sexual activity: Not on file       Current Outpatient Prescriptions on File Prior to Visit   Medication Sig    aspirin 81 MG tablet Take by mouth    Calcium Carb-Cholecalciferol (CALCIUM 600 + D) 600-200 MG-UNIT TABS Take by mouth    clonazePAM (KlonoPIN) 0 5 mg tablet Take 1 tablet (0 5 mg total) by mouth daily as needed for anxiety (as needed)    docusate sodium (COLACE) 100 mg capsule Take 1 capsule (100 mg total) by mouth 2 (two) times a day for 30 days    fentaNYL (DURAGESIC) 25 mcg/hr Place 1 patch q72hrs    Multiple Vitamins-Minerals (MULTIVITAMIN ADULT PO) Take by mouth    oxyCODONE-acetaminophen (PERCOCET) 7 5-325 MG per tablet Take 1 tablet by mouth every 8 (eight) hours as needed for moderate pain Max Daily Amount: 3 tablets    Probiotic Product (ALIGN) 4 MG CAPS     Psyllium (METAMUCIL) 28 3 % POWD Take by mouth    traZODone (DESYREL) 50 mg tablet Take 1 tablet (50 mg total) by mouth daily at bedtime    cyclobenzaprine (FLEXERIL) 10 mg tablet Take 1 tablet (10 mg total) by mouth daily at bedtime for 30 days    gabapentin (NEURONTIN) 300 mg capsule Take 1 capsule (300 mg total) by mouth 3 (three) times a day for 30 days     No current facility-administered medications on file prior to visit  No Known Allergies    Physical Exam:    BP 96/62   Pulse 76   Resp 16   Ht 5' (1 524 m)   Wt 37 2 kg (82 lb)   BMI 16 01 kg/m²     Constitutional:normal, well developed, well nourished, alert, in no distress and non-toxic and no overt pain behavior    Eyes:anicteric  HEENT:grossly intact  Neck:supple, symmetric, trachea midline and no masses   Pulmonary:even and unlabored  Cardiovascular:No edema or pitting edema present  Skin:Normal without rashes or lesions and well hydrated  Psychiatric:Mood and affect appropriate  Neurologic:Cranial Nerves II-XII grossly intact  Musculoskeletal:normal    Imaging

## 2019-01-24 NOTE — TELEPHONE ENCOUNTER
She does have a life threating aaa and multiple serious medical problems  Do I need to speak to someone?

## 2019-01-24 NOTE — TELEPHONE ENCOUNTER
I called and spoke with Jose Antonio Garcia from the office explaining why pt was a perfect candidate for palliative care  She will have Ochoa Spindle call me back and discuss

## 2019-01-25 ENCOUNTER — TELEPHONE (OUTPATIENT)
Dept: FAMILY MEDICINE CLINIC | Facility: CLINIC | Age: 79
End: 2019-01-25

## 2019-01-25 NOTE — TELEPHONE ENCOUNTER
[place] do you have the nu,annemarie to Palliative care   Can you please call them and let them know that

## 2019-01-25 NOTE — TELEPHONE ENCOUNTER
----- Message from Ruth Montelongo RN sent at 1/25/2019  9:14 AM EST -----  Regarding: Palliative Care referral  Per Dr Gilda Topete, he will see patient for Goals of Care discussion but will not manage her pain meds or clonazepam for anxiety, movement disorders  We will call her to schedule an appointment at [facility]  Nathanael Santos MS,RN,CNS    Please let Fahad Mariano and Rock Cornelius know they said they would see her but not mange her pain meds or clonazepam  They would do goals of care discussion with her  They are in [place]  Is she still willing to go?

## 2019-01-25 NOTE — TELEPHONE ENCOUNTER
[phone]     Spoke to pallative care they are aware, if she is interested at doing Goals for Care they will still consider her for that down the line

## 2019-01-25 NOTE — TELEPHONE ENCOUNTER
Erin Garcia I spoke with Alyse Pham and she said she does not want to do palliative care because she is sick and to weak to be going from doctor to doctor  She said she also didn't want the prolia shot anymore when I told her she was due by the end of the month  I did inform her that their are pill forms to treat the osteoporosis but Erin Garcia would have to see if you are a candidate for it  I let her know one is once a week and and the other is once a month   She said she would discuss it with Jesus Alberto and let me know

## 2019-01-30 DIAGNOSIS — S22.000G CLOSED WEDGE FRACTURE OF THORACIC VERTEBRA WITH DELAYED HEALING, UNSPECIFIED THORACIC VERTEBRAL LEVEL, SUBSEQUENT ENCOUNTER: ICD-10-CM

## 2019-01-30 DIAGNOSIS — G89.4 CHRONIC PAIN DISORDER: ICD-10-CM

## 2019-01-30 DIAGNOSIS — M54.14 THORACIC RADICULOPATHY: ICD-10-CM

## 2019-01-30 RX ORDER — GABAPENTIN 300 MG/1
CAPSULE ORAL
Qty: 90 CAPSULE | Refills: 2 | Status: SHIPPED | OUTPATIENT
Start: 2019-01-30 | End: 2019-07-27 | Stop reason: SDUPTHER

## 2019-02-01 ENCOUNTER — HOSPITAL ENCOUNTER (EMERGENCY)
Facility: HOSPITAL | Age: 79
Discharge: HOME | End: 2019-02-01
Attending: EMERGENCY MEDICINE
Payer: MEDICARE

## 2019-02-01 ENCOUNTER — APPOINTMENT (EMERGENCY)
Dept: CT IMAGING | Facility: HOSPITAL | Age: 79
End: 2019-02-01
Payer: MEDICARE

## 2019-02-01 VITALS
BODY MASS INDEX: 16.84 KG/M2 | TEMPERATURE: 99 F | DIASTOLIC BLOOD PRESSURE: 117 MMHG | HEART RATE: 107 BPM | RESPIRATION RATE: 22 BRPM | SYSTOLIC BLOOD PRESSURE: 175 MMHG | HEIGHT: 60 IN | WEIGHT: 85.76 LBS | OXYGEN SATURATION: 95 %

## 2019-02-01 DIAGNOSIS — R10.9 CHRONIC ABDOMINAL PAIN: ICD-10-CM

## 2019-02-01 DIAGNOSIS — G89.29 CHRONIC ABDOMINAL PAIN: ICD-10-CM

## 2019-02-01 DIAGNOSIS — G89.29 CHRONIC LOW BACK PAIN: ICD-10-CM

## 2019-02-01 DIAGNOSIS — M54.50 CHRONIC LOW BACK PAIN: ICD-10-CM

## 2019-02-01 DIAGNOSIS — R10.9 NONSPECIFIC ABDOMINAL PAIN: Primary | ICD-10-CM

## 2019-02-01 LAB
ALBUMIN SERPL BCP-MCNC: 3.6 G/DL (ref 3.5–5)
ALP SERPL-CCNC: 55 U/L (ref 46–116)
ALT SERPL W P-5'-P-CCNC: 9 U/L (ref 12–78)
ANION GAP SERPL CALCULATED.3IONS-SCNC: 6 MMOL/L (ref 4–13)
AST SERPL W P-5'-P-CCNC: 21 U/L (ref 5–45)
ATRIAL RATE: 114 BPM
BASOPHILS # BLD AUTO: 0.04 THOUSANDS/ΜL (ref 0–0.1)
BASOPHILS NFR BLD AUTO: 0 % (ref 0–1)
BILIRUB DIRECT SERPL-MCNC: 0.11 MG/DL (ref 0–0.2)
BILIRUB SERPL-MCNC: 0.4 MG/DL (ref 0.2–1)
BILIRUB UR QL STRIP: NEGATIVE
BUN SERPL-MCNC: 10 MG/DL (ref 5–25)
CALCIUM SERPL-MCNC: 9 MG/DL (ref 8.3–10.1)
CHLORIDE SERPL-SCNC: 99 MMOL/L (ref 100–108)
CLARITY UR: CLEAR
CO2 SERPL-SCNC: 29 MMOL/L (ref 21–32)
COLOR UR: NORMAL
CREAT SERPL-MCNC: 0.79 MG/DL (ref 0.6–1.3)
EOSINOPHIL # BLD AUTO: 0.02 THOUSAND/ΜL (ref 0–0.61)
EOSINOPHIL NFR BLD AUTO: 0 % (ref 0–6)
ERYTHROCYTE [DISTWIDTH] IN BLOOD BY AUTOMATED COUNT: 13.1 % (ref 11.6–15.1)
GFR SERPL CREATININE-BSD FRML MDRD: 72 ML/MIN/1.73SQ M
GLUCOSE SERPL-MCNC: 78 MG/DL (ref 65–140)
GLUCOSE SERPL-MCNC: 95 MG/DL (ref 65–140)
GLUCOSE UR STRIP-MCNC: NEGATIVE MG/DL
HCT VFR BLD AUTO: 42.4 % (ref 34.8–46.1)
HGB BLD-MCNC: 13.9 G/DL (ref 11.5–15.4)
HGB UR QL STRIP.AUTO: NEGATIVE
IMM GRANULOCYTES # BLD AUTO: 0.04 THOUSAND/UL (ref 0–0.2)
IMM GRANULOCYTES NFR BLD AUTO: 0 % (ref 0–2)
KETONES UR STRIP-MCNC: NEGATIVE MG/DL
LACTATE SERPL-SCNC: 1.3 MMOL/L (ref 0.5–2)
LEUKOCYTE ESTERASE UR QL STRIP: NEGATIVE
LIPASE SERPL-CCNC: 120 U/L (ref 73–393)
LYMPHOCYTES # BLD AUTO: 1.48 THOUSANDS/ΜL (ref 0.6–4.47)
LYMPHOCYTES NFR BLD AUTO: 15 % (ref 14–44)
MCH RBC QN AUTO: 31 PG (ref 26.8–34.3)
MCHC RBC AUTO-ENTMCNC: 32.8 G/DL (ref 31.4–37.4)
MCV RBC AUTO: 94 FL (ref 82–98)
MONOCYTES # BLD AUTO: 0.63 THOUSAND/ΜL (ref 0.17–1.22)
MONOCYTES NFR BLD AUTO: 6 % (ref 4–12)
NEUTROPHILS # BLD AUTO: 7.58 THOUSANDS/ΜL (ref 1.85–7.62)
NEUTS SEG NFR BLD AUTO: 79 % (ref 43–75)
NITRITE UR QL STRIP: NEGATIVE
NRBC BLD AUTO-RTO: 0 /100 WBCS
P AXIS: 75 DEGREES
PH UR STRIP.AUTO: 7.5 [PH] (ref 4.5–8)
PLATELET # BLD AUTO: 302 THOUSANDS/UL (ref 149–390)
PMV BLD AUTO: 8.6 FL (ref 8.9–12.7)
POTASSIUM SERPL-SCNC: 3.6 MMOL/L (ref 3.5–5.3)
PR INTERVAL: 146 MS
PROT SERPL-MCNC: 7.2 G/DL (ref 6.4–8.2)
PROT UR STRIP-MCNC: NEGATIVE MG/DL
QRS AXIS: 59 DEGREES
QRSD INTERVAL: 78 MS
QT INTERVAL: 324 MS
QTC INTERVAL: 446 MS
RBC # BLD AUTO: 4.49 MILLION/UL (ref 3.81–5.12)
SODIUM SERPL-SCNC: 134 MMOL/L (ref 136–145)
SP GR UR STRIP.AUTO: 1.01 (ref 1–1.03)
T WAVE AXIS: 68 DEGREES
TROPONIN I SERPL-MCNC: <0.02 NG/ML
UROBILINOGEN UR QL STRIP.AUTO: 0.2 E.U./DL
VENTRICULAR RATE: 114 BPM
WBC # BLD AUTO: 9.79 THOUSAND/UL (ref 4.31–10.16)

## 2019-02-01 PROCEDURE — 99284 EMERGENCY DEPT VISIT MOD MDM: CPT

## 2019-02-01 PROCEDURE — 83690 ASSAY OF LIPASE: CPT | Performed by: EMERGENCY MEDICINE

## 2019-02-01 PROCEDURE — 84484 ASSAY OF TROPONIN QUANT: CPT | Performed by: EMERGENCY MEDICINE

## 2019-02-01 PROCEDURE — 81003 URINALYSIS AUTO W/O SCOPE: CPT | Performed by: EMERGENCY MEDICINE

## 2019-02-01 PROCEDURE — 36415 COLL VENOUS BLD VENIPUNCTURE: CPT | Performed by: EMERGENCY MEDICINE

## 2019-02-01 PROCEDURE — 80048 BASIC METABOLIC PNL TOTAL CA: CPT | Performed by: EMERGENCY MEDICINE

## 2019-02-01 PROCEDURE — 85025 COMPLETE CBC W/AUTO DIFF WBC: CPT | Performed by: EMERGENCY MEDICINE

## 2019-02-01 PROCEDURE — 93005 ELECTROCARDIOGRAM TRACING: CPT

## 2019-02-01 PROCEDURE — 82948 REAGENT STRIP/BLOOD GLUCOSE: CPT

## 2019-02-01 PROCEDURE — 80076 HEPATIC FUNCTION PANEL: CPT | Performed by: EMERGENCY MEDICINE

## 2019-02-01 PROCEDURE — 93010 ELECTROCARDIOGRAM REPORT: CPT | Performed by: INTERNAL MEDICINE

## 2019-02-01 PROCEDURE — 83605 ASSAY OF LACTIC ACID: CPT | Performed by: EMERGENCY MEDICINE

## 2019-02-01 PROCEDURE — 74177 CT ABD & PELVIS W/CONTRAST: CPT

## 2019-02-01 RX ADMIN — IOHEXOL 80 ML: 350 INJECTION, SOLUTION INTRAVENOUS at 15:19

## 2019-02-01 NOTE — DISCHARGE INSTRUCTIONS
Continue medications  Follow up with palliative care  The referral as a Vikram Camp phone number but he does have a local office  Abdominal Pain   WHAT YOU NEED TO KNOW:   Abdominal pain can be dull, achy, or sharp  You may have pain in one area of your abdomen, or in your entire abdomen  Your pain may be caused by a condition such as constipation, food sensitivity or poisoning, infection, or a blockage  Abdominal pain can also be from a hernia, appendicitis, or an ulcer  Liver, gallbladder, or kidney conditions can also cause abdominal pain  The cause of your abdominal pain may be unknown  DISCHARGE INSTRUCTIONS:   Return to the emergency department if:   · You have new chest pain or shortness of breath  · You have pulsing pain in your upper abdomen or lower back that suddenly becomes constant  · Your pain is in the right lower abdominal area and worsens with movement  · You have a fever over 100 4°F (38°C) or shaking chills  · You are vomiting and cannot keep food or liquids down  · Your pain does not improve or gets worse over the next 8 to 12 hours  · You see blood in your vomit or bowel movements, or they look black and tarry  · Your skin or the whites of your eyes turn yellow  · You are a woman and have a large amount of vaginal bleeding that is not your monthly period  Contact your healthcare provider if:   · You have pain in your lower back  · You are a man and have pain in your testicles  · You have pain when you urinate  · You have questions or concerns about your condition or care  Follow up with your healthcare provider within 24 hours or as directed:  Write down your questions so you remember to ask them during your visits  Medicines:   · Medicines  may be given to calm your stomach and prevent vomiting or to decrease pain  Ask how to take pain medicine safely  · Take your medicine as directed    Contact your healthcare provider if you think your medicine is not helping or if you have side effects  Tell him of her if you are allergic to any medicine  Keep a list of the medicines, vitamins, and herbs you take  Include the amounts, and when and why you take them  Bring the list or the pill bottles to follow-up visits  Carry your medicine list with you in case of an emergency  © 2017 [address] [employer] Information is for End User's use only and may not be sold, redistributed or otherwise used for commercial purposes  All illustrations and images included in CareNotes® are the copyrighted property of [employer]  or Nicho Moreira  The above information is an  only  It is not intended as medical advice for individual conditions or treatments  Talk to your doctor, nurse or pharmacist before following any medical regimen to see if it is safe and effective for you

## 2019-02-01 NOTE — ED PROVIDER NOTES
History  Chief Complaint   Patient presents with    Abdominal Pain     constant generalized abdominal pain that worsened over the last few days with Hx of AAA      HPI patient is a 70-year-old female, she reports generalized abdominal pain   reports she has had this similar pain over the last several years  Patient apparently is on narcotic analgesics for both back pain and abdominal pain which she has had present for years  Patient apparently has a history of abdominal aortic aneurysm  Patient was told that it was non operable that if the operated on it, it would kill her  She complains of diffuse soreness in her abdomen   reports that he believe she might be constipated  They came to emergency department concerned she might rupture her abdominal aortic aneurysm  They deny any loss consciousness  There is no chest pain  She denies any shortness of breath  She denies any vomiting or diarrhea   reports she has had some constipation  Past medical history of back pain, abdominal aortic aneurysm, COPD,  Family history noncontributory  Social history, here with her , smoker    Prior to Admission Medications   Prescriptions Last Dose Informant Patient Reported? Taking? Calcium Carb-Cholecalciferol (CALCIUM 600 + D) 600-200 MG-UNIT TABS  Self Yes No   Sig: Take by mouth   Multiple Vitamins-Minerals (MULTIVITAMIN ADULT PO)  Self Yes No   Sig: Take by mouth   Naloxone HCl 4 MG/0 1ML LIQD   No No   Sig: Apply 1 spray in each nostril in the event of overdose from pain medication     Probiotic Product (ALIGN) 4 MG CAPS   Yes No   Psyllium (METAMUCIL) 28 3 % POWD  Self Yes No   Sig: Take by mouth   aspirin 81 MG tablet  Self Yes No   Sig: Take by mouth   clonazePAM (KlonoPIN) 0 5 mg tablet   No No   Sig: Take 1 tablet (0 5 mg total) by mouth daily as needed for anxiety (as needed)   cyclobenzaprine (FLEXERIL) 10 mg tablet   No No   Sig: Take 1 tablet (10 mg total) by mouth daily at bedtime for 30 days   docusate sodium (COLACE) 100 mg capsule   No No   Sig: Take 1 capsule (100 mg total) by mouth 2 (two) times a day for 30 days   fentaNYL (DURAGESIC) 12 mcg/hr TD 72 hr patch   No No   Sig: Place 1 patch on the skin every third day for 6 days Patient weaning off fentanyl patch - 6 days supply  Max Daily Amount: 1 patch   fentaNYL (DURAGESIC) 25 mcg/hr  Self No No   Sig: Place 1 patch q72hrs   gabapentin (NEURONTIN) 300 mg capsule  Self No No   Sig: Take 1 capsule (300 mg total) by mouth 3 (three) times a day for 30 days   gabapentin (NEURONTIN) 300 mg capsule   No No   Sig: TAKE 1 CAPSULE THREE TIMES A DAY   oxyCODONE-acetaminophen (PERCOCET) 7 5-325 MG per tablet   No No   Sig: Take 1 tablet by mouth every 8 (eight) hours as needed for moderate pain for up to 30 days Earliest Fill Date: 1/23/19 Max Daily Amount: 3 tablets   traZODone (DESYREL) 50 mg tablet  Self No No   Sig: Take 1 tablet (50 mg total) by mouth daily at bedtime      Facility-Administered Medications: None       Past Medical History:   Diagnosis Date    Abdominal aortic aneurysm (AAA) 3 0 cm to 5 0 cm in diameter in female ([facility] [address] ) 06/08/2018    4 9 cm     Anxiety     Chronic obstructive lung disease (HCC)     Compression fracture of thoracic vertebra (HCC)     last assessed 2/20/17    Depression     Dysthymia     Failure to thrive in adult     Fracture of lumbar vertebra ([facility] [address] )     Hypertension     benign essential    Hyponatremia        Past Surgical History:   Procedure Laterality Date    APPENDECTOMY      HYSTERECTOMY         Family History   Problem Relation Age of Onset    Cirrhosis Mother         hepatic    Bone cancer Father      I have reviewed and agree with the history as documented      Social History   Substance Use Topics    Smoking status: Current Every Day Smoker    Smokeless tobacco: Never Used    Alcohol use Yes      Comment: conflicting both occasionally an no use per [employer]        Review of Systems   Constitutional: Negative for diaphoresis, fatigue and fever  HENT: Negative for congestion, ear pain, nosebleeds and sore throat  Eyes: Negative for photophobia, pain, discharge and visual disturbance  Respiratory: Negative for cough, choking, chest tightness, shortness of breath and wheezing  Cardiovascular: Negative for chest pain and palpitations  Gastrointestinal: Positive for abdominal pain  Negative for abdominal distention, diarrhea and vomiting  Genitourinary: Negative for dysuria, flank pain and frequency  Musculoskeletal: Negative for back pain, gait problem and joint swelling  Skin: Negative for color change and rash  Neurological: Negative for dizziness, syncope and headaches  Psychiatric/Behavioral: Negative for behavioral problems and confusion  The patient is not nervous/anxious  All other systems reviewed and are negative  Physical Exam  Physical Exam   Constitutional: She is oriented to person, place, and time  She appears well-developed and well-nourished  HENT:   Head: Normocephalic  Right Ear: External ear normal    Left Ear: External ear normal    Nose: Nose normal    Mouth/Throat: Oropharynx is clear and moist    Eyes: Pupils are equal, round, and reactive to light  EOM and lids are normal    Neck: Normal range of motion  Neck supple  Cardiovascular: Normal rate, regular rhythm, normal heart sounds and intact distal pulses  Pulmonary/Chest: Effort normal and breath sounds normal  No respiratory distress  Abdominal: Soft  Bowel sounds are normal  She exhibits no distension and no mass  There is tenderness  There is no rebound and no guarding  No hernia  There is mild diffuse tenderness without rebound or guarding, no pulsatile masses  Good distal pulses  Musculoskeletal: Normal range of motion  She exhibits no deformity  Neurological: She is alert and oriented to person, place, and time  Skin: Skin is warm and dry     Psychiatric: She has a normal mood and affect  Nursing note and vitals reviewed  Pulse oximetry 94% on room air adequate oxygenation there is no hypoxia    Vital Signs  ED Triage Vitals [02/01/19 1355]   Temperature Pulse Respirations Blood Pressure SpO2   99 °F (37 2 °C) (!) 114 22 (!) 184/126 94 %      Temp Source Heart Rate Source Patient Position - Orthostatic VS BP Location FiO2 (%)   Oral Monitor -- Right arm --      Pain Score       9           Vitals:    02/01/19 1355 02/01/19 1530 02/01/19 1630   BP: (!) 184/126 (!) 193/95 (!) 175/117   Pulse: (!) 114 101 (!) 107       Visual Acuity      ED Medications  Medications   iohexol (OMNIPAQUE) 350 MG/ML injection (MULTI-DOSE) 80 mL (80 mL Intravenous Given 2/1/19 1519)       Diagnostic Studies  Results Reviewed     Procedure Component Value Units Date/Time    Troponin I [[id number]]  (Normal) Collected:  02/01/19 1420    Lab Status:  Final result Specimen:  Blood from Arm, Left Updated:  02/01/19 1449     Troponin I <0 02 ng/mL     Lactic acid, plasma [[id number]]  (Normal) Collected:  02/01/19 1420    Lab Status:  Final result Specimen:  Blood from Arm, Left Updated:  02/01/19 1448     LACTIC ACID 1 3 mmol/L     Narrative:         Result may be elevated if tourniquet was used during collection  Basic metabolic panel [[id number]]  (Abnormal) Collected:  02/01/19 1420    Lab Status:  Final result Specimen:  Blood from Arm, Left Updated:  02/01/19 1445     Sodium 134 (L) mmol/L      Potassium 3 6 mmol/L      Chloride 99 (L) mmol/L      CO2 29 mmol/L      ANION GAP 6 mmol/L      BUN 10 mg/dL      Creatinine 0 79 mg/dL      Glucose 95 mg/dL      Calcium 9 0 mg/dL      eGFR 72 ml/min/1 73sq m     Narrative:         National Kidney Disease Education Program recommendations are as follows:  GFR calculation is accurate only with a steady state creatinine  Chronic Kidney disease less than 60 ml/min/1 73 sq  meters  Kidney failure less than 15 ml/min/1 73 sq  meters      Hepatic function panel [[id number]]  (Abnormal) Collected:  02/01/19 1420    Lab Status:  Final result Specimen:  Blood from Arm, Left Updated:  02/01/19 1445     Total Bilirubin 0 40 mg/dL      Bilirubin, Direct 0 11 mg/dL      Alkaline Phosphatase 55 U/L      AST 21 U/L      ALT 9 (L) U/L      Total Protein 7 2 g/dL      Albumin 3 6 g/dL     Lipase [[id number]]  (Normal) Collected:  02/01/19 1420    Lab Status:  Final result Specimen:  Blood from Arm, Left Updated:  02/01/19 1445     Lipase 120 u/L     CBC and differential [[id number]]  (Abnormal) Collected:  02/01/19 1420    Lab Status:  Final result Specimen:  Blood from Arm, Left Updated:  02/01/19 1428     WBC 9 79 Thousand/uL      RBC 4 49 Million/uL      Hemoglobin 13 9 g/dL      Hematocrit 42 4 %      MCV 94 fL      MCH 31 0 pg      MCHC 32 8 g/dL      RDW 13 1 %      MPV 8 6 (L) fL      Platelets 137 Thousands/uL      nRBC 0 /100 WBCs      Neutrophils Relative 79 (H) %      Immat GRANS % 0 %      Lymphocytes Relative 15 %      Monocytes Relative 6 %      Eosinophils Relative 0 %      Basophils Relative 0 %      Neutrophils Absolute 7 58 Thousands/µL      Immature Grans Absolute 0 04 Thousand/uL      Lymphocytes Absolute 1 48 Thousands/µL      Monocytes Absolute 0 63 Thousand/µL      Eosinophils Absolute 0 02 Thousand/µL      Basophils Absolute 0 04 Thousands/µL     UA w Reflex to Microscopic w Reflex to Culture [[id number]] Collected:  02/01/19 1411    Lab Status:  Final result Specimen:  Urine from Urine, Other Updated:  02/01/19 1420     Color, UA Light Yellow     Clarity, UA Clear     Specific Gravity, UA 1 010     pH, UA 7 5     Leukocytes, UA Negative     Nitrite, UA Negative     Protein, UA Negative mg/dl      Glucose, UA Negative mg/dl      Ketones, UA Negative mg/dl      Urobilinogen, UA 0 2 E U /dl      Bilirubin, UA Negative     Blood, UA Negative    Fingerstick Glucose (POCT) [[id number]]  (Normal) Collected:  02/01/19 1353    Lab Status:  Final result Updated:  02/01/19 1355     POC Glucose 78 mg/dl                  CT abdomen pelvis with contrast   Final Result by Wild Marcos MD (02/01 4541)   1  Left eccentric partially thrombosed infrarenal aneurysm with maximal cross-sectional diameter 5 1 cm is unchanged  No evidence of rupture as clinically queried  2   Colonic diverticulosis without diverticulitis  3   Numerous spinal compression fractures are unchanged  Workstation performed: [id number]                    Procedures  ECG 12 Lead Documentation  Date/Time: 2/1/2019 3:19 PM  Performed by: Virginia Villanueva  Authorized by: Virginia Villanueva     Indications / Diagnosis:  Abdominal pain  ECG reviewed by me, the ED Provider: yes    Patient location:  ED  Previous ECG:     Previous ECG:  Compared to current    Comparison ECG info: May 15, 2018    Similarity:  No change  Interpretation:     Interpretation: non-specific    Rate:     ECG rate:  114    ECG rate assessment: tachycardic    Rhythm:     Rhythm: sinus tachycardia    Ectopy:     Ectopy: none    ST segments:     ST segments:  Non-specific  Comments:      Sinus tachycardia, no acute ST-T wave changes, no real change from prior EKG from May 15, 2018             Phone Contacts  ED Phone Contact    ED Course      diagnostic testing showed normal cardiac troponin no sign of cardiac ischemia troponin was required because the patient had abdominal pain which could be referred from her heart woman present with atypical symptoms of coronary artery disease  Lactate was normal at 1 3 no sign of ischemic bowel, patient's electrolytes were within normal limits normal BUN creatinine normal renal function no sign of kidney injury  Liver functions were normal no sign of hepatitis  Lipase was normal no sign of pancreatitis  patient's white count was normal at 9 7 no sign of inflammation, hemoglobin normal at 13 9 no sign of anemia  Urinalysis showed no sign of infection    CT scan abdomen pelvis showed infrarenal aneurysm unchanged  Colonic diverticulosis without diverticulitis  Numerous spinal compression fractures  All known to the family  Discussed with patient and family, resting tachycardia with some hypertension they reports she has recently decreased her clonazepam ; reduced her fentanyl and also she is on a Nicoderm patch  Please would explain her tachycardia  Patient did not want any additional analgesics  MDM Medical decision-making: based on my review of the history, physical exam, laboratory testing and diagnostic CT scan; I believe the patient has Nonspecific abdominal pain at this time  No indication for further evaluation  No indication for hospitalization  The patient will follow-up as an outpatient  Patient has chronic medical problems, discussed with family at length a strongly advised palliative care they agree  They will follow up as an outpatient  Patient has multiple comorbidities and be therefore has a poor prognosis but should focus on quality of life  She has chronic pain  Would benefit from consultation  Disposition  Final diagnoses:   Nonspecific abdominal pain   Chronic abdominal pain   Chronic low back pain     Time reflects when diagnosis was documented in both MDM as applicable and the Disposition within this note     Time User Action Codes Description Comment    2/1/2019  4:29 PM Mario Florence Add [R10 9] Nonspecific abdominal pain     2/1/2019  4:47 PM Mario Dubons Add [R10 9,  G89 29] Chronic abdominal pain     2/1/2019  4:47 PM Mario Florence Add [M54 5,  G89 29] Chronic low back pain       ED Disposition     ED Disposition Condition Date/Time Comment    Discharge  Fri Feb 1, 2019  4:30 PM Nenita Lozada discharge to home/self care      Condition at discharge: Good        Follow-up Information     Follow up With Specialties Details Why [address]  [phone] Patient's Medications   Discharge Prescriptions    No medications on file     No discharge procedures on file      ED Provider  Electronically Signed by           Tavo Magaña MD  02/01/19 8981

## 2019-02-08 ENCOUNTER — TELEPHONE (OUTPATIENT)
Dept: FAMILY MEDICINE CLINIC | Facility: CLINIC | Age: 79
End: 2019-02-08

## 2019-02-08 DIAGNOSIS — F41.9 ANXIETY: Primary | ICD-10-CM

## 2019-02-08 RX ORDER — CLONAZEPAM 0.5 MG/1
0.5 TABLET, ORALLY DISINTEGRATING ORAL 2 TIMES DAILY
Qty: 60 TABLET | Refills: 3 | Status: SHIPPED | OUTPATIENT
Start: 2019-02-08 | End: 2019-06-11 | Stop reason: SDUPTHER

## 2019-02-08 NOTE — TELEPHONE ENCOUNTER
After long discussion with son and  hospice was discussed   They will discuss this with Donte Tierney and let me known/

## 2019-02-11 ENCOUNTER — TELEPHONE (OUTPATIENT)
Dept: PAIN MEDICINE | Facility: CLINIC | Age: 79
End: 2019-02-11

## 2019-02-11 NOTE — TELEPHONE ENCOUNTER
Pt's  wants to know if he can have a talk with Dr Eduardo Quivers re pt  He really wants an appt  Pls advise  Pt's call back # is [phone]

## 2019-02-12 DIAGNOSIS — G89.4 CHRONIC PAIN SYNDROME: ICD-10-CM

## 2019-02-12 RX ORDER — FENTANYL 25 UG/H
PATCH TRANSDERMAL
Qty: 10 PATCH | Refills: 0 | Status: SHIPPED | OUTPATIENT
Start: 2019-02-12 | End: 2019-03-20 | Stop reason: SDUPTHER

## 2019-02-12 RX ORDER — OXYCODONE HYDROCHLORIDE AND ACETAMINOPHEN 5; 325 MG/1; MG/1
1 TABLET ORAL EVERY 6 HOURS PRN
Qty: 120 TABLET | Refills: 0 | Status: SHIPPED | OUTPATIENT
Start: 2019-02-12 | End: 2019-02-18 | Stop reason: SDUPTHER

## 2019-02-12 NOTE — TELEPHONE ENCOUNTER
Patient's  said that his wife is having tremendous pain  He states that he had some 50mcg of fentanyl and he cuts it in half  He requests that she placed back on fentanyl patch 25mcg as it helped with her pain  He was advised that he should not cut the patch in half  He verbalized understanding  He asks if the percocet can be given up to 4x/day  He will bring in the current prescription dated for 2/22/19 and I will revise it to 4x/day - percocet 5/325mg po 4x/day (cut back from prior dosage of 7 5mg) and provide her with fentanyl patch 25mcg q72hrs  Patient's  stated that his wife is very weak and has not been out of bed for 1 week and that he will try to get her in for her appointment next week Monday       He will  prescriptions tomorrow 2/13/19

## 2019-02-16 ENCOUNTER — TELEPHONE (OUTPATIENT)
Dept: OTHER | Facility: OTHER | Age: 79
End: 2019-02-16

## 2019-02-16 NOTE — TELEPHONE ENCOUNTER
Son Dr Lawson Standing is calling to speak with Dr Antione Roland regarding his mother's care  He may be reached on his cell phone [phone]

## 2019-02-18 ENCOUNTER — TELEPHONE (OUTPATIENT)
Dept: PAIN MEDICINE | Facility: CLINIC | Age: 79
End: 2019-02-18

## 2019-02-18 ENCOUNTER — TELEPHONE (OUTPATIENT)
Dept: FAMILY MEDICINE CLINIC | Facility: CLINIC | Age: 79
End: 2019-02-18

## 2019-02-18 DIAGNOSIS — K55.9 ISCHEMIC COLITIS (HCC): ICD-10-CM

## 2019-02-18 DIAGNOSIS — Z79.891 LONG-TERM CURRENT USE OF OPIATE ANALGESIC: ICD-10-CM

## 2019-02-18 DIAGNOSIS — G25.9 MOVEMENT DISORDER: ICD-10-CM

## 2019-02-18 DIAGNOSIS — G89.4 CHRONIC PAIN SYNDROME: ICD-10-CM

## 2019-02-18 DIAGNOSIS — F41.9 ANXIETY: ICD-10-CM

## 2019-02-18 DIAGNOSIS — I71.4 ABDOMINAL AORTIC ANEURYSM (AAA) WITHOUT RUPTURE (HCC): Primary | ICD-10-CM

## 2019-02-18 DIAGNOSIS — J44.9 CHRONIC OBSTRUCTIVE PULMONARY DISEASE, UNSPECIFIED COPD TYPE (HCC): ICD-10-CM

## 2019-02-18 RX ORDER — NALOXONE HYDROCHLORIDE 4 MG/.1ML
SPRAY NASAL
Qty: 1 EACH | Refills: 1 | Status: SHIPPED | OUTPATIENT
Start: 2019-02-18

## 2019-02-18 RX ORDER — OXYCODONE HYDROCHLORIDE AND ACETAMINOPHEN 5; 325 MG/1; MG/1
1 TABLET ORAL EVERY 8 HOURS PRN
Qty: 90 TABLET | Refills: 0 | Status: SHIPPED | OUTPATIENT
Start: 2019-03-01 | End: 2019-03-20

## 2019-02-18 RX ORDER — OXYCODONE AND ACETAMINOPHEN 10; 325 MG/1; MG/1
1 TABLET ORAL EVERY 8 HOURS PRN
Qty: 90 TABLET | Refills: 0 | Status: SHIPPED | OUTPATIENT
Start: 2019-03-01 | End: 2019-05-15 | Stop reason: ALTCHOICE

## 2019-02-18 RX ORDER — OXYCODONE AND ACETAMINOPHEN 10; 325 MG/1; MG/1
1 TABLET ORAL EVERY 8 HOURS PRN
Qty: 90 TABLET | Refills: 0 | Status: SHIPPED | OUTPATIENT
Start: 2019-02-18 | End: 2019-02-18 | Stop reason: SDUPTHER

## 2019-02-18 NOTE — PROGRESS NOTES
Patient was not able to make her appointment today  Patient's  (the primary care taker of patient) came into the office today to discuss with me regarding his wife's health status  He states that she is very weak and frail and has not gotten out of bed over the past week  He brought in today a note from his son who is a physician in [place]  Details of the note scanned in chart  Patient currently has a large aortic abdominal aneurysm which is 6 cm  As per her , Dr Salvador Juárez the Vasular surgeon refused to operate at this time due to the risk  Her  is concerned that this is one of the major contributing factors to her pain  Patient is currently on fentanyl patch 25 mcg and oxycodone acetaminophen 5/325 mg 4 times a day  He states that this is not touching her pain  He requests for an increase in the dosage of oxycodone  I advised that I am okay going up on the pain medication for patient's comfort  However, I am concerned of worsening respiratory depression and constipation especially in the setting of an aneurysm extending into the NADIYA  Patient's  states that the aneurysm may burst at anytime now and that patient is aware of that and that he just wants her to be comfortable  Today, I informed him that he should attempt to bring her in for her next appointment as it is necessary that I see her before a refill is dispensed  She was given a 30 day supply of oxycodone acetaminophen 5/325 mg approximately 3 days ago  I informed patient's  that patient may double up moving forward - 2 tabs of oxycodone -acetaminophen 5/325 mg p o  T i d  For better pain coverage  She should have sufficient tablets for 12 days supply  I will provide another prescription for oxycodone acetaminophen 10/325 mg to be filled on March 1, 2019  In the interim fentanyl patch 25mcg should be continue   Also naloxone spray was refilled and instructions were provided how it is to be used as a rescue medication in the event of overdose  Stool softners encouraged to combat constipation  Patient has a follow-up appointment to see me back in 3 weeks

## 2019-02-18 NOTE — TELEPHONE ENCOUNTER
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) who is in agreement  Please arrange asap  Slip in emr

## 2019-02-18 NOTE — TELEPHONE ENCOUNTER
Spoke with pt's  Marcel Mejia after receiving letter from pt's son who is a family medicine doc in [place]  According to pt's , son has sent out this letter to Dr Marco Alves to see if pt is a candidate for endovascular repair of her SMA stenosis r/t her persistent abdominal pains from ischemic colitis  They are aware of the severity of her medical problems and realize that this will not solve all her problems but possibly alleviate abdominal pains  They still wish hospice to be involved  Hospice initiated as pt may still not be a candidate for endovascular repair of the SMA

## 2019-02-18 NOTE — TELEPHONE ENCOUNTER
As long as the order is in her chart Hospice will call patient to start process   Will keep track in chart to make sure

## 2019-02-19 ENCOUNTER — TELEPHONE (OUTPATIENT)
Dept: FAMILY MEDICINE CLINIC | Facility: CLINIC | Age: 79
End: 2019-02-19

## 2019-02-20 NOTE — TELEPHONE ENCOUNTER
I spoke with her son   Isma Williamson who says that she has been having more weight loss, post prandial pain and believes that her symptoms could be related to chronic mesenteric ischemia  Please schedule her for OV with me to discuss angiogram and possible treatment of the SMA stenosis  Thanks

## 2019-02-20 NOTE — TELEPHONE ENCOUNTER
I spoke with her son   Dwayne Rayonight who says that she has been having more weight loss, post prandial pain and believes that her symptoms could be related to chronic mesenteric ischemia  We will schedule her for OV with me to discuss angiogram and possible treatment of the SMA stenosis

## 2019-02-21 NOTE — TELEPHONE ENCOUNTER
Called back [facility]  Nika Pozo wants to speak with me but she is sleeping  Advised them to call me at noon today  Please let everyone know to put the call through to me  Thanks  Spoke with pt and her   She was told by hospice that she would no longer be able to see anyone if they took her on  She has decided to try to pursue f/u with Dr Kera Mendoza  They are in the process of arranging appt

## 2019-03-01 ENCOUNTER — TELEPHONE (OUTPATIENT)
Dept: VASCULAR SURGERY | Facility: CLINIC | Age: 79
End: 2019-03-01

## 2019-03-01 NOTE — TELEPHONE ENCOUNTER
Patient's  called requesting info on follow up from the [facility] and if we are moving forward with surgery  Informed him Dr Janice Magana would like to set up an OV to discuss with them and transferred to scheduling

## 2019-03-20 ENCOUNTER — OFFICE VISIT (OUTPATIENT)
Dept: PAIN MEDICINE | Facility: CLINIC | Age: 79
End: 2019-03-20
Payer: MEDICARE

## 2019-03-20 VITALS
WEIGHT: 80 LBS | SYSTOLIC BLOOD PRESSURE: 134 MMHG | RESPIRATION RATE: 14 BRPM | BODY MASS INDEX: 15.71 KG/M2 | HEIGHT: 60 IN | DIASTOLIC BLOOD PRESSURE: 76 MMHG | HEART RATE: 87 BPM

## 2019-03-20 DIAGNOSIS — Z79.891 LONG-TERM CURRENT USE OF OPIATE ANALGESIC: ICD-10-CM

## 2019-03-20 DIAGNOSIS — F11.20 UNCOMPLICATED OPIOID DEPENDENCE (HCC): Primary | ICD-10-CM

## 2019-03-20 DIAGNOSIS — G89.4 CHRONIC PAIN SYNDROME: ICD-10-CM

## 2019-03-20 PROCEDURE — 99214 OFFICE O/P EST MOD 30 MIN: CPT | Performed by: ANESTHESIOLOGY

## 2019-03-20 RX ORDER — FENTANYL 25 UG/H
PATCH TRANSDERMAL
Qty: 10 PATCH | Refills: 0 | Status: SHIPPED | OUTPATIENT
Start: 2019-04-28 | End: 2019-03-20 | Stop reason: SDUPTHER

## 2019-03-20 RX ORDER — OXYCODONE AND ACETAMINOPHEN 7.5; 325 MG/1; MG/1
1 TABLET ORAL EVERY 8 HOURS PRN
Qty: 69 TABLET | Refills: 0 | Status: SHIPPED | OUTPATIENT
Start: 2019-03-20 | End: 2019-03-20 | Stop reason: SDUPTHER

## 2019-03-20 RX ORDER — OXYCODONE AND ACETAMINOPHEN 7.5; 325 MG/1; MG/1
1 TABLET ORAL EVERY 8 HOURS PRN
Qty: 69 TABLET | Refills: 0 | Status: SHIPPED | OUTPATIENT
Start: 2019-03-29 | End: 2019-03-20 | Stop reason: SDUPTHER

## 2019-03-20 RX ORDER — OXYCODONE AND ACETAMINOPHEN 7.5; 325 MG/1; MG/1
1 TABLET ORAL EVERY 8 HOURS PRN
Qty: 69 TABLET | Refills: 0 | Status: SHIPPED | OUTPATIENT
Start: 2019-04-28 | End: 2019-04-12

## 2019-03-20 RX ORDER — FENTANYL 25 UG/H
PATCH TRANSDERMAL
Qty: 10 PATCH | Refills: 0 | Status: SHIPPED | OUTPATIENT
Start: 2019-04-22 | End: 2019-05-21 | Stop reason: SDUPTHER

## 2019-03-20 NOTE — PROGRESS NOTES
Assessment:  1  Uncomplicated opioid dependence ([facility] [address] )     2  Long-term current use of opiate analgesic  oxyCODONE-acetaminophen (PERCOCET) 7 5-325 MG per tablet    DISCONTINUED: oxyCODONE-acetaminophen (PERCOCET) 7 5-325 MG per tablet    DISCONTINUED: oxyCODONE-acetaminophen (PERCOCET) 7 5-325 MG per tablet   3  Chronic pain syndrome  oxyCODONE-acetaminophen (PERCOCET) 7 5-325 MG per tablet    fentaNYL (DURAGESIC) 25 mcg/hr    DISCONTINUED: oxyCODONE-acetaminophen (PERCOCET) 7 5-325 MG per tablet    DISCONTINUED: oxyCODONE-acetaminophen (PERCOCET) 7 5-325 MG per tablet    DISCONTINUED: fentaNYL (DURAGESIC) 25 mcg/hr       Plan:  The patient is a 75-year-old female with a history of chronic pain syndrome - secondary to thoracic radiculopathy, stemming from multiple thoracic and lumbar compression fractures causing radicular symptoms   Patient is on fentanyl patch 25 mcg Q 72 hours and Oxycodone acetaminophen 10/325 mg q8hrs prn   In addition, patient takes gabapentin 300 mg p o  T i d  And Flexeril 10 mg p o  QHS   Patient's  states that patient is excessively drowsy from the oxycodone 10/325mg and would prefer a lower dosage-7 5/325mg  No aberrant behavior  Patient reports adequate pain relief   Patient is weak and has lost a lot of weight   Patient reports constipation and is on miralax which helps  Patient's  states that he has a patch of fentanyl 25mcg to be filled by Friday  He states that he has about 20 tablets of oxycodone 10/325mg left over till 1st of April  Today she will be given a script for oxycodone-acetaminophen 7 5/325mg po TID to be filled on 3/29/19 for a 3 week supply (she has 1 week left over) and another script dated for 4/28/19  She will also receive a 1 month supply of fentanyl patch dated 4/22/19  Follow up 8 weeks  There are risks associated with opioid medications, including dependence, addiction and tolerance   The patient understands and agrees to use these medications only as prescribed  Potential side effects of the medications include, but are not limited to, constipation, drowsiness, addiction, impaired judgment and risk of fatal overdose if not taken as prescribed  The patient was warned against driving while taking sedation medications  Sharing medications is a felony  At this point in time, the patient is showing no signs of addiction, abuse, diversion or suicidal ideation  [place] Prescription Drug Monitoring Program report was reviewed and was appropriate     Patient was cautioned regarding etoh use  My impressions and treatment recommendations were discussed in detail with the patient who verbalized understanding and had no further questions  Discharge instructions were provided  I personally saw and examined the patient and I agree with the above discussed plan of care  History of Present Illness:  Leyda Ledbetter is a 66 y o  female who presents for a follow up office visit in regards to Back Pain  The patients current symptoms include intermittent, low back pain which is pressure-like in nature  Patient reports 7/10 pain  Patient is currently on fentanyl patch 25 mcg, oxycodone-acetaminophen 10/325 mg p o  T i d   Patient also reports recent weight loss  She is currently 80 lb  Patient is very weak and has difficulty performing her ADLs  Prescription for fentanyl patch was filled on February 19, 2019 and a prescription for oxycodone acetaminophen 10/325 mg was filled on March 1, 2019  Patient is on clonazepam 0 5 mg  Patient was advised that the combination of opiates and benzodiazepine increases the risk for respiratory depression and death  Also concerning, her most urine drug screen was positive for alcohol  I spoke to patient and her  and cautioned regarding the use of alcohol while on opiates and benzodiazepine  They verbalized understanding      Urine drug screen November 30, 2018  Opiate contract due to be signed today     I have personally reviewed and/or updated the patient's past medical history, past surgical history, family history, social history, current medications, allergies, and vital signs today  Review of Systems   Respiratory: Negative for shortness of breath  Cardiovascular: Negative for chest pain  Gastrointestinal: Positive for constipation  Negative for diarrhea, nausea and vomiting  Musculoskeletal: Positive for gait problem  Negative for arthralgias, joint swelling and myalgias  Skin: Negative for rash  Neurological: Negative for dizziness, seizures and weakness  All other systems reviewed and are negative        Patient Active Problem List   Diagnosis    Abdominal pain    Abnormal involuntary movements    Cataract, bilateral    Chronic pain syndrome    Closed wedge fracture of thoracic vertebra with delayed healing    COPD, mild (HCC)    Generalized weakness    HTN (hypertension)    Hyponatremia    Irritable bowel syndrome without diarrhea    Osteoporosis    Thoracic radiculopathy    Closed fracture of third lumbar vertebra with routine healing    Long-term current use of opiate analgesic    Uncomplicated opioid dependence ([facility] [address] )    Movement disorder    Abdominal aortic aneurysm (AAA) without rupture ([address] )    Tobacco abuse    Anxiety       Past Medical History:   Diagnosis Date    Abdominal aortic aneurysm (AAA) 3 0 cm to 5 0 cm in diameter in female ([address] ) 06/08/2018    4 9 cm     Anxiety     Chronic obstructive lung disease (HCC)     Compression fracture of thoracic vertebra (HCC)     last assessed 2/20/17    Depression     Dysthymia     Failure to thrive in adult     Fracture of lumbar vertebra (HCC)     Hypertension     benign essential    Hyponatremia        Past Surgical History:   Procedure Laterality Date    APPENDECTOMY      HYSTERECTOMY         Family History   Problem Relation Age of Onset    Cirrhosis Mother         hepatic    Bone cancer Father Social History     Occupational History    Not on file   Tobacco Use    Smoking status: Current Every Day Smoker    Smokeless tobacco: Never Used   Substance and Sexual Activity    Alcohol use: Yes     Comment: conflicting both occasionally an no use per [employer]    Drug use: No    Sexual activity: Not on file       Current Outpatient Medications on File Prior to Visit   Medication Sig    aspirin 81 MG tablet Take by mouth    Calcium Carb-Cholecalciferol (CALCIUM 600 + D) 600-200 MG-UNIT TABS Take by mouth    clonazePAM (KlonoPIN) 0 5 MG disintegrating tablet Take 1 tablet (0 5 mg total) by mouth 2 (two) times a day    fentaNYL (DURAGESIC) 25 mcg/hr Place 1 patch q72hrs    gabapentin (NEURONTIN) 300 mg capsule TAKE 1 CAPSULE THREE TIMES A DAY (Patient taking differently: TAKE 1 CAPSULE TWO TIMES A DAY)    Multiple Vitamins-Minerals (MULTIVITAMIN ADULT PO) Take by mouth    Naloxone HCl 4 MG/0 1ML LIQD Apply 1 spray in each nostril in the event of overdose from pain medication      oxyCODONE-acetaminophen (PERCOCET)  mg per tablet Take 1 tablet by mouth every 8 (eight) hours as needed for moderate pain for up to 30 daysMax Daily Amount: 3 tablets    Probiotic Product (ALIGN) 4 MG CAPS     Psyllium (METAMUCIL) 28 3 % POWD Take by mouth    traZODone (DESYREL) 50 mg tablet Take 1 tablet (50 mg total) by mouth daily at bedtime    docusate sodium (COLACE) 100 mg capsule Take 1 capsule (100 mg total) by mouth 2 (two) times a day for 30 days    gabapentin (NEURONTIN) 300 mg capsule Take 1 capsule (300 mg total) by mouth 3 (three) times a day for 30 days    [DISCONTINUED] cyclobenzaprine (FLEXERIL) 10 mg tablet Take 1 tablet (10 mg total) by mouth daily at bedtime for 30 days    [DISCONTINUED] oxyCODONE-acetaminophen (PERCOCET) 5-325 mg per tablet Take 1 tablet by mouth every 8 (eight) hours as needed for moderate pain for up to 30 daysMax Daily Amount: 3 tablets     No current facility-administered medications on file prior to visit  No Known Allergies    Physical Exam:    /76   Pulse 87   Resp 14   Ht 5' (1 524 m)   Wt 36 3 kg (80 lb)   BMI 15 62 kg/m²     Constitutional:normal, well developed, well nourished, alert, in no distress and non-toxic and no overt pain behavior    Eyes:anicteric  HEENT:grossly intact  Neck:supple, symmetric, trachea midline and no masses   Pulmonary:even and unlabored  Cardiovascular:No edema or pitting edema present  Skin:Normal without rashes or lesions and well hydrated  Psychiatric:Mood and affect appropriate  Neurologic:Cranial Nerves II-XII grossly intact  Musculoskeletal:in wheelchair    Imaging

## 2019-03-25 ENCOUNTER — OFFICE VISIT (OUTPATIENT)
Dept: VASCULAR SURGERY | Facility: CLINIC | Age: 79
End: 2019-03-25
Payer: MEDICARE

## 2019-03-25 VITALS
HEIGHT: 61 IN | TEMPERATURE: 98 F | SYSTOLIC BLOOD PRESSURE: 122 MMHG | RESPIRATION RATE: 18 BRPM | HEART RATE: 88 BPM | DIASTOLIC BLOOD PRESSURE: 68 MMHG | BODY MASS INDEX: 15.12 KG/M2

## 2019-03-25 DIAGNOSIS — I71.4 ABDOMINAL AORTIC ANEURYSM (AAA) WITHOUT RUPTURE (HCC): ICD-10-CM

## 2019-03-25 DIAGNOSIS — R10.9 ABDOMINAL PAIN, UNSPECIFIED ABDOMINAL LOCATION: ICD-10-CM

## 2019-03-25 DIAGNOSIS — K55.1 SMA STENOSIS: Primary | ICD-10-CM

## 2019-03-25 PROCEDURE — 99215 OFFICE O/P EST HI 40 MIN: CPT | Performed by: SURGERY

## 2019-03-25 NOTE — ASSESSMENT & PLAN NOTE
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

## 2019-03-25 NOTE — LETTER
March 25, 2019     Margy Pastrana, [address] [address]    Patient: Jessie Ortiz   YOB: 1940   Date of Visit: 3/25/2019       Dear Dr Harmony Barrios:    Thank you for referring Boyd Avila to me for evaluation  Below are my notes for this consultation  If you have questions, please do not hesitate to call me  I look forward to following your patient along with you  Sincerely,        Rajeev Slater MD        CC: MD Rajeev Potter MD  3/25/2019  5:18 PM  Sign at close encounter  Assessment/Plan:    Abdominal aortic aneurysm (AAA) without rupture ([address] )  Complex anatomy due to severe scoliosis of her spine the measurements are not accurate as well  She has a large NADIYA and SMA stenosis  She also has very small iliacs making endovascular repair high risk  Lower pole renal artery will also likely get covered which will worsen her chronic kidney disease  I discussed this with the patient, her daughter who is a nursing background and also on the phone with her son who is a physician in [facility])  At this point I recommend that we continue follow-up with serial ultrasounds  Patient was also not too keen on having aortic aneurysm repair procedure performed  She is not a candidate for open surgical repair due to very frail status and multiple medical comorbidities  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HCC)  Celiac and NADIYA are widely patent but SMA has moderate stenosis  She has lost significant amount of weight  Although patient is not forthcoming according to the patient's son she is having postprandial abdominal pain and has lost significant amount of weight  She has been on chronic narcotics for chronic abdominal pain  At this point is reasonable to consider angiography with possible stenting of the superior mesenteric artery    This would have to be done under general anesthesia as patient has a lot of involuntary movements which would preclude safe the performing this procedure just with intravenous sedation  Will plan to schedule this in future  Risks of the procedure such as bleeding, dissection and thrombosis of the SMA and distal embolization causing gut ischemia were discussed with patient and  today  Also stressed the importance of continued smoking cessation as smoking can diminish stent patency  Abdominal pain  All multifactorial   On chronic opiates  There is a concern that chronic mesenteric ischemia might be playing a role in this  I discussed this with the patient and her   I am not entirely sure but since she has lost it was quite a bit of weight and has poor appetite and 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 it is worthwhile to consider treating this  In spite of successful treatment there is a chance her symptoms may not improve  Patient and  understand this  She would like some time to think over things but has consented for the procedure  She would like to proceed in late April as her family is visiting her in April  Diagnoses and all orders for this visit:    SMA stenosis [facility])  -     Case request operating room: ARTERIOGRAM, mesenteric angiogram with possible mesenteric artery stenting  Either in hybrid room or in interventional radiology suite at [place]  Anesthesia for intravenous sedation ; Standing  -     Basic metabolic panel; Future  -     CBC and Platelet; Future  -     Protime-INR; Future  -     EKG 12 lead; Future  -     XR chest pa & lateral; Future  -     Case request operating room: ARTERIOGRAM, mesenteric angiogram with possible mesenteric artery stenting  Either in hybrid room or in interventional radiology suite at [place]  Anesthesia for intravenous sedation      Abdominal pain, unspecified abdominal location  -     Case request operating room: ARTERIOGRAM, mesenteric angiogram with possible mesenteric artery stenting  Either in hybrid room or in interventional radiology suite at [facility]  Anesthesia for intravenous sedation ; Standing  -     Case request operating room: ARTERIOGRAM, mesenteric angiogram with possible mesenteric artery stenting  Either in hybrid room or in interventional radiology suite at [facility]  Anesthesia for intravenous sedation  Abdominal aortic aneurysm (AAA) without rupture [facility])  -     Case request operating room: ARTERIOGRAM, mesenteric angiogram with possible mesenteric artery stenting  Either in hybrid room or in interventional radiology suite at [facility]  Anesthesia for intravenous sedation ; Standing  -     Case request operating room: ARTERIOGRAM, mesenteric angiogram with possible mesenteric artery stenting  Either in hybrid room or in interventional radiology suite at [facility]  Anesthesia for intravenous sedation  Other orders  -     Diet NPO; Sips with meds; Standing  -     Void on call to OR; Standing  -     Insert peripheral IV; Standing  -     Shower/scrub; Standing  -     Place sequential compression device; Standing          Subjective:      Patient ID: Isaura Godoy is a 66 y o  female  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  Over the past 2 years she has lost about 20 lb of weight  She has had extensive workup  She has been on chronic opiates for abdominal pain  Review of CT scan and discussion with patient's son was a physician was performed  She has SMA stenosis but celiac and NADIYA are patent  There is some concern about chronic mesenteric ischemia as 1 of the causes of her chronic abdominal pain and weight loss  Although the patient specifically denies any postprandial pain she is not a reliable historian  She has loss of appetite  Since January she has quit smoking  Since then her appetite has improved slightly and food tastes better  But she has not gain any significant weight        The following portions of the patient's history were reviewed and updated as appropriate: allergies, current medications, past family history, past medical history, past social history, past surgical history and problem list     Review of Systems   Constitutional: Positive for appetite change  HENT: Negative  Eyes: Negative  Respiratory: Positive for shortness of breath  Cardiovascular: Negative  Gastrointestinal: Positive for abdominal pain  Endocrine: Negative  Genitourinary: Negative  Musculoskeletal: Positive for back pain and gait problem  Skin: Negative  Allergic/Immunologic: Negative  Neurological: Positive for weakness  Hematological: Negative  Psychiatric/Behavioral: Negative  I have reviewed the review of systems as entered and made appropriate changes as necessary    Objective:      /68 (BP Location: Left arm)   Pulse 88   Temp 98 °F (36 7 °C)   Resp 18   Ht 5' 1" (1 549 m)   BMI 15 12 kg/m²           Physical Exam   Constitutional: She is oriented to person, place, and time  She appears well-developed  She appears cachectic  No distress  Frail appearing   HENT:   Head: Normocephalic and atraumatic  Right Ear: External ear normal    Left Ear: External ear normal    Mouth/Throat: Oropharynx is clear and moist  No oropharyngeal exudate  Eyes: Conjunctivae are normal    Neck: Normal range of motion  Cardiovascular: Normal rate and regular rhythm  Exam reveals no gallop and no friction rub  No murmur heard  Pulmonary/Chest: Effort normal  No respiratory distress  She has no wheezes  Abdominal: Soft  She exhibits no distension and no mass  There is no tenderness  There is no rebound and no guarding  Musculoskeletal: She exhibits deformity (This kyphoscoliosis)  She exhibits no edema  Wheelchair bound   Neurological: She is alert and oriented to person, place, and time  Coordination abnormal    Abnormal involuntary movements   Skin: Skin is warm and dry  Capillary refill takes less than 2 seconds  No rash noted   She is not diaphoretic  No erythema  No pallor  Psychiatric: She has a normal mood and affect  Nursing note and vitals reviewed

## 2019-03-25 NOTE — ASSESSMENT & PLAN NOTE
Complex anatomy due to severe scoliosis of her spine the measurements are not accurate as well  She has a large NADIYA and SMA stenosis  She also has very small iliacs making endovascular repair high risk  Lower pole renal artery will also likely get covered which will worsen her chronic kidney disease  I discussed this with the patient, her daughter who is a nursing background and also on the phone with her son who is a physician in [facility])  At this point I recommend that we continue follow-up with serial ultrasounds  Patient was also not too keen on having aortic aneurysm repair procedure performed  She is not a candidate for open surgical repair due to very frail status and multiple medical comorbidities

## 2019-03-25 NOTE — PROGRESS NOTES
Assessment/Plan:    Abdominal aortic aneurysm (AAA) without rupture (HCC)  Complex anatomy due to severe scoliosis of her spine the measurements are not accurate as well  She has a large NADIYA and SMA stenosis  She also has very small iliacs making endovascular repair high risk  Lower pole renal artery will also likely get covered which will worsen her chronic kidney disease  I discussed this with the patient, her daughter who is a nursing background and also on the phone with her son who is a physician in [facility])  At this point I recommend that we continue follow-up with serial ultrasounds  Patient was also not too keen on having aortic aneurysm repair procedure performed  She is not a candidate for open surgical repair due to very frail status and multiple medical comorbidities  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HCC)  Celiac and NADIYA are widely patent but SMA has moderate stenosis  She has lost significant amount of weight  Although patient is not forthcoming according to the patient's son she is having postprandial abdominal pain and has lost significant amount of weight  She has been on chronic narcotics for chronic abdominal pain  At this point is reasonable to consider angiography with possible stenting of the superior mesenteric artery  This would have to be done under general anesthesia as patient has a lot of involuntary movements which would preclude safe the performing this procedure just with intravenous sedation  Will plan to schedule this in future  Risks of the procedure such as bleeding, dissection and thrombosis of the SMA and distal embolization causing gut ischemia were discussed with patient and  today  Also stressed the importance of continued smoking cessation as smoking can diminish stent patency  Abdominal pain  All multifactorial   On chronic opiates  There is a concern that chronic mesenteric ischemia might be playing a role in this  I discussed this with the patient and her   I am not entirely sure but since she has lost it was quite a bit of weight and has poor appetite and 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 it is worthwhile to consider treating this  In spite of successful treatment there is a chance her symptoms may not improve  Patient and  understand this  She would like some time to think over things but has consented for the procedure  She would like to proceed in late April as her family is visiting her in April  Diagnoses and all orders for this visit:    SMA stenosis [facility])  -     Case request operating room: ARTERIOGRAM, mesenteric angiogram with possible mesenteric artery stenting  Either in hybrid room or in interventional radiology suite at [facility] for intravenous sedation ; Standing  -     Basic metabolic panel; Future  -     CBC and Platelet; Future  -     Protime-INR; Future  -     EKG 12 lead; Future  -     XR chest pa & lateral; Future  -     Case request operating room: ARTERIOGRAM, mesenteric angiogram with possible mesenteric artery stenting  Either in hybrid room or in interventional radiology suite at [facility] for intravenous sedation  Abdominal pain, unspecified abdominal location  -     Case request operating room: ARTERIOGRAM, mesenteric angiogram with possible mesenteric artery stenting  Either in hybrid room or in interventional radiology suite at [facility] for intravenous sedation ; Standing  -     Case request operating room: ARTERIOGRAM, mesenteric angiogram with possible mesenteric artery stenting  Either in hybrid room or in interventional radiology suite at [facility] for intravenous sedation  Abdominal aortic aneurysm (AAA) without rupture [facility])  -     Case request operating room: ARTERIOGRAM, mesenteric angiogram with possible mesenteric artery stenting  Either in hybrid room or in interventional radiology suite at [facility] for intravenous sedation ; Standing  - Case request operating room: ARTERIOGRAM, mesenteric angiogram with possible mesenteric artery stenting  Either in hybrid room or in interventional radiology suite at [facility]  Anesthesia for intravenous sedation  Other orders  -     Diet NPO; Sips with meds; Standing  -     Void on call to OR; Standing  -     Insert peripheral IV; Standing  -     Shower/scrub; Standing  -     Place sequential compression device; Standing          Subjective:      Patient ID: Leyda Ledbetter is a 66 y o  female  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  Over the past 2 years she has lost about 20 lb of weight  She has had extensive workup  She has been on chronic opiates for abdominal pain  Review of CT scan and discussion with patient's son was a physician was performed  She has SMA stenosis but celiac and NADIYA are patent  There is some concern about chronic mesenteric ischemia as 1 of the causes of her chronic abdominal pain and weight loss  Although the patient specifically denies any postprandial pain she is not a reliable historian  She has loss of appetite  Since January she has quit smoking  Since then her appetite has improved slightly and food tastes better  But she has not gain any significant weight  The following portions of the patient's history were reviewed and updated as appropriate: allergies, current medications, past family history, past medical history, past social history, past surgical history and problem list     Review of Systems   Constitutional: Positive for appetite change  HENT: Negative  Eyes: Negative  Respiratory: Positive for shortness of breath  Cardiovascular: Negative  Gastrointestinal: Positive for abdominal pain  Endocrine: Negative  Genitourinary: Negative  Musculoskeletal: Positive for back pain and gait problem  Skin: Negative  Allergic/Immunologic: Negative  Neurological: Positive for weakness  Hematological: Negative  Psychiatric/Behavioral: Negative  I have reviewed the review of systems as entered and made appropriate changes as necessary    Objective:      /68 (BP Location: Left arm)   Pulse 88   Temp 98 °F (36 7 °C)   Resp 18   Ht 5' 1" (1 549 m)   BMI 15 12 kg/m²          Physical Exam   Constitutional: She is oriented to person, place, and time  She appears well-developed  She appears cachectic  No distress  Frail appearing   HENT:   Head: Normocephalic and atraumatic  Right Ear: External ear normal    Left Ear: External ear normal    Mouth/Throat: Oropharynx is clear and moist  No oropharyngeal exudate  Eyes: Conjunctivae are normal    Neck: Normal range of motion  Cardiovascular: Normal rate and regular rhythm  Exam reveals no gallop and no friction rub  No murmur heard  Pulmonary/Chest: Effort normal  No respiratory distress  She has no wheezes  Abdominal: Soft  She exhibits no distension and no mass  There is no tenderness  There is no rebound and no guarding  Musculoskeletal: She exhibits deformity (This kyphoscoliosis)  She exhibits no edema  Wheelchair bound   Neurological: She is alert and oriented to person, place, and time  Coordination abnormal    Abnormal involuntary movements   Skin: Skin is warm and dry  Capillary refill takes less than 2 seconds  No rash noted  She is not diaphoretic  No erythema  No pallor  Psychiatric: She has a normal mood and affect  Nursing note and vitals reviewed

## 2019-03-25 NOTE — ASSESSMENT & PLAN NOTE
Celiac and NADIYA are widely patent but SMA has moderate stenosis  She has lost significant amount of weight  Although patient is not forthcoming according to the patient's son she is having postprandial abdominal pain and has lost significant amount of weight  She has been on chronic narcotics for chronic abdominal pain  At this point is reasonable to consider angiography with possible stenting of the superior mesenteric artery  This would have to be done under general anesthesia as patient has a lot of involuntary movements which would preclude safe the performing this procedure just with intravenous sedation  Will plan to schedule this in future  Risks of the procedure such as bleeding, dissection and thrombosis of the SMA and distal embolization causing gut ischemia were discussed with patient and  today  Also stressed the importance of continued smoking cessation as smoking can diminish stent patency

## 2019-04-01 ENCOUNTER — TELEPHONE (OUTPATIENT)
Dept: VASCULAR SURGERY | Facility: CLINIC | Age: 79
End: 2019-04-01

## 2019-04-01 ENCOUNTER — PREP FOR PROCEDURE (OUTPATIENT)
Dept: VASCULAR SURGERY | Facility: CLINIC | Age: 79
End: 2019-04-01

## 2019-04-02 ENCOUNTER — TRANSCRIBE ORDERS (OUTPATIENT)
Dept: LAB | Facility: HOSPITAL | Age: 79
End: 2019-04-02

## 2019-04-02 DIAGNOSIS — I77.1 STRICTURE OF ARTERY (HCC): Primary | ICD-10-CM

## 2019-04-10 ENCOUNTER — HOSPITAL ENCOUNTER (OUTPATIENT)
Dept: RADIOLOGY | Facility: HOSPITAL | Age: 79
Discharge: HOME | End: 2019-04-10
Attending: SURGERY
Payer: MEDICARE

## 2019-04-10 ENCOUNTER — OFFICE VISIT (OUTPATIENT)
Dept: LAB | Facility: HOSPITAL | Age: 79
End: 2019-04-10
Attending: SURGERY
Payer: MEDICARE

## 2019-04-10 DIAGNOSIS — K55.1 SMA STENOSIS: ICD-10-CM

## 2019-04-10 LAB
ATRIAL RATE: 108 BPM
P AXIS: 64 DEGREES
PR INTERVAL: 130 MS
QRS AXIS: 25 DEGREES
QRSD INTERVAL: 80 MS
QT INTERVAL: 314 MS
QTC INTERVAL: 420 MS
T WAVE AXIS: 69 DEGREES
VENTRICULAR RATE: 108 BPM

## 2019-04-10 PROCEDURE — 71046 X-RAY EXAM CHEST 2 VIEWS: CPT

## 2019-04-10 PROCEDURE — 93005 ELECTROCARDIOGRAM TRACING: CPT

## 2019-04-10 PROCEDURE — 93010 ELECTROCARDIOGRAM REPORT: CPT | Performed by: INTERNAL MEDICINE

## 2019-04-11 ENCOUNTER — APPOINTMENT (OUTPATIENT)
Dept: LAB | Facility: HOSPITAL | Age: 79
End: 2019-04-11
Attending: SURGERY
Payer: MEDICARE

## 2019-04-11 DIAGNOSIS — I77.1 STRICTURE OF ARTERY (HCC): ICD-10-CM

## 2019-04-11 DIAGNOSIS — K55.1 SMA STENOSIS: ICD-10-CM

## 2019-04-11 LAB
ANION GAP SERPL CALCULATED.3IONS-SCNC: 6 MMOL/L (ref 4–13)
BUN SERPL-MCNC: 11 MG/DL (ref 5–25)
CALCIUM SERPL-MCNC: 9.8 MG/DL (ref 8.3–10.1)
CHLORIDE SERPL-SCNC: 101 MMOL/L (ref 100–108)
CO2 SERPL-SCNC: 29 MMOL/L (ref 21–32)
CREAT SERPL-MCNC: 0.91 MG/DL (ref 0.6–1.3)
ERYTHROCYTE [DISTWIDTH] IN BLOOD BY AUTOMATED COUNT: 13.2 % (ref 11.6–15.1)
GFR SERPL CREATININE-BSD FRML MDRD: 61 ML/MIN/1.73SQ M
GLUCOSE SERPL-MCNC: 56 MG/DL (ref 65–140)
HCT VFR BLD AUTO: 37.6 % (ref 34.8–46.1)
HGB BLD-MCNC: 11.7 G/DL (ref 11.5–15.4)
INR PPP: 1.05 (ref 0.86–1.17)
MCH RBC QN AUTO: 30.2 PG (ref 26.8–34.3)
MCHC RBC AUTO-ENTMCNC: 31.1 G/DL (ref 31.4–37.4)
MCV RBC AUTO: 97 FL (ref 82–98)
PLATELET # BLD AUTO: 593 THOUSANDS/UL (ref 149–390)
PMV BLD AUTO: 9 FL (ref 8.9–12.7)
POTASSIUM SERPL-SCNC: 4.4 MMOL/L (ref 3.5–5.3)
PROTHROMBIN TIME: 13.8 SECONDS (ref 11.8–14.2)
RBC # BLD AUTO: 3.87 MILLION/UL (ref 3.81–5.12)
SODIUM SERPL-SCNC: 136 MMOL/L (ref 136–145)
VENIPUNCTURE: NORMAL
WBC # BLD AUTO: 8.61 THOUSAND/UL (ref 4.31–10.16)

## 2019-04-11 PROCEDURE — 85027 COMPLETE CBC AUTOMATED: CPT

## 2019-04-11 PROCEDURE — 36415 COLL VENOUS BLD VENIPUNCTURE: CPT

## 2019-04-11 PROCEDURE — 80048 BASIC METABOLIC PNL TOTAL CA: CPT

## 2019-04-11 PROCEDURE — 85610 PROTHROMBIN TIME: CPT

## 2019-04-15 ENCOUNTER — ANESTHESIA EVENT (OUTPATIENT)
Dept: PERIOP | Facility: HOSPITAL | Age: 79
End: 2019-04-15

## 2019-04-15 ENCOUNTER — TELEPHONE (OUTPATIENT)
Dept: VASCULAR SURGERY | Facility: CLINIC | Age: 79
End: 2019-04-15

## 2019-04-18 ENCOUNTER — TELEPHONE (OUTPATIENT)
Dept: VASCULAR SURGERY | Facility: CLINIC | Age: 79
End: 2019-04-18

## 2019-04-22 ENCOUNTER — TELEPHONE (OUTPATIENT)
Dept: VASCULAR SURGERY | Facility: CLINIC | Age: 79
End: 2019-04-22

## 2019-04-25 ENCOUNTER — TELEPHONE (OUTPATIENT)
Dept: VASCULAR SURGERY | Facility: CLINIC | Age: 79
End: 2019-04-25

## 2019-04-25 ENCOUNTER — ANESTHESIA (OUTPATIENT)
Dept: PERIOP | Facility: HOSPITAL | Age: 79
End: 2019-04-25

## 2019-04-29 DIAGNOSIS — K59.00 CONSTIPATION, UNSPECIFIED CONSTIPATION TYPE: ICD-10-CM

## 2019-04-29 RX ORDER — LACTULOSE 10 G/15ML
SOLUTION ORAL
Qty: 240 ML | Refills: 1 | Status: SHIPPED | OUTPATIENT
Start: 2019-04-29 | End: 2019-07-05 | Stop reason: SDUPTHER

## 2019-05-13 ENCOUNTER — OFFICE VISIT (OUTPATIENT)
Dept: FAMILY MEDICINE CLINIC | Facility: CLINIC | Age: 79
End: 2019-05-13
Payer: MEDICARE

## 2019-05-13 VITALS
HEIGHT: 60 IN | HEART RATE: 114 BPM | OXYGEN SATURATION: 91 % | SYSTOLIC BLOOD PRESSURE: 102 MMHG | DIASTOLIC BLOOD PRESSURE: 62 MMHG | BODY MASS INDEX: 15.24 KG/M2 | WEIGHT: 77.6 LBS

## 2019-05-13 DIAGNOSIS — R07.89 ATYPICAL CHEST PAIN: ICD-10-CM

## 2019-05-13 DIAGNOSIS — R63.4 WEIGHT LOSS: ICD-10-CM

## 2019-05-13 DIAGNOSIS — R93.89 ABNORMAL CXR: ICD-10-CM

## 2019-05-13 DIAGNOSIS — Z13.220 SCREENING, LIPID: ICD-10-CM

## 2019-05-13 DIAGNOSIS — R63.6 UNDERWEIGHT: ICD-10-CM

## 2019-05-13 DIAGNOSIS — G25.9 MOVEMENT DISORDER: ICD-10-CM

## 2019-05-13 DIAGNOSIS — J44.9 COPD, MILD (HCC): ICD-10-CM

## 2019-05-13 DIAGNOSIS — Z01.818 PREOPERATIVE CLEARANCE: Primary | ICD-10-CM

## 2019-05-13 PROCEDURE — 99214 OFFICE O/P EST MOD 30 MIN: CPT | Performed by: FAMILY MEDICINE

## 2019-05-13 RX ORDER — LACTULOSE 10 G/15ML
SOLUTION ORAL; RECTAL
Refills: 1 | COMMUNITY
Start: 2019-04-29 | End: 2019-07-05 | Stop reason: SDUPTHER

## 2019-05-14 ENCOUNTER — TELEPHONE (OUTPATIENT)
Dept: FAMILY MEDICINE CLINIC | Facility: CLINIC | Age: 79
End: 2019-05-14

## 2019-05-15 ENCOUNTER — TRANSCRIBE ORDERS (OUTPATIENT)
Dept: LAB | Facility: HOSPITAL | Age: 79
End: 2019-05-15

## 2019-05-15 ENCOUNTER — TELEPHONE (OUTPATIENT)
Dept: PAIN MEDICINE | Facility: CLINIC | Age: 79
End: 2019-05-15

## 2019-05-15 ENCOUNTER — TELEPHONE (OUTPATIENT)
Dept: VASCULAR SURGERY | Facility: CLINIC | Age: 79
End: 2019-05-15

## 2019-05-15 DIAGNOSIS — I71.4 ABDOMINAL AORTIC ANEURYSM WITHOUT RUPTURE (HCC): Primary | ICD-10-CM

## 2019-05-15 PROBLEM — Z01.818 PREOPERATIVE CLEARANCE: Status: ACTIVE | Noted: 2019-05-15

## 2019-05-15 PROCEDURE — 1124F ACP DISCUSS-NO DSCNMKR DOCD: CPT | Performed by: SURGERY

## 2019-05-16 ENCOUNTER — APPOINTMENT (OUTPATIENT)
Dept: RADIOLOGY | Facility: HOSPITAL | Age: 79
End: 2019-05-16
Payer: MEDICARE

## 2019-05-16 ENCOUNTER — ANESTHESIA (OUTPATIENT)
Dept: PERIOP | Facility: HOSPITAL | Age: 79
End: 2019-05-16
Payer: MEDICARE

## 2019-05-16 ENCOUNTER — HOSPITAL ENCOUNTER (OUTPATIENT)
Facility: HOSPITAL | Age: 79
Discharge: HOME | End: 2019-05-17
Attending: SURGERY | Admitting: SURGERY
Payer: MEDICARE

## 2019-05-16 ENCOUNTER — ANESTHESIA EVENT (OUTPATIENT)
Dept: PERIOP | Facility: HOSPITAL | Age: 79
End: 2019-05-16
Payer: MEDICARE

## 2019-05-16 DIAGNOSIS — K55.1 SMA STENOSIS: Primary | ICD-10-CM

## 2019-05-16 PROCEDURE — 36245 INS CATH ABD/L-EXT ART 1ST: CPT | Performed by: SURGERY

## 2019-05-16 PROCEDURE — 75625 CONTRAST EXAM ABDOMINL AORTA: CPT

## 2019-05-16 PROCEDURE — C1894 INTRO/SHEATH, NON-LASER: HCPCS | Performed by: SURGERY

## 2019-05-16 PROCEDURE — C1760 CLOSURE DEV, VASC: HCPCS | Performed by: SURGERY

## 2019-05-16 PROCEDURE — C1769 GUIDE WIRE: HCPCS | Performed by: SURGERY

## 2019-05-16 PROCEDURE — 76937 US GUIDE VASCULAR ACCESS: CPT

## 2019-05-16 PROCEDURE — 75726 ARTERY X-RAYS ABDOMEN: CPT | Performed by: SURGERY

## 2019-05-16 PROCEDURE — C1887 CATHETER, GUIDING: HCPCS | Performed by: SURGERY

## 2019-05-16 PROCEDURE — 37236 OPEN/PERQ PLACE STENT 1ST: CPT | Performed by: SURGERY

## 2019-05-16 PROCEDURE — C1876 STENT, NON-COA/NON-COV W/DEL: HCPCS | Performed by: SURGERY

## 2019-05-16 DEVICE — IMPLANTABLE DEVICE: Type: IMPLANTABLE DEVICE | Site: ARTERIAL | Status: FUNCTIONAL

## 2019-05-16 RX ORDER — FENTANYL 25 UG/H
1 PATCH TRANSDERMAL
Status: DISCONTINUED | OUTPATIENT
Start: 2019-05-16 | End: 2019-05-17 | Stop reason: HOSPADM

## 2019-05-16 RX ORDER — SODIUM CHLORIDE, SODIUM LACTATE, POTASSIUM CHLORIDE, CALCIUM CHLORIDE 600; 310; 30; 20 MG/100ML; MG/100ML; MG/100ML; MG/100ML
INJECTION, SOLUTION INTRAVENOUS CONTINUOUS PRN
Status: DISCONTINUED | OUTPATIENT
Start: 2019-05-16 | End: 2019-05-16

## 2019-05-16 RX ORDER — TRAZODONE HYDROCHLORIDE 50 MG/1
50 TABLET ORAL
Status: DISCONTINUED | OUTPATIENT
Start: 2019-05-16 | End: 2019-05-17 | Stop reason: HOSPADM

## 2019-05-16 RX ORDER — ONDANSETRON 2 MG/ML
INJECTION INTRAMUSCULAR; INTRAVENOUS AS NEEDED
Status: DISCONTINUED | OUTPATIENT
Start: 2019-05-16 | End: 2019-05-16 | Stop reason: SURG

## 2019-05-16 RX ORDER — SODIUM CHLORIDE, SODIUM LACTATE, POTASSIUM CHLORIDE, CALCIUM CHLORIDE 600; 310; 30; 20 MG/100ML; MG/100ML; MG/100ML; MG/100ML
50 INJECTION, SOLUTION INTRAVENOUS CONTINUOUS
Status: DISCONTINUED | OUTPATIENT
Start: 2019-05-16 | End: 2019-05-17

## 2019-05-16 RX ORDER — GABAPENTIN 300 MG/1
300 CAPSULE ORAL 2 TIMES DAILY
Status: DISCONTINUED | OUTPATIENT
Start: 2019-05-16 | End: 2019-05-17 | Stop reason: HOSPADM

## 2019-05-16 RX ORDER — DEXTROSE AND SODIUM CHLORIDE 5; .45 G/100ML; G/100ML
75 INJECTION, SOLUTION INTRAVENOUS CONTINUOUS
Status: DISCONTINUED | OUTPATIENT
Start: 2019-05-16 | End: 2019-05-16

## 2019-05-16 RX ORDER — FENTANYL CITRATE 50 UG/ML
INJECTION, SOLUTION INTRAMUSCULAR; INTRAVENOUS AS NEEDED
Status: DISCONTINUED | OUTPATIENT
Start: 2019-05-16 | End: 2019-05-16 | Stop reason: SURG

## 2019-05-16 RX ORDER — FENTANYL CITRATE/PF 50 MCG/ML
12.5 SYRINGE (ML) INJECTION
Status: DISCONTINUED | OUTPATIENT
Start: 2019-05-16 | End: 2019-05-16 | Stop reason: HOSPADM

## 2019-05-16 RX ORDER — LIDOCAINE HYDROCHLORIDE 10 MG/ML
INJECTION, SOLUTION INFILTRATION; PERINEURAL AS NEEDED
Status: DISCONTINUED | OUTPATIENT
Start: 2019-05-16 | End: 2019-05-16 | Stop reason: HOSPADM

## 2019-05-16 RX ORDER — ASPIRIN 81 MG/1
81 TABLET, CHEWABLE ORAL DAILY
Status: DISCONTINUED | OUTPATIENT
Start: 2019-05-17 | End: 2019-05-17 | Stop reason: HOSPADM

## 2019-05-16 RX ORDER — LIDOCAINE HYDROCHLORIDE 10 MG/ML
INJECTION, SOLUTION INFILTRATION; PERINEURAL AS NEEDED
Status: DISCONTINUED | OUTPATIENT
Start: 2019-05-16 | End: 2019-05-16 | Stop reason: SURG

## 2019-05-16 RX ORDER — HEPARIN SODIUM 200 [USP'U]/100ML
INJECTION, SOLUTION INTRAVENOUS
Status: COMPLETED | OUTPATIENT
Start: 2019-05-16 | End: 2019-05-16

## 2019-05-16 RX ORDER — HEPARIN SODIUM 1000 [USP'U]/ML
INJECTION, SOLUTION INTRAVENOUS; SUBCUTANEOUS AS NEEDED
Status: DISCONTINUED | OUTPATIENT
Start: 2019-05-16 | End: 2019-05-16 | Stop reason: SURG

## 2019-05-16 RX ORDER — ONDANSETRON 2 MG/ML
4 INJECTION INTRAMUSCULAR; INTRAVENOUS ONCE AS NEEDED
Status: DISCONTINUED | OUTPATIENT
Start: 2019-05-16 | End: 2019-05-16 | Stop reason: HOSPADM

## 2019-05-16 RX ORDER — SODIUM CHLORIDE, SODIUM LACTATE, POTASSIUM CHLORIDE, CALCIUM CHLORIDE 600; 310; 30; 20 MG/100ML; MG/100ML; MG/100ML; MG/100ML
125 INJECTION, SOLUTION INTRAVENOUS CONTINUOUS
Status: DISCONTINUED | OUTPATIENT
Start: 2019-05-16 | End: 2019-05-16

## 2019-05-16 RX ORDER — PROPOFOL 10 MG/ML
INJECTION, EMULSION INTRAVENOUS AS NEEDED
Status: DISCONTINUED | OUTPATIENT
Start: 2019-05-16 | End: 2019-05-16 | Stop reason: SURG

## 2019-05-16 RX ORDER — OXYCODONE HYDROCHLORIDE AND ACETAMINOPHEN 5; 325 MG/1; MG/1
1.5 TABLET ORAL EVERY 6 HOURS PRN
Status: DISCONTINUED | OUTPATIENT
Start: 2019-05-16 | End: 2019-05-17 | Stop reason: HOSPADM

## 2019-05-16 RX ORDER — CLOPIDOGREL BISULFATE 75 MG/1
75 TABLET ORAL DAILY
Status: DISCONTINUED | OUTPATIENT
Start: 2019-05-17 | End: 2019-05-17 | Stop reason: HOSPADM

## 2019-05-16 RX ORDER — CLONAZEPAM 0.5 MG/1
0.5 TABLET ORAL 2 TIMES DAILY
Status: DISCONTINUED | OUTPATIENT
Start: 2019-05-16 | End: 2019-05-17 | Stop reason: HOSPADM

## 2019-05-16 RX ORDER — LIDOCAINE HYDROCHLORIDE 10 MG/ML
0.5 INJECTION, SOLUTION EPIDURAL; INFILTRATION; INTRACAUDAL; PERINEURAL ONCE AS NEEDED
Status: COMPLETED | OUTPATIENT
Start: 2019-05-16 | End: 2019-05-16

## 2019-05-16 RX ORDER — CLOPIDOGREL BISULFATE 75 MG/1
150 TABLET ORAL ONCE
Status: COMPLETED | OUTPATIENT
Start: 2019-05-16 | End: 2019-05-16

## 2019-05-16 RX ORDER — LACTULOSE 20 G/30ML
20 SOLUTION ORAL 2 TIMES DAILY PRN
Status: DISCONTINUED | OUTPATIENT
Start: 2019-05-16 | End: 2019-05-17 | Stop reason: HOSPADM

## 2019-05-16 RX ORDER — PROTAMINE SULFATE 10 MG/ML
INJECTION, SOLUTION INTRAVENOUS AS NEEDED
Status: DISCONTINUED | OUTPATIENT
Start: 2019-05-16 | End: 2019-05-16 | Stop reason: SURG

## 2019-05-16 RX ORDER — PAPAVERINE HYDROCHLORIDE 30 MG/ML
INJECTION INTRAMUSCULAR; INTRAVENOUS AS NEEDED
Status: DISCONTINUED | OUTPATIENT
Start: 2019-05-16 | End: 2019-05-16 | Stop reason: HOSPADM

## 2019-05-16 RX ORDER — SODIUM CHLORIDE 9 MG/ML
INJECTION, SOLUTION INTRAVENOUS CONTINUOUS PRN
Status: DISCONTINUED | OUTPATIENT
Start: 2019-05-16 | End: 2019-05-16

## 2019-05-16 RX ORDER — DEXAMETHASONE SODIUM PHOSPHATE 10 MG/ML
INJECTION, SOLUTION INTRAMUSCULAR; INTRAVENOUS AS NEEDED
Status: DISCONTINUED | OUTPATIENT
Start: 2019-05-16 | End: 2019-05-16 | Stop reason: SURG

## 2019-05-16 RX ORDER — HEPARIN SODIUM 5000 [USP'U]/ML
5000 INJECTION, SOLUTION INTRAVENOUS; SUBCUTANEOUS EVERY 8 HOURS SCHEDULED
Status: DISCONTINUED | OUTPATIENT
Start: 2019-05-17 | End: 2019-05-17 | Stop reason: HOSPADM

## 2019-05-16 RX ORDER — EPHEDRINE SULFATE 50 MG/ML
INJECTION INTRAVENOUS AS NEEDED
Status: DISCONTINUED | OUTPATIENT
Start: 2019-05-16 | End: 2019-05-16 | Stop reason: SURG

## 2019-05-16 RX ORDER — DEXTROSE AND SODIUM CHLORIDE 5; .45 G/100ML; G/100ML
50 INJECTION, SOLUTION INTRAVENOUS CONTINUOUS
Status: DISPENSED | OUTPATIENT
Start: 2019-05-16 | End: 2019-05-16

## 2019-05-16 RX ADMIN — ONDANSETRON 4 MG: 2 INJECTION INTRAMUSCULAR; INTRAVENOUS at 16:01

## 2019-05-16 RX ADMIN — FENTANYL CITRATE 25 MCG: 50 INJECTION, SOLUTION INTRAMUSCULAR; INTRAVENOUS at 16:48

## 2019-05-16 RX ADMIN — PROPOFOL 70 MG: 10 INJECTION, EMULSION INTRAVENOUS at 15:26

## 2019-05-16 RX ADMIN — TRAZODONE HYDROCHLORIDE 50 MG: 50 TABLET ORAL at 22:20

## 2019-05-16 RX ADMIN — FENTANYL 1 PATCH: 25 PATCH TRANSDERMAL at 20:48

## 2019-05-16 RX ADMIN — SODIUM CHLORIDE, SODIUM LACTATE, POTASSIUM CHLORIDE, AND CALCIUM CHLORIDE: .6; .31; .03; .02 INJECTION, SOLUTION INTRAVENOUS at 15:21

## 2019-05-16 RX ADMIN — HEPARIN SODIUM 1000 UNITS: 1000 INJECTION INTRAVENOUS; SUBCUTANEOUS at 15:56

## 2019-05-16 RX ADMIN — LIDOCAINE HYDROCHLORIDE 0.5 ML: 10 INJECTION, SOLUTION INFILTRATION; PERINEURAL at 13:59

## 2019-05-16 RX ADMIN — PHENYLEPHRINE HYDROCHLORIDE 200 MCG: 10 INJECTION INTRAVENOUS at 15:38

## 2019-05-16 RX ADMIN — DEXTROSE AND SODIUM CHLORIDE 50 ML/HR: 5; .45 INJECTION, SOLUTION INTRAVENOUS at 17:45

## 2019-05-16 RX ADMIN — HEPARIN SODIUM 3000 UNITS: 1000 INJECTION INTRAVENOUS; SUBCUTANEOUS at 15:47

## 2019-05-16 RX ADMIN — GABAPENTIN 300 MG: 300 CAPSULE ORAL at 20:49

## 2019-05-16 RX ADMIN — CLOPIDOGREL BISULFATE 150 MG: 75 TABLET ORAL at 17:45

## 2019-05-16 RX ADMIN — OXYCODONE HYDROCHLORIDE AND ACETAMINOPHEN 1.5 TABLET: 5; 325 TABLET ORAL at 22:20

## 2019-05-16 RX ADMIN — DEXTROSE AND SODIUM CHLORIDE 75 ML/HR: 5; .45 INJECTION, SOLUTION INTRAVENOUS at 13:58

## 2019-05-16 RX ADMIN — PHENYLEPHRINE HYDROCHLORIDE 100 MCG: 10 INJECTION INTRAVENOUS at 15:50

## 2019-05-16 RX ADMIN — DEXAMETHASONE SODIUM PHOSPHATE 4 MG: 10 INJECTION, SOLUTION INTRAMUSCULAR; INTRAVENOUS at 16:01

## 2019-05-16 RX ADMIN — PROTAMINE SULFATE 20 MG: 10 INJECTION, SOLUTION INTRAVENOUS at 16:47

## 2019-05-16 RX ADMIN — FENTANYL CITRATE 25 MCG: 50 INJECTION, SOLUTION INTRAMUSCULAR; INTRAVENOUS at 16:44

## 2019-05-16 RX ADMIN — LIDOCAINE HYDROCHLORIDE 30 MG: 10 INJECTION, SOLUTION INFILTRATION; PERINEURAL at 15:26

## 2019-05-16 RX ADMIN — FENTANYL CITRATE 50 MCG: 50 INJECTION, SOLUTION INTRAMUSCULAR; INTRAVENOUS at 15:26

## 2019-05-16 RX ADMIN — CLONAZEPAM 0.5 MG: 0.5 TABLET ORAL at 20:49

## 2019-05-16 RX ADMIN — SODIUM CHLORIDE: 0.9 INJECTION, SOLUTION INTRAVENOUS at 15:31

## 2019-05-16 RX ADMIN — EPHEDRINE SULFATE 10 MG: 50 INJECTION, SOLUTION INTRAVENOUS at 15:50

## 2019-05-17 ENCOUNTER — TRANSITIONAL CARE MANAGEMENT (OUTPATIENT)
Dept: FAMILY MEDICINE CLINIC | Facility: CLINIC | Age: 79
End: 2019-05-17

## 2019-05-17 VITALS
HEIGHT: 60 IN | SYSTOLIC BLOOD PRESSURE: 140 MMHG | WEIGHT: 77 LBS | OXYGEN SATURATION: 94 % | HEART RATE: 79 BPM | DIASTOLIC BLOOD PRESSURE: 77 MMHG | TEMPERATURE: 97.5 F | BODY MASS INDEX: 15.12 KG/M2 | RESPIRATION RATE: 18 BRPM

## 2019-05-17 PROCEDURE — 99212 OFFICE O/P EST SF 10 MIN: CPT | Performed by: SURGERY

## 2019-05-17 PROCEDURE — NC001 PR NO CHARGE: Performed by: SURGERY

## 2019-05-17 RX ORDER — CLOPIDOGREL BISULFATE 75 MG/1
75 TABLET ORAL DAILY
Qty: 31 TABLET | Refills: 2 | Status: SHIPPED | OUTPATIENT
Start: 2019-05-18 | End: 2019-09-13 | Stop reason: HOSPADM

## 2019-05-17 RX ADMIN — HEPARIN SODIUM 5000 UNITS: 5000 INJECTION INTRAVENOUS; SUBCUTANEOUS at 05:29

## 2019-05-17 RX ADMIN — OXYCODONE HYDROCHLORIDE AND ACETAMINOPHEN 1.5 TABLET: 5; 325 TABLET ORAL at 10:17

## 2019-05-17 RX ADMIN — ASPIRIN 81 MG 81 MG: 81 TABLET ORAL at 08:19

## 2019-05-17 RX ADMIN — CLOPIDOGREL BISULFATE 75 MG: 75 TABLET ORAL at 08:19

## 2019-05-17 RX ADMIN — GABAPENTIN 300 MG: 300 CAPSULE ORAL at 08:19

## 2019-05-17 RX ADMIN — CLONAZEPAM 0.5 MG: 0.5 TABLET ORAL at 08:18

## 2019-05-21 ENCOUNTER — OFFICE VISIT (OUTPATIENT)
Dept: PAIN MEDICINE | Facility: CLINIC | Age: 79
End: 2019-05-21
Payer: MEDICARE

## 2019-05-21 VITALS
HEART RATE: 90 BPM | DIASTOLIC BLOOD PRESSURE: 62 MMHG | WEIGHT: 77 LBS | HEIGHT: 60 IN | BODY MASS INDEX: 15.12 KG/M2 | SYSTOLIC BLOOD PRESSURE: 90 MMHG | RESPIRATION RATE: 16 BRPM

## 2019-05-21 DIAGNOSIS — S32.030D CLOSED WEDGE COMPRESSION FRACTURE OF THIRD LUMBAR VERTEBRA WITH ROUTINE HEALING, SUBSEQUENT ENCOUNTER: ICD-10-CM

## 2019-05-21 DIAGNOSIS — F11.20 UNCOMPLICATED OPIOID DEPENDENCE (HCC): ICD-10-CM

## 2019-05-21 DIAGNOSIS — M80.00XD OSTEOPOROSIS WITH CURRENT PATHOLOGICAL FRACTURE WITH ROUTINE HEALING, UNSPECIFIED OSTEOPOROSIS TYPE, SUBSEQUENT ENCOUNTER: ICD-10-CM

## 2019-05-21 DIAGNOSIS — Z79.891 LONG-TERM CURRENT USE OF OPIATE ANALGESIC: ICD-10-CM

## 2019-05-21 DIAGNOSIS — G89.4 CHRONIC PAIN SYNDROME: Primary | ICD-10-CM

## 2019-05-21 PROCEDURE — 99214 OFFICE O/P EST MOD 30 MIN: CPT | Performed by: ANESTHESIOLOGY

## 2019-05-21 RX ORDER — FENTANYL 25 UG/H
PATCH TRANSDERMAL
Qty: 10 PATCH | Refills: 0 | Status: SHIPPED | OUTPATIENT
Start: 2019-05-28 | End: 2019-05-21 | Stop reason: SDUPTHER

## 2019-05-21 RX ORDER — OXYCODONE AND ACETAMINOPHEN 7.5; 325 MG/1; MG/1
1 TABLET ORAL EVERY 8 HOURS PRN
Qty: 90 TABLET | Refills: 0 | Status: SHIPPED | OUTPATIENT
Start: 2019-05-21 | End: 2019-05-21 | Stop reason: SDUPTHER

## 2019-05-21 RX ORDER — FENTANYL 25 UG/H
PATCH TRANSDERMAL
Qty: 10 PATCH | Refills: 0 | Status: SHIPPED | OUTPATIENT
Start: 2019-06-27 | End: 2019-07-17 | Stop reason: SDUPTHER

## 2019-05-21 RX ORDER — OXYCODONE AND ACETAMINOPHEN 7.5; 325 MG/1; MG/1
1 TABLET ORAL EVERY 8 HOURS PRN
Qty: 90 TABLET | Refills: 0 | Status: SHIPPED | OUTPATIENT
Start: 2019-06-27 | End: 2019-07-17 | Stop reason: SDUPTHER

## 2019-05-21 RX ORDER — OXYCODONE AND ACETAMINOPHEN 7.5; 325 MG/1; MG/1
1 TABLET ORAL EVERY 8 HOURS PRN
Qty: 90 TABLET | Refills: 0 | Status: SHIPPED | OUTPATIENT
Start: 2019-05-28 | End: 2019-05-21 | Stop reason: SDUPTHER

## 2019-05-21 RX ORDER — OXYCODONE AND ACETAMINOPHEN 7.5; 325 MG/1; MG/1
1 TABLET ORAL EVERY 8 HOURS PRN
Qty: 21 TABLET | Refills: 0 | Status: SHIPPED | OUTPATIENT
Start: 2019-05-21 | End: 2019-05-21 | Stop reason: SDUPTHER

## 2019-05-22 ENCOUNTER — APPOINTMENT (OUTPATIENT)
Dept: LAB | Facility: HOSPITAL | Age: 79
End: 2019-05-22
Payer: MEDICARE

## 2019-05-22 DIAGNOSIS — R79.89 LOW TSH LEVEL: ICD-10-CM

## 2019-05-22 DIAGNOSIS — J44.9 COPD, MILD (HCC): ICD-10-CM

## 2019-05-22 DIAGNOSIS — R63.4 WEIGHT LOSS: ICD-10-CM

## 2019-05-22 DIAGNOSIS — R63.6 UNDERWEIGHT: ICD-10-CM

## 2019-05-22 DIAGNOSIS — Z13.220 SCREENING, LIPID: ICD-10-CM

## 2019-05-22 DIAGNOSIS — I71.4 ABDOMINAL AORTIC ANEURYSM WITHOUT RUPTURE (HCC): ICD-10-CM

## 2019-05-22 LAB
ALBUMIN SERPL BCP-MCNC: 3.1 G/DL (ref 3.5–5)
ALP SERPL-CCNC: 65 U/L (ref 46–116)
ALT SERPL W P-5'-P-CCNC: 15 U/L (ref 12–78)
ANION GAP SERPL CALCULATED.3IONS-SCNC: 5 MMOL/L (ref 4–13)
AST SERPL W P-5'-P-CCNC: 19 U/L (ref 5–45)
BASOPHILS # BLD AUTO: 0.07 THOUSANDS/ΜL (ref 0–0.1)
BASOPHILS NFR BLD AUTO: 1 % (ref 0–1)
BILIRUB SERPL-MCNC: 0.43 MG/DL (ref 0.2–1)
BUN SERPL-MCNC: 12 MG/DL (ref 5–25)
CALCIUM SERPL-MCNC: 9 MG/DL (ref 8.3–10.1)
CHLORIDE SERPL-SCNC: 101 MMOL/L (ref 100–108)
CHOLEST SERPL-MCNC: 179 MG/DL (ref 50–200)
CO2 SERPL-SCNC: 30 MMOL/L (ref 21–32)
CREAT SERPL-MCNC: 0.97 MG/DL (ref 0.6–1.3)
EOSINOPHIL # BLD AUTO: 0.25 THOUSAND/ΜL (ref 0–0.61)
EOSINOPHIL NFR BLD AUTO: 3 % (ref 0–6)
ERYTHROCYTE [DISTWIDTH] IN BLOOD BY AUTOMATED COUNT: 14.6 % (ref 11.6–15.1)
FERRITIN SERPL-MCNC: 166 NG/ML (ref 8–388)
FOLATE SERPL-MCNC: >20 NG/ML (ref 3.1–17.5)
GFR SERPL CREATININE-BSD FRML MDRD: 56 ML/MIN/1.73SQ M
GLUCOSE SERPL-MCNC: 75 MG/DL (ref 65–140)
HCT VFR BLD AUTO: 34.6 % (ref 34.8–46.1)
HDLC SERPL-MCNC: 54 MG/DL (ref 40–60)
HGB BLD-MCNC: 11 G/DL (ref 11.5–15.4)
IMM GRANULOCYTES # BLD AUTO: 0.02 THOUSAND/UL (ref 0–0.2)
IMM GRANULOCYTES NFR BLD AUTO: 0 % (ref 0–2)
IRON SATN MFR SERPL: 16 %
IRON SERPL-MCNC: 38 UG/DL (ref 50–170)
LDLC SERPL CALC-MCNC: 98 MG/DL (ref 0–100)
LYMPHOCYTES # BLD AUTO: 1.31 THOUSANDS/ΜL (ref 0.6–4.47)
LYMPHOCYTES NFR BLD AUTO: 17 % (ref 14–44)
MCH RBC QN AUTO: 30.9 PG (ref 26.8–34.3)
MCHC RBC AUTO-ENTMCNC: 31.8 G/DL (ref 31.4–37.4)
MCV RBC AUTO: 97 FL (ref 82–98)
MONOCYTES # BLD AUTO: 0.86 THOUSAND/ΜL (ref 0.17–1.22)
MONOCYTES NFR BLD AUTO: 11 % (ref 4–12)
NEUTROPHILS # BLD AUTO: 5.42 THOUSANDS/ΜL (ref 1.85–7.62)
NEUTS SEG NFR BLD AUTO: 68 % (ref 43–75)
NONHDLC SERPL-MCNC: 125 MG/DL
NRBC BLD AUTO-RTO: 0 /100 WBCS
PLATELET # BLD AUTO: 332 THOUSANDS/UL (ref 149–390)
PMV BLD AUTO: 8.8 FL (ref 8.9–12.7)
POTASSIUM SERPL-SCNC: 4.7 MMOL/L (ref 3.5–5.3)
PROT SERPL-MCNC: 7.2 G/DL (ref 6.4–8.2)
RBC # BLD AUTO: 3.56 MILLION/UL (ref 3.81–5.12)
SODIUM SERPL-SCNC: 136 MMOL/L (ref 136–145)
T4 FREE SERPL-MCNC: 1.26 NG/DL (ref 0.76–1.46)
TIBC SERPL-MCNC: 231 UG/DL (ref 250–450)
TRIGL SERPL-MCNC: 136 MG/DL
TSH SERPL DL<=0.05 MIU/L-ACNC: 0.19 UIU/ML (ref 0.36–3.74)
VENIPUNCTURE: NORMAL
VIT B12 SERPL-MCNC: 466 PG/ML (ref 100–900)
WBC # BLD AUTO: 7.93 THOUSAND/UL (ref 4.31–10.16)

## 2019-05-22 PROCEDURE — 84481 FREE ASSAY (FT-3): CPT

## 2019-05-22 PROCEDURE — 84443 ASSAY THYROID STIM HORMONE: CPT

## 2019-05-22 PROCEDURE — 80061 LIPID PANEL: CPT

## 2019-05-22 PROCEDURE — 80053 COMPREHEN METABOLIC PANEL: CPT

## 2019-05-22 PROCEDURE — 83540 ASSAY OF IRON: CPT

## 2019-05-22 PROCEDURE — 82607 VITAMIN B-12: CPT

## 2019-05-22 PROCEDURE — 85025 COMPLETE CBC W/AUTO DIFF WBC: CPT

## 2019-05-22 PROCEDURE — 84439 ASSAY OF FREE THYROXINE: CPT

## 2019-05-22 PROCEDURE — 36415 COLL VENOUS BLD VENIPUNCTURE: CPT

## 2019-05-22 PROCEDURE — 82728 ASSAY OF FERRITIN: CPT

## 2019-05-22 PROCEDURE — 83550 IRON BINDING TEST: CPT

## 2019-05-22 PROCEDURE — 82746 ASSAY OF FOLIC ACID SERUM: CPT

## 2019-05-23 ENCOUNTER — APPOINTMENT (OUTPATIENT)
Dept: LAB | Facility: HOSPITAL | Age: 79
End: 2019-05-23
Payer: MEDICARE

## 2019-05-23 ENCOUNTER — TRANSCRIBE ORDERS (OUTPATIENT)
Dept: ADMINISTRATIVE | Facility: HOSPITAL | Age: 79
End: 2019-05-23

## 2019-05-23 DIAGNOSIS — R79.89 LOW TSH LEVEL: Primary | ICD-10-CM

## 2019-05-23 DIAGNOSIS — R63.6 UNDERWEIGHT: ICD-10-CM

## 2019-05-23 DIAGNOSIS — R63.4 WEIGHT LOSS: ICD-10-CM

## 2019-05-23 DIAGNOSIS — Z13.220 SCREENING FOR LIPOID DISORDERS: ICD-10-CM

## 2019-05-23 DIAGNOSIS — Z13.220 SCREENING FOR LIPID DISORDERS: Primary | ICD-10-CM

## 2019-05-23 LAB
BACTERIA UR QL AUTO: NORMAL /HPF
BILIRUB UR QL STRIP: NEGATIVE
CLARITY UR: CLEAR
COLOR UR: YELLOW
CREAT UR-MCNC: 51.7 MG/DL
GLUCOSE UR STRIP-MCNC: NEGATIVE MG/DL
HEMOCCULT STL QL IA: NEGATIVE
HGB UR QL STRIP.AUTO: NEGATIVE
KETONES UR STRIP-MCNC: NEGATIVE MG/DL
LEUKOCYTE ESTERASE UR QL STRIP: NEGATIVE
MICROALBUMIN UR-MCNC: 61.8 MG/L (ref 0–20)
MICROALBUMIN/CREAT 24H UR: 120 MG/G CREATININE (ref 0–30)
NITRITE UR QL STRIP: NEGATIVE
NON-SQ EPI CELLS URNS QL MICRO: NORMAL /HPF
PH UR STRIP.AUTO: 7 [PH]
PROT UR STRIP-MCNC: NEGATIVE MG/DL
RBC #/AREA URNS AUTO: NORMAL /HPF
SP GR UR STRIP.AUTO: <=1.005 (ref 1–1.03)
T3FREE SERPL-MCNC: 2.21 PG/ML (ref 2.3–4.2)
UROBILINOGEN UR QL STRIP.AUTO: 0.2 E.U./DL
WBC #/AREA URNS AUTO: NORMAL /HPF

## 2019-05-23 PROCEDURE — 81001 URINALYSIS AUTO W/SCOPE: CPT | Performed by: FAMILY MEDICINE

## 2019-05-23 PROCEDURE — G0328 FECAL BLOOD SCRN IMMUNOASSAY: HCPCS

## 2019-05-23 PROCEDURE — 82570 ASSAY OF URINE CREATININE: CPT | Performed by: FAMILY MEDICINE

## 2019-05-23 PROCEDURE — 82043 UR ALBUMIN QUANTITATIVE: CPT | Performed by: FAMILY MEDICINE

## 2019-05-24 ENCOUNTER — OFFICE VISIT (OUTPATIENT)
Dept: FAMILY MEDICINE CLINIC | Facility: CLINIC | Age: 79
End: 2019-05-24
Payer: MEDICARE

## 2019-05-24 VITALS
HEART RATE: 97 BPM | SYSTOLIC BLOOD PRESSURE: 88 MMHG | TEMPERATURE: 98.1 F | BODY MASS INDEX: 15.59 KG/M2 | RESPIRATION RATE: 16 BRPM | HEIGHT: 60 IN | WEIGHT: 79.4 LBS | OXYGEN SATURATION: 92 % | DIASTOLIC BLOOD PRESSURE: 60 MMHG

## 2019-05-24 DIAGNOSIS — R79.89 DECREASED THYROID STIMULATING HORMONE (TSH) LEVEL: ICD-10-CM

## 2019-05-24 DIAGNOSIS — I10 ESSENTIAL HYPERTENSION: ICD-10-CM

## 2019-05-24 DIAGNOSIS — R07.89 ATYPICAL CHEST PAIN: ICD-10-CM

## 2019-05-24 DIAGNOSIS — M80.00XD OSTEOPOROSIS WITH CURRENT PATHOLOGICAL FRACTURE WITH ROUTINE HEALING, UNSPECIFIED OSTEOPOROSIS TYPE, SUBSEQUENT ENCOUNTER: ICD-10-CM

## 2019-05-24 DIAGNOSIS — R63.6 UNDERWEIGHT: ICD-10-CM

## 2019-05-24 DIAGNOSIS — Z51.89 AFTERCARE: Primary | ICD-10-CM

## 2019-05-24 DIAGNOSIS — R93.89 ABNORMAL CXR: ICD-10-CM

## 2019-05-24 DIAGNOSIS — G25.9 MOVEMENT DISORDER: ICD-10-CM

## 2019-05-24 PROCEDURE — 99496 TRANSJ CARE MGMT HIGH F2F 7D: CPT | Performed by: FAMILY MEDICINE

## 2019-05-28 ENCOUNTER — TELEPHONE (OUTPATIENT)
Dept: FAMILY MEDICINE CLINIC | Facility: CLINIC | Age: 79
End: 2019-05-28

## 2019-06-06 ENCOUNTER — HOSPITAL ENCOUNTER (OUTPATIENT)
Dept: CT IMAGING | Facility: CLINIC | Age: 79
Discharge: HOME | End: 2019-06-06
Payer: MEDICARE

## 2019-06-06 DIAGNOSIS — R93.89 ABNORMAL CXR: ICD-10-CM

## 2019-06-06 PROCEDURE — 71250 CT THORAX DX C-: CPT

## 2019-06-11 ENCOUNTER — OFFICE VISIT (OUTPATIENT)
Dept: FAMILY MEDICINE CLINIC | Facility: CLINIC | Age: 79
End: 2019-06-11
Payer: MEDICARE

## 2019-06-11 VITALS
WEIGHT: 78.05 LBS | RESPIRATION RATE: 14 BRPM | HEART RATE: 91 BPM | OXYGEN SATURATION: 91 % | TEMPERATURE: 98 F | DIASTOLIC BLOOD PRESSURE: 62 MMHG | BODY MASS INDEX: 15.32 KG/M2 | SYSTOLIC BLOOD PRESSURE: 108 MMHG | HEIGHT: 60 IN

## 2019-06-11 DIAGNOSIS — Z00.00 MEDICARE ANNUAL WELLNESS VISIT, SUBSEQUENT: Primary | ICD-10-CM

## 2019-06-11 DIAGNOSIS — F41.9 ANXIETY: ICD-10-CM

## 2019-06-11 DIAGNOSIS — R11.0 NAUSEA: ICD-10-CM

## 2019-06-11 DIAGNOSIS — Z13.5 SCREENING FOR GLAUCOMA: ICD-10-CM

## 2019-06-11 DIAGNOSIS — M80.00XD OSTEOPOROSIS WITH CURRENT PATHOLOGICAL FRACTURE WITH ROUTINE HEALING, UNSPECIFIED OSTEOPOROSIS TYPE, SUBSEQUENT ENCOUNTER: ICD-10-CM

## 2019-06-11 DIAGNOSIS — R10.9 ABDOMINAL PAIN, UNSPECIFIED ABDOMINAL LOCATION: ICD-10-CM

## 2019-06-11 DIAGNOSIS — R53.1 GENERALIZED WEAKNESS: ICD-10-CM

## 2019-06-11 PROCEDURE — G0439 PPPS, SUBSEQ VISIT: HCPCS | Performed by: FAMILY MEDICINE

## 2019-06-11 PROCEDURE — 99214 OFFICE O/P EST MOD 30 MIN: CPT | Performed by: FAMILY MEDICINE

## 2019-06-11 RX ORDER — CLONAZEPAM 0.5 MG/1
TABLET, ORALLY DISINTEGRATING ORAL
Qty: 90 TABLET | Refills: 3 | Status: SHIPPED | OUTPATIENT
Start: 2019-06-11 | End: 2019-06-13

## 2019-06-11 RX ORDER — ONDANSETRON 4 MG/1
4 TABLET, ORALLY DISINTEGRATING ORAL EVERY 8 HOURS PRN
Qty: 30 TABLET | Refills: 0 | Status: SHIPPED | OUTPATIENT
Start: 2019-06-11 | End: 2019-06-27 | Stop reason: SDUPTHER

## 2019-06-12 ENCOUNTER — TELEPHONE (OUTPATIENT)
Dept: FAMILY MEDICINE CLINIC | Facility: CLINIC | Age: 79
End: 2019-06-12

## 2019-06-13 DIAGNOSIS — F41.9 ANXIETY: Primary | ICD-10-CM

## 2019-06-13 RX ORDER — CLONAZEPAM 0.5 MG/1
0.5 TABLET ORAL 3 TIMES DAILY PRN
Qty: 90 TABLET | Refills: 3 | Status: SHIPPED | OUTPATIENT
Start: 2019-06-13

## 2019-06-18 ENCOUNTER — OFFICE VISIT (OUTPATIENT)
Dept: VASCULAR SURGERY | Facility: CLINIC | Age: 79
End: 2019-06-18
Payer: MEDICARE

## 2019-06-18 VITALS
DIASTOLIC BLOOD PRESSURE: 84 MMHG | WEIGHT: 79 LBS | HEIGHT: 60 IN | HEART RATE: 64 BPM | SYSTOLIC BLOOD PRESSURE: 140 MMHG | BODY MASS INDEX: 15.51 KG/M2 | TEMPERATURE: 97 F

## 2019-06-18 DIAGNOSIS — K55.1 SMA STENOSIS: Primary | ICD-10-CM

## 2019-06-18 DIAGNOSIS — I71.4 ABDOMINAL AORTIC ANEURYSM (AAA) WITHOUT RUPTURE (HCC): ICD-10-CM

## 2019-06-18 DIAGNOSIS — R10.9 ABDOMINAL PAIN, UNSPECIFIED ABDOMINAL LOCATION: ICD-10-CM

## 2019-06-18 PROCEDURE — 99212 OFFICE O/P EST SF 10 MIN: CPT | Performed by: SURGERY

## 2019-06-27 DIAGNOSIS — R11.0 NAUSEA: ICD-10-CM

## 2019-06-27 RX ORDER — ONDANSETRON 4 MG/1
TABLET, ORALLY DISINTEGRATING ORAL
Qty: 30 TABLET | Refills: 0 | Status: SHIPPED | OUTPATIENT
Start: 2019-06-27 | End: 2019-07-25 | Stop reason: SDUPTHER

## 2019-07-05 DIAGNOSIS — K59.00 CONSTIPATION, UNSPECIFIED CONSTIPATION TYPE: ICD-10-CM

## 2019-07-05 RX ORDER — LACTULOSE 10 G/15ML
SOLUTION ORAL; RECTAL
Qty: 240 ML | Refills: 1 | Status: SHIPPED | OUTPATIENT
Start: 2019-07-05 | End: 2019-08-05 | Stop reason: SDUPTHER

## 2019-07-17 ENCOUNTER — OFFICE VISIT (OUTPATIENT)
Dept: PAIN MEDICINE | Facility: CLINIC | Age: 79
End: 2019-07-17
Payer: MEDICARE

## 2019-07-17 VITALS
HEART RATE: 84 BPM | SYSTOLIC BLOOD PRESSURE: 110 MMHG | WEIGHT: 79 LBS | HEIGHT: 60 IN | BODY MASS INDEX: 15.51 KG/M2 | DIASTOLIC BLOOD PRESSURE: 74 MMHG

## 2019-07-17 DIAGNOSIS — K59.03 THERAPEUTIC OPIOID-INDUCED CONSTIPATION (OIC): Primary | ICD-10-CM

## 2019-07-17 DIAGNOSIS — G89.4 CHRONIC PAIN SYNDROME: ICD-10-CM

## 2019-07-17 DIAGNOSIS — M80.00XD OSTEOPOROSIS WITH CURRENT PATHOLOGICAL FRACTURE WITH ROUTINE HEALING, UNSPECIFIED OSTEOPOROSIS TYPE, SUBSEQUENT ENCOUNTER: ICD-10-CM

## 2019-07-17 DIAGNOSIS — S32.030D CLOSED WEDGE COMPRESSION FRACTURE OF THIRD LUMBAR VERTEBRA WITH ROUTINE HEALING, SUBSEQUENT ENCOUNTER: ICD-10-CM

## 2019-07-17 DIAGNOSIS — T40.2X5A THERAPEUTIC OPIOID-INDUCED CONSTIPATION (OIC): Primary | ICD-10-CM

## 2019-07-17 DIAGNOSIS — F11.20 UNCOMPLICATED OPIOID DEPENDENCE (HCC): ICD-10-CM

## 2019-07-17 PROCEDURE — 99214 OFFICE O/P EST MOD 30 MIN: CPT | Performed by: ANESTHESIOLOGY

## 2019-07-17 RX ORDER — OXYCODONE AND ACETAMINOPHEN 7.5; 325 MG/1; MG/1
1 TABLET ORAL EVERY 8 HOURS PRN
Qty: 90 TABLET | Refills: 0 | Status: SHIPPED | OUTPATIENT
Start: 2019-07-27 | End: 2019-07-17 | Stop reason: SDUPTHER

## 2019-07-17 RX ORDER — FENTANYL 25 UG/H
PATCH TRANSDERMAL
Qty: 10 PATCH | Refills: 0 | Status: SHIPPED | OUTPATIENT
Start: 2019-08-26 | End: 2019-09-11 | Stop reason: SDUPTHER

## 2019-07-17 RX ORDER — OXYCODONE AND ACETAMINOPHEN 7.5; 325 MG/1; MG/1
1 TABLET ORAL EVERY 8 HOURS PRN
Qty: 90 TABLET | Refills: 0 | Status: SHIPPED | OUTPATIENT
Start: 2019-08-26 | End: 2019-09-11 | Stop reason: SDUPTHER

## 2019-07-17 RX ORDER — FENTANYL 25 UG/H
PATCH TRANSDERMAL
Qty: 10 PATCH | Refills: 0 | Status: SHIPPED | OUTPATIENT
Start: 2019-07-27 | End: 2019-07-17 | Stop reason: SDUPTHER

## 2019-07-17 NOTE — PROGRESS NOTES
Assessment:  1  Therapeutic opioid-induced constipation (OIC)  Naloxegol Oxalate (MOVANTIK) 12 5 MG TABS   2  Chronic pain syndrome  fentaNYL (DURAGESIC) 25 mcg/hr    oxyCODONE-acetaminophen (PERCOCET) 7 5-325 MG per tablet    DISCONTINUED: oxyCODONE-acetaminophen (PERCOCET) 7 5-325 MG per tablet    DISCONTINUED: fentaNYL (DURAGESIC) 25 mcg/hr   3  Uncomplicated opioid dependence ([address] )  oxyCODONE-acetaminophen (PERCOCET) 7 5-325 MG per tablet    DISCONTINUED: oxyCODONE-acetaminophen (PERCOCET) 7 5-325 MG per tablet   4  Closed wedge compression fracture of third lumbar vertebra with routine healing, subsequent encounter  oxyCODONE-acetaminophen (PERCOCET) 7 5-325 MG per tablet    DISCONTINUED: oxyCODONE-acetaminophen (PERCOCET) 7 5-325 MG per tablet   5  Osteoporosis with current pathological fracture with routine healing, unspecified osteoporosis type, subsequent encounter  oxyCODONE-acetaminophen (PERCOCET) 7 5-325 MG per tablet    DISCONTINUED: oxyCODONE-acetaminophen (PERCOCET) 7 5-325 MG per tablet       Plan: This is a 70-year-old female who presents today for followup management of chronic low back pain-thoracic radiculopathy, thoracolumbar compression fractures  Patient also has multiple medical issues including vascular aneurysm  Patient is currently on opiate regimen to manage her pain  She is on fentanyl patch 25 mcg Q 72 hours and oxycodone acetaminophen 7 5/325 mg p o  Q 8 hours p r n   In addition, for neuropathic pain symptoms patient is on gabapentin 300 mg p  O  T i d  And Flexeril 10 mg p o  Q h s  To help with muscle spasms  Pain control remains stable  Today, patient will receive refill of fentanyl patch and oxycodone-acetaminophen  Prescriptions with dated to be filled on July 27, 2019 and August 26, 2019  I will see patient back in 8 weeks for reassessment and medication refill  Patient will continue with Flexeril and gabapentin as prescribed      There are risks associated with opioid medications, including dependence, addiction and tolerance  The patient understands and agrees to use these medications only as prescribed  Potential side effects of the medications include, but are not limited to, constipation, drowsiness, addiction, impaired judgment and risk of fatal overdose if not taken as prescribed  The patient was warned against driving while taking sedation medications  Sharing medications is a felony  At this point in time, the patient is showing no signs of addiction, abuse, diversion or suicidal ideation  [facility] Prescription Drug Monitoring Program report was reviewed and was appropriate     Constipation will be managed with more vented 12 5 mg p o  Daily  My impressions and treatment recommendations were discussed in detail with the patient who verbalized understanding and had no further questions  Discharge instructions were provided  I personally saw and examined the patient and I agree with the above discussed plan of care  History of Present Illness:  Teresa Lopez is a 78 y o  female who presents for a follow up office visit in regards to Back Pain  The patients current symptoms includes constant pain primarily in her low back pain  The patients current symptoms include intermittent, low back pain which is constant, sharp and pressure-like in nature   Patient reports 8/10 pain   Patient is currently on fentanyl patch 25 mcg, oxycodone-acetaminophen 7 5/325 mg p o  T i d  She is currently 77lb   Patient's  reports that patient is very weak and has difficulty performing her ADLs   Prescription for fentanyl patch and oxycodone was filled on  June 27, 2019  Patient reports that the medication takes the edge of her pain  She complains of severe constipation not relieved with miralax and lactulose      UDS:  March 2019  OA:  March 2019    I have personally reviewed and/or updated the patient's past medical history, past surgical history, family history, social history, current medications, allergies, and vital signs today  Review of Systems   Respiratory: Negative for shortness of breath  Cardiovascular: Negative for chest pain  Gastrointestinal: Positive for constipation and nausea  Negative for diarrhea and vomiting  Musculoskeletal: Negative for arthralgias, gait problem, joint swelling and myalgias  Skin: Negative for rash  Neurological: Negative for dizziness, seizures and weakness  All other systems reviewed and are negative        Patient Active Problem List   Diagnosis    Abdominal pain    Abnormal involuntary movements    Cataract, bilateral    Chronic pain syndrome    Closed wedge fracture of thoracic vertebra with delayed healing    COPD, mild (HCC)    Generalized weakness    HTN (hypertension)    Hyponatremia    Irritable bowel syndrome without diarrhea    Osteoporosis    Thoracic radiculopathy    Closed fracture of third lumbar vertebra with routine healing    Long-term current use of opiate analgesic    Uncomplicated opioid dependence [address] [address]    Movement disorder    Abdominal aortic aneurysm (AAA) without rupture ([facility] )    Tobacco abuse    Anxiety    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[facility])    Preoperative clearance       Past Medical History:   Diagnosis Date    Abdominal aortic aneurysm (AAA) 3 0 cm to 5 0 cm in diameter in female ([facility] [address] ) 06/08/2018    4 9 cm     Abdominal pain 4/28/2016    Anxiety     Chronic obstructive lung disease (HCC)     Compression fracture of thoracic vertebra (HCC)     last assessed 2/20/17    Depression     Dysthymia     Failure to thrive in adult     Fracture of lumbar vertebra (HCC)     Hypertension     benign essential    Hyponatremia     Pressure injury of skin        Past Surgical History:   Procedure Laterality Date    APPENDECTOMY      HYSTERECTOMY      IR VISCERAL ANGIOGRAPHY / INTERVENTION  5/16/2019    KS VASCULAR SURGERY PROCEDURE UNLIST N/A 5/16/2019    Procedure: ARTERIOGRAM - mesenteric angiogram with insertion of mesenteric artery stent and Aortogram;  Surgeon: William Tinsley MD;  Location: [facility];  Service: Vascular       Family History   Problem Relation Age of Onset    Cirrhosis Mother         hepatic    Bone cancer Father        Social History     Occupational History    Not on file   Tobacco Use    Smoking status: Former Smoker     Last attempt to quit: 1/15/2019     Years since quittin 5    Smokeless tobacco: Never Used   Substance and Sexual Activity    Alcohol use: Not Currently     Frequency: Never     Binge frequency: Never     Comment: conflicting both occasionally an no use per [employer]    Drug use: No    Sexual activity: Not on file       Current Outpatient Medications on File Prior to Visit   Medication Sig    Calcium Carb-Cholecalciferol (CALCIUM 600 + D) 600-200 MG-UNIT TABS Take by mouth daily     clonazePAM (KlonoPIN) 0 5 mg tablet Take 1 tablet (0 5 mg total) by mouth 3 (three) times a day as needed for seizures    clopidogrel (PLAVIX) 75 mg tablet Take 1 tablet (75 mg total) by mouth daily    fentaNYL (DURAGESIC) 25 mcg/hr Place 1 patch q72hrs    gabapentin (NEURONTIN) 300 mg capsule TAKE 1 CAPSULE THREE TIMES A DAY (Patient taking differently: TAKE 1 CAPSULE TWO TIMES A DAY)    lactulose 10 g/15 mL TAKE 30 ML (20 G TOTAL) BY MOUTH 2 (TWO) TIMES A DAY AS NEEDED (CONSTIPATION)    Multiple Vitamins-Minerals (MULTIVITAMIN ADULT PO) Take by mouth daily     Naloxone HCl 4 MG/0 1ML LIQD Apply 1 spray in each nostril in the event of overdose from pain medication      ondansetron (ZOFRAN-ODT) 4 mg disintegrating tablet TAKE 1 TABLET BY MOUTH EVERY 8 HOURS AS NEEDED FOR NAUSEA AND VOMITING    oxyCODONE-acetaminophen (PERCOCET) 7 5-325 MG per tablet Take 1 tablet by mouth every 8 (eight) hours as needed for moderate pain for up to 30 daysMax Daily Amount: 3 tablets    Probiotic Product (ALIGN) 4 MG CAPS daily     Psyllium (METAMUCIL) 28 3 % POWD Take by mouth daily     traZODone (DESYREL) 50 mg tablet Take 1 tablet (50 mg total) by mouth daily at bedtime     No current facility-administered medications on file prior to visit  No Known Allergies    Physical Exam:    /74   Pulse 84   Ht 5' (1 524 m)   Wt 35 8 kg (79 lb)   BMI 15 43 kg/m²     Constitutional:normal, well developed, well nourished, alert, in no distress and non-toxic and no overt pain behavior    Eyes:anicteric  HEENT:grossly intact  Neck:supple, symmetric, trachea midline and no masses   Pulmonary:even and unlabored  Cardiovascular:No edema or pitting edema present  Skin:Normal without rashes or lesions and well hydrated  Psychiatric:Mood and affect appropriate  Neurologic: involuntary movements  Musculoskeletal:in wheelchair    Imaging

## 2019-07-25 DIAGNOSIS — R11.0 NAUSEA: ICD-10-CM

## 2019-07-25 RX ORDER — ONDANSETRON 4 MG/1
TABLET, ORALLY DISINTEGRATING ORAL
Qty: 30 TABLET | Refills: 1 | Status: SHIPPED | OUTPATIENT
Start: 2019-07-25

## 2019-07-27 DIAGNOSIS — M54.14 THORACIC RADICULOPATHY: ICD-10-CM

## 2019-07-27 DIAGNOSIS — G89.4 CHRONIC PAIN DISORDER: ICD-10-CM

## 2019-07-27 DIAGNOSIS — S22.000G CLOSED WEDGE FRACTURE OF THORACIC VERTEBRA WITH DELAYED HEALING, UNSPECIFIED THORACIC VERTEBRAL LEVEL, SUBSEQUENT ENCOUNTER: ICD-10-CM

## 2019-07-28 DIAGNOSIS — G47.00 INSOMNIA, UNSPECIFIED TYPE: ICD-10-CM

## 2019-07-28 RX ORDER — TRAZODONE HYDROCHLORIDE 50 MG/1
50 TABLET ORAL
Qty: 90 TABLET | Refills: 1 | Status: SHIPPED | OUTPATIENT
Start: 2019-07-28

## 2019-07-29 RX ORDER — GABAPENTIN 300 MG/1
CAPSULE ORAL
Qty: 90 CAPSULE | Refills: 2 | Status: SHIPPED | OUTPATIENT
Start: 2019-07-29 | End: 2019-08-23 | Stop reason: SDUPTHER

## 2019-07-31 ENCOUNTER — TRANSCRIBE ORDERS (OUTPATIENT)
Dept: LAB | Facility: HOSPITAL | Age: 79
End: 2019-07-31

## 2019-07-31 DIAGNOSIS — R79.89 HYPOURICEMIA: Primary | ICD-10-CM

## 2019-08-05 DIAGNOSIS — K59.00 CONSTIPATION, UNSPECIFIED CONSTIPATION TYPE: ICD-10-CM

## 2019-08-05 RX ORDER — LACTULOSE 10 G/15ML
SOLUTION ORAL; RECTAL
Qty: 946 ML | Refills: 1 | Status: SHIPPED | OUTPATIENT
Start: 2019-08-05

## 2019-08-07 ENCOUNTER — APPOINTMENT (OUTPATIENT)
Dept: LAB | Facility: HOSPITAL | Age: 79
End: 2019-08-07
Payer: MEDICARE

## 2019-08-07 DIAGNOSIS — R79.89 DECREASED THYROID STIMULATING HORMONE (TSH) LEVEL: ICD-10-CM

## 2019-08-07 DIAGNOSIS — I71.4 ABDOMINAL AORTIC ANEURYSM (AAA) WITHOUT RUPTURE (HCC): ICD-10-CM

## 2019-08-07 DIAGNOSIS — R79.89 HYPOURICEMIA: ICD-10-CM

## 2019-08-07 LAB
ANION GAP SERPL CALCULATED.3IONS-SCNC: 6 MMOL/L (ref 4–13)
BASOPHILS # BLD AUTO: 0.09 THOUSANDS/ΜL (ref 0–0.1)
BASOPHILS NFR BLD AUTO: 1 % (ref 0–1)
BUN SERPL-MCNC: 9 MG/DL (ref 5–25)
CALCIUM SERPL-MCNC: 9.2 MG/DL (ref 8.3–10.1)
CHLORIDE SERPL-SCNC: 95 MMOL/L (ref 100–108)
CO2 SERPL-SCNC: 32 MMOL/L (ref 21–32)
CREAT SERPL-MCNC: 0.96 MG/DL (ref 0.6–1.3)
EOSINOPHIL # BLD AUTO: 0.24 THOUSAND/ΜL (ref 0–0.61)
EOSINOPHIL NFR BLD AUTO: 3 % (ref 0–6)
ERYTHROCYTE [DISTWIDTH] IN BLOOD BY AUTOMATED COUNT: 13.2 % (ref 11.6–15.1)
GFR SERPL CREATININE-BSD FRML MDRD: 56 ML/MIN/1.73SQ M
GLUCOSE SERPL-MCNC: 65 MG/DL (ref 65–140)
HCT VFR BLD AUTO: 40.1 % (ref 34.8–46.1)
HGB BLD-MCNC: 12.7 G/DL (ref 11.5–15.4)
IMM GRANULOCYTES # BLD AUTO: 0.02 THOUSAND/UL (ref 0–0.2)
IMM GRANULOCYTES NFR BLD AUTO: 0 % (ref 0–2)
LYMPHOCYTES # BLD AUTO: 2.08 THOUSANDS/ΜL (ref 0.6–4.47)
LYMPHOCYTES NFR BLD AUTO: 29 % (ref 14–44)
MCH RBC QN AUTO: 30.2 PG (ref 26.8–34.3)
MCHC RBC AUTO-ENTMCNC: 31.7 G/DL (ref 31.4–37.4)
MCV RBC AUTO: 95 FL (ref 82–98)
MONOCYTES # BLD AUTO: 0.81 THOUSAND/ΜL (ref 0.17–1.22)
MONOCYTES NFR BLD AUTO: 11 % (ref 4–12)
NEUTROPHILS # BLD AUTO: 3.99 THOUSANDS/ΜL (ref 1.85–7.62)
NEUTS SEG NFR BLD AUTO: 56 % (ref 43–75)
NRBC BLD AUTO-RTO: 0 /100 WBCS
PLATELET # BLD AUTO: 312 THOUSANDS/UL (ref 149–390)
PMV BLD AUTO: 8.6 FL (ref 8.9–12.7)
POTASSIUM SERPL-SCNC: 4.7 MMOL/L (ref 3.5–5.3)
RBC # BLD AUTO: 4.21 MILLION/UL (ref 3.81–5.12)
SODIUM SERPL-SCNC: 133 MMOL/L (ref 136–145)
T3FREE SERPL-MCNC: 2.4 PG/ML (ref 2.3–4.2)
TSH SERPL DL<=0.05 MIU/L-ACNC: 1.07 UIU/ML (ref 0.36–3.74)
VENIPUNCTURE: NORMAL
WBC # BLD AUTO: 7.23 THOUSAND/UL (ref 4.31–10.16)

## 2019-08-07 PROCEDURE — 84481 FREE ASSAY (FT-3): CPT

## 2019-08-07 PROCEDURE — 36415 COLL VENOUS BLD VENIPUNCTURE: CPT

## 2019-08-07 PROCEDURE — 80048 BASIC METABOLIC PNL TOTAL CA: CPT

## 2019-08-07 PROCEDURE — 84443 ASSAY THYROID STIM HORMONE: CPT

## 2019-08-07 PROCEDURE — 85025 COMPLETE CBC W/AUTO DIFF WBC: CPT

## 2019-08-22 ENCOUNTER — HOSPITAL ENCOUNTER (OUTPATIENT)
Dept: NON INVASIVE DIAGNOSTICS | Facility: CLINIC | Age: 79
Discharge: HOME | End: 2019-08-22
Payer: MEDICARE

## 2019-08-22 DIAGNOSIS — K55.1 SMA STENOSIS: ICD-10-CM

## 2019-08-22 PROCEDURE — 93975 VASCULAR STUDY: CPT

## 2019-08-23 DIAGNOSIS — S22.000G CLOSED WEDGE FRACTURE OF THORACIC VERTEBRA WITH DELAYED HEALING, UNSPECIFIED THORACIC VERTEBRAL LEVEL, SUBSEQUENT ENCOUNTER: ICD-10-CM

## 2019-08-23 DIAGNOSIS — M54.14 THORACIC RADICULOPATHY: ICD-10-CM

## 2019-08-23 DIAGNOSIS — G89.4 CHRONIC PAIN DISORDER: ICD-10-CM

## 2019-08-23 PROCEDURE — 93975 VASCULAR STUDY: CPT | Performed by: SURGERY

## 2019-08-23 RX ORDER — GABAPENTIN 300 MG/1
CAPSULE ORAL
Qty: 90 CAPSULE | Refills: 2 | Status: SHIPPED | OUTPATIENT
Start: 2019-08-23

## 2019-09-11 ENCOUNTER — OFFICE VISIT (OUTPATIENT)
Dept: PAIN MEDICINE | Facility: CLINIC | Age: 79
End: 2019-09-11
Payer: MEDICARE

## 2019-09-11 VITALS
BODY MASS INDEX: 15.43 KG/M2 | DIASTOLIC BLOOD PRESSURE: 68 MMHG | HEART RATE: 100 BPM | RESPIRATION RATE: 16 BRPM | WEIGHT: 79 LBS | SYSTOLIC BLOOD PRESSURE: 100 MMHG

## 2019-09-11 DIAGNOSIS — S32.030D CLOSED WEDGE COMPRESSION FRACTURE OF THIRD LUMBAR VERTEBRA WITH ROUTINE HEALING, SUBSEQUENT ENCOUNTER: ICD-10-CM

## 2019-09-11 DIAGNOSIS — G89.4 CHRONIC PAIN SYNDROME: ICD-10-CM

## 2019-09-11 DIAGNOSIS — F11.20 UNCOMPLICATED OPIOID DEPENDENCE (HCC): Primary | ICD-10-CM

## 2019-09-11 DIAGNOSIS — M80.00XD OSTEOPOROSIS WITH CURRENT PATHOLOGICAL FRACTURE WITH ROUTINE HEALING, UNSPECIFIED OSTEOPOROSIS TYPE, SUBSEQUENT ENCOUNTER: ICD-10-CM

## 2019-09-11 DIAGNOSIS — Z79.891 LONG-TERM CURRENT USE OF OPIATE ANALGESIC: ICD-10-CM

## 2019-09-11 PROCEDURE — 99214 OFFICE O/P EST MOD 30 MIN: CPT | Performed by: ANESTHESIOLOGY

## 2019-09-11 PROCEDURE — 1124F ACP DISCUSS-NO DSCNMKR DOCD: CPT | Performed by: ANESTHESIOLOGY

## 2019-09-11 RX ORDER — FENTANYL 25 UG/H
PATCH TRANSDERMAL
Qty: 10 PATCH | Refills: 0 | Status: SHIPPED | OUTPATIENT
Start: 2019-10-25

## 2019-09-11 RX ORDER — METAXALONE 800 MG/1
800 TABLET ORAL 3 TIMES DAILY
Qty: 90 TABLET | Refills: 0 | Status: SHIPPED | OUTPATIENT
Start: 2019-09-11 | End: 2019-10-11

## 2019-09-11 RX ORDER — OXYCODONE AND ACETAMINOPHEN 10; 325 MG/1; MG/1
1 TABLET ORAL EVERY 8 HOURS PRN
Qty: 90 TABLET | Refills: 0 | Status: SHIPPED | OUTPATIENT
Start: 2019-09-25 | End: 2019-09-11 | Stop reason: SDUPTHER

## 2019-09-11 RX ORDER — OXYCODONE AND ACETAMINOPHEN 7.5; 325 MG/1; MG/1
1 TABLET ORAL EVERY 8 HOURS PRN
Qty: 90 TABLET | Refills: 0 | Status: SHIPPED | OUTPATIENT
Start: 2019-09-25 | End: 2019-09-11 | Stop reason: SDUPTHER

## 2019-09-11 RX ORDER — OXYCODONE AND ACETAMINOPHEN 7.5; 325 MG/1; MG/1
1 TABLET ORAL EVERY 8 HOURS PRN
Qty: 90 TABLET | Refills: 0 | Status: SHIPPED | OUTPATIENT
Start: 2019-10-25 | End: 2019-09-11

## 2019-09-11 RX ORDER — OXYCODONE AND ACETAMINOPHEN 10; 325 MG/1; MG/1
1 TABLET ORAL EVERY 8 HOURS PRN
Qty: 90 TABLET | Refills: 0 | Status: SHIPPED | OUTPATIENT
Start: 2019-10-25 | End: 2019-09-13 | Stop reason: HOSPADM

## 2019-09-11 RX ORDER — FENTANYL 25 UG/H
PATCH TRANSDERMAL
Qty: 10 PATCH | Refills: 0 | Status: SHIPPED | OUTPATIENT
Start: 2019-09-25 | End: 2019-09-11 | Stop reason: SDUPTHER

## 2019-09-11 NOTE — PROGRESS NOTES
Assessment:  1  Uncomplicated opioid dependence ([facility] [address] )  oxyCODONE-acetaminophen (PERCOCET) 7 5-325 MG per tablet    DISCONTINUED: oxyCODONE-acetaminophen (PERCOCET) 7 5-325 MG per tablet   2  Long-term current use of opiate analgesic     3  Chronic pain syndrome  oxyCODONE-acetaminophen (PERCOCET) 7 5-325 MG per tablet    fentaNYL (DURAGESIC) 25 mcg/hr    DISCONTINUED: oxyCODONE-acetaminophen (PERCOCET) 7 5-325 MG per tablet    DISCONTINUED: fentaNYL (DURAGESIC) 25 mcg/hr   4  Closed wedge compression fracture of third lumbar vertebra with routine healing, subsequent encounter  MRI lumbar spine without contrast    oxyCODONE-acetaminophen (PERCOCET) 7 5-325 MG per tablet    metaxalone (SKELAXIN) 800 mg tablet    DISCONTINUED: oxyCODONE-acetaminophen (PERCOCET) 7 5-325 MG per tablet   5  Osteoporosis with current pathological fracture with routine healing, unspecified osteoporosis type, subsequent encounter  MRI thoracic spine without contrast    MRI lumbar spine without contrast    oxyCODONE-acetaminophen (PERCOCET) 7 5-325 MG per tablet    metaxalone (SKELAXIN) 800 mg tablet    DISCONTINUED: oxyCODONE-acetaminophen (PERCOCET) 7 5-325 MG per tablet       Plan: This is a 79-year-old female with a history of multiple compression fractures in the thoracic and lumbosacral region  Patient reports worsening mid and low back pain today  Her pain symptoms is managed with fentanyl patch 25 micro grams Q 72 hours, oxycodone acetaminophen 7 5/325 milligram p o  T i d  For breakthrough pain  She reports constipation without much relief with movantik 12 5 milligrams p o  Daily  She reports worsening pain symptoms  At this time, I will continue patient on current medication regimen with fentanyl patch 25 micro grams Q 72 hours     However, I will increase the dose of oxycodone-acetaminophen 7 5/325 milligram to 10/325 milligram p o  T i d   Both prescriptions are dated to be filled on September 25, 2019 and October 25, 2019  Patient was handed prescriptions  Regarding worsening low back pain  I am concerned regarding worsening of compression fracture or new acute/subacute compression deformities present in the thoracolumbar region  I will order repeat imaging study, MRI of the thoracolumbar region  Upon completion, I will give patient a call to review the results accordingly  There are risks associated with opioid medications, including dependence, addiction and tolerance  The patient understands and agrees to use these medications only as prescribed  Potential side effects of the medications include, but are not limited to, constipation, drowsiness, addiction, impaired judgment and risk of fatal overdose if not taken as prescribed  The patient was warned against driving while taking sedation medications  Sharing medications is a felony  At this point in time, the patient is showing no signs of addiction, abuse, diversion or suicidal ideation  [place] Prescription Drug Monitoring Program report was reviewed and was appropriate     Patient will take clonazepam prior to MRI  She will be given skelaxin 800mg po TID to help with muscle spasms  To help with constipation, I advised that she may take 2 capsules a motor and sent p o  Daily  My impressions and treatment recommendations were discussed in detail with the patient who verbalized understanding and had no further questions  Discharge instructions were provided  I personally saw and examined the patient and I agree with the above discussed plan of care  History of Present Illness:  Teresa Lopez is a 78 y o  female who presents for a follow up office visit in regards to Back Pain  The patients current symptoms include Patient is currently on fentanyl patch 25 mcg, oxycodone-acetaminophen 7 5/325 mg p o  T i d  She states that her low back pain is worse today  Patient reports that the medication takes the edge of her pain   She complains of severe constipation not relieved with 12 5 milligrams of movantik  Pain score is rated 7/10  I have personally reviewed and/or updated the patient's past medical history, past surgical history, family history, social history, current medications, allergies, and vital signs today  Review of Systems   Respiratory: Negative for shortness of breath  Cardiovascular: Negative for chest pain  Gastrointestinal: Positive for constipation  Negative for diarrhea, nausea and vomiting  Musculoskeletal: Positive for back pain and gait problem  Negative for arthralgias, joint swelling and myalgias  Skin: Negative for rash  Neurological: Negative for dizziness, seizures and weakness  All other systems reviewed and are negative        Patient Active Problem List   Diagnosis    Abdominal pain    Abnormal involuntary movements    Cataract, bilateral    Chronic pain syndrome    Closed wedge fracture of thoracic vertebra with delayed healing    COPD, mild (HCC)    Generalized weakness    HTN (hypertension)    Hyponatremia    Irritable bowel syndrome without diarrhea    Osteoporosis    Thoracic radiculopathy    Closed fracture of third lumbar vertebra with routine healing    Long-term current use of opiate analgesic    Uncomplicated opioid dependence ([facility] [address] )    Movement disorder    Abdominal aortic aneurysm (AAA) without rupture ([facility] [address] )    Tobacco abuse    Anxiety    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[facility])    Preoperative clearance       Past Medical History:   Diagnosis Date    Abdominal aortic aneurysm (AAA) 3 0 cm to 5 0 cm in diameter in female ([facility] [address] ) 06/08/2018    4 9 cm     Abdominal pain 4/28/2016    Anxiety     Chronic obstructive lung disease (HCC)     Compression fracture of thoracic vertebra (HCC)     last assessed 2/20/17    Depression     Dysthymia     Failure to thrive in adult     Fracture of lumbar vertebra (HCC)     Hypertension     benign essential    Hyponatremia     Pressure injury of skin Past Surgical History:   Procedure Laterality Date    APPENDECTOMY      HYSTERECTOMY      IR VISCERAL ANGIOGRAPHY / INTERVENTION  2019    ID VASCULAR SURGERY PROCEDURE UNLIST N/A 2019    Procedure: ARTERIOGRAM - mesenteric angiogram with insertion of mesenteric artery stent and Aortogram;  Surgeon: Virgen Esquivel MD;  Location: [facility];  Service: Vascular       Family History   Problem Relation Age of Onset    Cirrhosis Mother         hepatic    Bone cancer Father        Social History     Occupational History    Not on file   Tobacco Use    Smoking status: Former Smoker     Last attempt to quit: 1/15/2019     Years since quittin 6    Smokeless tobacco: Never Used   Substance and Sexual Activity    Alcohol use: Not Currently     Frequency: Never     Binge frequency: Never     Comment: conflicting both occasionally an no use per [employer]    Drug use: No    Sexual activity: Not on file       Current Outpatient Medications on File Prior to Visit   Medication Sig    Calcium Carb-Cholecalciferol (CALCIUM 600 + D) 600-200 MG-UNIT TABS Take by mouth daily     clonazePAM (KlonoPIN) 0 5 mg tablet Take 1 tablet (0 5 mg total) by mouth 3 (three) times a day as needed for seizures    clopidogrel (PLAVIX) 75 mg tablet Take 1 tablet (75 mg total) by mouth daily    fentaNYL (DURAGESIC) 25 mcg/hr Place 1 patch q72hrs    gabapentin (NEURONTIN) 300 mg capsule TAKE 1 CAPSULE THREE TIMES A DAY    lactulose 10 g/15 mL Take 30 cc po bid prn    Multiple Vitamins-Minerals (MULTIVITAMIN ADULT PO) Take by mouth daily     Naloxone HCl 4 MG/0 1ML LIQD Apply 1 spray in each nostril in the event of overdose from pain medication      ondansetron (ZOFRAN-ODT) 4 mg disintegrating tablet TAKE 1 TABLET BY MOUTH EVERY 8 HOURS AS NEEDED FOR NAUSEA AND VOMITING    oxyCODONE-acetaminophen (PERCOCET) 7 5-325 MG per tablet Take 1 tablet by mouth every 8 (eight) hours as needed for moderate pain for up to 30 daysMax Daily Amount: 3 tablets    Probiotic Product (ALIGN) 4 MG CAPS daily     Psyllium (METAMUCIL) 28 3 % POWD Take by mouth daily     traZODone (DESYREL) 50 mg tablet TAKE 1 TABLET (50 MG TOTAL) BY MOUTH DAILY AT BEDTIME    Naloxegol Oxalate (MOVANTIK) 12 5 MG TABS Take 1 tablet (12 5 mg total) by mouth daily for 30 days     No current facility-administered medications on file prior to visit  No Known Allergies    Physical Exam:    /68   Pulse 100   Resp 16   Wt 35 8 kg (79 lb)   BMI 15 43 kg/m²     Constitutional:normal, well developed, well nourished, alert, in no distress and non-toxic and no overt pain behavior    Eyes:anicteric  HEENT:grossly intact  Neck:supple, symmetric, trachea midline and no masses   Pulmonary:even and unlabored  Cardiovascular:No edema or pitting edema present  Skin:Normal without rashes or lesions and well hydrated  Psychiatric:Mood and affect appropriate  Neurologic:Cranial Nerves II-XII grossly intact  Musculoskeletal:  Patient in wheelchair    Imaging

## 2019-09-12 ENCOUNTER — HOSPITAL ENCOUNTER (OUTPATIENT)
Facility: HOSPITAL | Age: 79
Setting detail: OBSERVATION
Discharge: HOSPICE | End: 2019-09-13
Attending: EMERGENCY MEDICINE | Admitting: STUDENT IN AN ORGANIZED HEALTH CARE EDUCATION/TRAINING PROGRAM
Payer: MEDICARE

## 2019-09-12 DIAGNOSIS — K62.5 RECTAL BLEEDING: Primary | ICD-10-CM

## 2019-09-12 DIAGNOSIS — K22.3 ESOPHAGEAL RUPTURE: ICD-10-CM

## 2019-09-12 DIAGNOSIS — Z71.89 ENCOUNTER FOR HOSPICE CARE DISCUSSION: ICD-10-CM

## 2019-09-12 DIAGNOSIS — J98.2 PNEUMOMEDIASTINUM (HCC): ICD-10-CM

## 2019-09-12 PROCEDURE — 36415 COLL VENOUS BLD VENIPUNCTURE: CPT | Performed by: EMERGENCY MEDICINE

## 2019-09-12 PROCEDURE — 85027 COMPLETE CBC AUTOMATED: CPT | Performed by: EMERGENCY MEDICINE

## 2019-09-12 PROCEDURE — 85007 BL SMEAR W/DIFF WBC COUNT: CPT | Performed by: EMERGENCY MEDICINE

## 2019-09-12 PROCEDURE — 93005 ELECTROCARDIOGRAM TRACING: CPT

## 2019-09-12 PROCEDURE — 86850 RBC ANTIBODY SCREEN: CPT | Performed by: EMERGENCY MEDICINE

## 2019-09-12 PROCEDURE — 86900 BLOOD TYPING SEROLOGIC ABO: CPT | Performed by: EMERGENCY MEDICINE

## 2019-09-12 PROCEDURE — 85610 PROTHROMBIN TIME: CPT | Performed by: EMERGENCY MEDICINE

## 2019-09-12 PROCEDURE — 80053 COMPREHEN METABOLIC PANEL: CPT | Performed by: EMERGENCY MEDICINE

## 2019-09-12 PROCEDURE — 99285 EMERGENCY DEPT VISIT HI MDM: CPT

## 2019-09-12 PROCEDURE — 86920 COMPATIBILITY TEST SPIN: CPT

## 2019-09-12 PROCEDURE — 86901 BLOOD TYPING SEROLOGIC RH(D): CPT | Performed by: EMERGENCY MEDICINE

## 2019-09-13 ENCOUNTER — APPOINTMENT (EMERGENCY)
Dept: CT IMAGING | Facility: HOSPITAL | Age: 79
End: 2019-09-13
Payer: MEDICARE

## 2019-09-13 VITALS
BODY MASS INDEX: 16.58 KG/M2 | DIASTOLIC BLOOD PRESSURE: 77 MMHG | TEMPERATURE: 99.3 F | HEIGHT: 60 IN | RESPIRATION RATE: 18 BRPM | SYSTOLIC BLOOD PRESSURE: 127 MMHG | HEART RATE: 97 BPM | OXYGEN SATURATION: 94 % | WEIGHT: 84.44 LBS

## 2019-09-13 PROBLEM — E43 SEVERE PROTEIN-CALORIE MALNUTRITION (HCC): Status: ACTIVE | Noted: 2019-09-13

## 2019-09-13 PROBLEM — K22.3 ESOPHAGEAL RUPTURE: Status: ACTIVE | Noted: 2019-09-13

## 2019-09-13 PROBLEM — J98.2 PNEUMOMEDIASTINUM (HCC): Status: ACTIVE | Noted: 2019-09-13

## 2019-09-13 PROBLEM — Z72.0 TOBACCO ABUSE: Status: RESOLVED | Noted: 2018-10-22 | Resolved: 2019-09-13

## 2019-09-13 LAB
ABO GROUP BLD: NORMAL
ALBUMIN SERPL BCP-MCNC: 3 G/DL (ref 3.5–5)
ALP SERPL-CCNC: 75 U/L (ref 46–116)
ALT SERPL W P-5'-P-CCNC: 9 U/L (ref 12–78)
ANION GAP SERPL CALCULATED.3IONS-SCNC: 8 MMOL/L (ref 4–13)
AST SERPL W P-5'-P-CCNC: 20 U/L (ref 5–45)
ATRIAL RATE: 74 BPM
BASOPHILS # BLD MANUAL: 0 THOUSAND/UL (ref 0–0.1)
BASOPHILS NFR MAR MANUAL: 0 % (ref 0–1)
BILIRUB SERPL-MCNC: 0.4 MG/DL (ref 0.2–1)
BLD GP AB SCN SERPL QL: NEGATIVE
BUN SERPL-MCNC: 19 MG/DL (ref 5–25)
CALCIUM SERPL-MCNC: 9.3 MG/DL (ref 8.3–10.1)
CHLORIDE SERPL-SCNC: 90 MMOL/L (ref 100–108)
CO2 SERPL-SCNC: 29 MMOL/L (ref 21–32)
CREAT SERPL-MCNC: 1.32 MG/DL (ref 0.6–1.3)
EOSINOPHIL # BLD MANUAL: 0 THOUSAND/UL (ref 0–0.4)
EOSINOPHIL NFR BLD MANUAL: 0 % (ref 0–6)
ERYTHROCYTE [DISTWIDTH] IN BLOOD BY AUTOMATED COUNT: 13.1 % (ref 11.6–15.1)
GFR SERPL CREATININE-BSD FRML MDRD: 38 ML/MIN/1.73SQ M
GLUCOSE SERPL-MCNC: 146 MG/DL (ref 65–140)
HCT VFR BLD AUTO: 26.2 % (ref 34.8–46.1)
HGB BLD-MCNC: 8.4 G/DL (ref 11.5–15.4)
INR PPP: 1.32 (ref 0.84–1.19)
LACTATE SERPL-SCNC: 1.9 MMOL/L (ref 0.5–2)
LACTATE SERPL-SCNC: 2.1 MMOL/L (ref 0.5–2)
LYMPHOCYTES # BLD AUTO: 0.84 THOUSAND/UL (ref 0.6–4.47)
LYMPHOCYTES # BLD AUTO: 4 % (ref 14–44)
MCH RBC QN AUTO: 30.2 PG (ref 26.8–34.3)
MCHC RBC AUTO-ENTMCNC: 32.1 G/DL (ref 31.4–37.4)
MCV RBC AUTO: 94 FL (ref 82–98)
METAMYELOCYTES NFR BLD MANUAL: 1 % (ref 0–1)
MONOCYTES # BLD AUTO: 1.26 THOUSAND/UL (ref 0–1.22)
MONOCYTES NFR BLD: 6 % (ref 4–12)
NEUTROPHILS # BLD MANUAL: 18.24 THOUSAND/UL (ref 1.85–7.62)
NEUTS BAND NFR BLD MANUAL: 8 % (ref 0–8)
NEUTS SEG NFR BLD AUTO: 79 % (ref 43–75)
NRBC BLD AUTO-RTO: 0 /100 WBCS
P AXIS: 53 DEGREES
PLATELET # BLD AUTO: 225 THOUSANDS/UL (ref 149–390)
PLATELET BLD QL SMEAR: ADEQUATE
PMV BLD AUTO: 9.4 FL (ref 8.9–12.7)
POTASSIUM SERPL-SCNC: 4 MMOL/L (ref 3.5–5.3)
PR INTERVAL: 144 MS
PROT SERPL-MCNC: 6.5 G/DL (ref 6.4–8.2)
PROTHROMBIN TIME: 16.4 SECONDS (ref 11.6–14.5)
QRS AXIS: 60 DEGREES
QRSD INTERVAL: 86 MS
QT INTERVAL: 382 MS
QTC INTERVAL: 424 MS
RBC # BLD AUTO: 2.78 MILLION/UL (ref 3.81–5.12)
RH BLD: NEGATIVE
SMUDGE CELLS BLD QL SMEAR: PRESENT
SODIUM SERPL-SCNC: 127 MMOL/L (ref 136–145)
SPECIMEN EXPIRATION DATE: NORMAL
T WAVE AXIS: 67 DEGREES
TOTAL CELLS COUNTED SPEC: 100
VARIANT LYMPHS # BLD AUTO: 2 %
VENTRICULAR RATE: 74 BPM
WBC # BLD AUTO: 20.97 THOUSAND/UL (ref 4.31–10.16)

## 2019-09-13 PROCEDURE — 93010 ELECTROCARDIOGRAM REPORT: CPT | Performed by: INTERNAL MEDICINE

## 2019-09-13 PROCEDURE — 83605 ASSAY OF LACTIC ACID: CPT | Performed by: EMERGENCY MEDICINE

## 2019-09-13 PROCEDURE — 99205 OFFICE O/P NEW HI 60 MIN: CPT | Performed by: INTERNAL MEDICINE

## 2019-09-13 PROCEDURE — 99285 EMERGENCY DEPT VISIT HI MDM: CPT | Performed by: EMERGENCY MEDICINE

## 2019-09-13 PROCEDURE — 99219 PR INITIAL OBSERVATION CARE/DAY 50 MINUTES: CPT | Performed by: INTERNAL MEDICINE

## 2019-09-13 PROCEDURE — 96365 THER/PROPH/DIAG IV INF INIT: CPT

## 2019-09-13 PROCEDURE — 36415 COLL VENOUS BLD VENIPUNCTURE: CPT | Performed by: EMERGENCY MEDICINE

## 2019-09-13 PROCEDURE — 99217 PR OBSERVATION CARE DISCHARGE MANAGEMENT: CPT | Performed by: STUDENT IN AN ORGANIZED HEALTH CARE EDUCATION/TRAINING PROGRAM

## 2019-09-13 PROCEDURE — 87040 BLOOD CULTURE FOR BACTERIA: CPT | Performed by: EMERGENCY MEDICINE

## 2019-09-13 PROCEDURE — 96375 TX/PRO/DX INJ NEW DRUG ADDON: CPT

## 2019-09-13 PROCEDURE — 74177 CT ABD & PELVIS W/CONTRAST: CPT

## 2019-09-13 RX ORDER — DIPHENHYDRAMINE HCL 25 MG
25 TABLET ORAL DAILY PRN
Qty: 30 TABLET | Refills: 0 | Status: SHIPPED | OUTPATIENT
Start: 2019-09-13 | End: 2019-09-13 | Stop reason: HOSPADM

## 2019-09-13 RX ORDER — MORPHINE SULFATE 100 MG/5ML
10 SOLUTION ORAL EVERY 2 HOUR PRN
Qty: 30 ML | Refills: 0 | Status: SHIPPED | OUTPATIENT
Start: 2019-09-13 | End: 2019-09-13 | Stop reason: HOSPADM

## 2019-09-13 RX ORDER — LORAZEPAM 2 MG/ML
0.5 INJECTION INTRAMUSCULAR EVERY 2 HOUR PRN
Status: DISCONTINUED | OUTPATIENT
Start: 2019-09-13 | End: 2019-09-13 | Stop reason: HOSPADM

## 2019-09-13 RX ORDER — OXYCODONE AND ACETAMINOPHEN 10; 325 MG/1; MG/1
1 TABLET ORAL EVERY 4 HOURS PRN
Qty: 18 TABLET | Refills: 0 | Status: SHIPPED | OUTPATIENT
Start: 2019-09-13 | End: 2019-09-13 | Stop reason: HOSPADM

## 2019-09-13 RX ORDER — ACETAMINOPHEN 325 MG/1
650 TABLET ORAL EVERY 6 HOURS PRN
Status: DISCONTINUED | OUTPATIENT
Start: 2019-09-13 | End: 2019-09-13 | Stop reason: HOSPADM

## 2019-09-13 RX ORDER — OXYCODONE HYDROCHLORIDE AND ACETAMINOPHEN 5; 325 MG/1; MG/1
2 TABLET ORAL EVERY 4 HOURS PRN
Status: DISCONTINUED | OUTPATIENT
Start: 2019-09-13 | End: 2019-09-13 | Stop reason: HOSPADM

## 2019-09-13 RX ORDER — ALBUTEROL SULFATE 90 UG/1
2 AEROSOL, METERED RESPIRATORY (INHALATION) EVERY 4 HOURS PRN
Status: DISCONTINUED | OUTPATIENT
Start: 2019-09-13 | End: 2019-09-13 | Stop reason: HOSPADM

## 2019-09-13 RX ORDER — FENTANYL CITRATE 50 UG/ML
25 INJECTION, SOLUTION INTRAMUSCULAR; INTRAVENOUS ONCE
Status: COMPLETED | OUTPATIENT
Start: 2019-09-13 | End: 2019-09-13

## 2019-09-13 RX ORDER — TRAZODONE HYDROCHLORIDE 50 MG/1
50 TABLET ORAL
Status: DISCONTINUED | OUTPATIENT
Start: 2019-09-13 | End: 2019-09-13 | Stop reason: HOSPADM

## 2019-09-13 RX ORDER — LORAZEPAM 2 MG/ML
1 CONCENTRATE ORAL EVERY 4 HOURS PRN
Qty: 30 ML | Refills: 0 | Status: SHIPPED | OUTPATIENT
Start: 2019-09-13 | End: 2019-09-13 | Stop reason: HOSPADM

## 2019-09-13 RX ORDER — GABAPENTIN 300 MG/1
300 CAPSULE ORAL 3 TIMES DAILY
Status: DISCONTINUED | OUTPATIENT
Start: 2019-09-13 | End: 2019-09-13 | Stop reason: HOSPADM

## 2019-09-13 RX ORDER — CLONAZEPAM 0.5 MG/1
0.5 TABLET ORAL 3 TIMES DAILY PRN
Status: DISCONTINUED | OUTPATIENT
Start: 2019-09-13 | End: 2019-09-13 | Stop reason: HOSPADM

## 2019-09-13 RX ORDER — OXYCODONE AND ACETAMINOPHEN 10; 325 MG/1; MG/1
1 TABLET ORAL EVERY 6 HOURS PRN
Qty: 18 TABLET | Refills: 0 | Status: SHIPPED | OUTPATIENT
Start: 2019-09-13 | End: 2019-09-23

## 2019-09-13 RX ORDER — FENTANYL 25 UG/H
25 PATCH TRANSDERMAL
Status: DISCONTINUED | OUTPATIENT
Start: 2019-09-13 | End: 2019-09-13 | Stop reason: HOSPADM

## 2019-09-13 RX ORDER — FENTANYL CITRATE 50 UG/ML
25 INJECTION, SOLUTION INTRAMUSCULAR; INTRAVENOUS EVERY 2 HOUR PRN
Status: DISCONTINUED | OUTPATIENT
Start: 2019-09-13 | End: 2019-09-13

## 2019-09-13 RX ORDER — MORPHINE SULFATE 4 MG/ML
4 INJECTION, SOLUTION INTRAMUSCULAR; INTRAVENOUS
Status: DISCONTINUED | OUTPATIENT
Start: 2019-09-13 | End: 2019-09-13 | Stop reason: HOSPADM

## 2019-09-13 RX ADMIN — IOHEXOL 100 ML: 350 INJECTION, SOLUTION INTRAVENOUS at 00:22

## 2019-09-13 RX ADMIN — GABAPENTIN 300 MG: 300 CAPSULE ORAL at 10:41

## 2019-09-13 RX ADMIN — OXYCODONE HYDROCHLORIDE AND ACETAMINOPHEN 2 TABLET: 5; 325 TABLET ORAL at 10:41

## 2019-09-13 RX ADMIN — FENTANYL 25 MCG: 25 PATCH, EXTENDED RELEASE TRANSDERMAL at 10:41

## 2019-09-13 RX ADMIN — MORPHINE SULFATE 4 MG: 4 INJECTION INTRAVENOUS at 14:54

## 2019-09-13 RX ADMIN — CLONAZEPAM 0.5 MG: 0.5 TABLET ORAL at 14:54

## 2019-09-13 RX ADMIN — FENTANYL CITRATE 25 MCG: 50 INJECTION INTRAMUSCULAR; INTRAVENOUS at 02:18

## 2019-09-13 RX ADMIN — PIPERACILLIN SODIUM,TAZOBACTAM SODIUM 3.38 G: 3; .375 INJECTION, POWDER, FOR SOLUTION INTRAVENOUS at 01:15

## 2019-09-13 NOTE — ED PROVIDER NOTES
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 on aspirin, who presents for evaluation of bright red blood per rectum as well as abdominal pain  Symptoms been present for the last 4 days  Patient reports numerous loose bowel movements a day with bright red blood filling the toilet bowl  Endorses generalized pain throughout her abdomen, pain is mild when lying flat, severe when she sits up or moves around  Pain was so bad today that she was doubled over and had a hard time moving from her bed to her bedside commode  Patient is largely immobile at baseline, moves just from her bed to her bedside commode  She denies nausea or vomiting  No fevers or chills  Denies chest pain or difficulty breathing  Prior to Admission Medications   Prescriptions Last Dose Informant Patient Reported? Taking? Calcium Carb-Cholecalciferol (CALCIUM 600 + D) 600-200 MG-UNIT TABS  Spouse/Significant Other Yes Yes   Sig: Take by mouth daily    Multiple Vitamins-Minerals (MULTIVITAMIN ADULT PO)  Spouse/Significant Other Yes Yes   Sig: Take by mouth daily    Naloxegol Oxalate (MOVANTIK) 12 5 MG TABS   No No   Sig: Take 1 tablet (12 5 mg total) by mouth daily for 30 days   Naloxone HCl 4 MG/0 1ML LIQD  Spouse/Significant Other No Yes   Sig: Apply 1 spray in each nostril in the event of overdose from pain medication     Probiotic Product (ALIGN) 4 MG CAPS  Spouse/Significant Other Yes Yes   Sig: daily    Psyllium (METAMUCIL) 28 3 % POWD  Spouse/Significant Other Yes Yes   Sig: Take by mouth daily    clonazePAM (KlonoPIN) 0 5 mg tablet  Spouse/Significant Other No Yes   Sig: Take 1 tablet (0 5 mg total) by mouth 3 (three) times a day as needed for seizures   clopidogrel (PLAVIX) 75 mg tablet Not Taking at Unknown time Spouse/Significant Other No No   Sig: Take 1 tablet (75 mg total) by mouth daily   Patient not taking: Reported on 9/12/2019   fentaNYL (DURAGESIC) 25 mcg/hr   No Yes   Sig: Place 1 patch q72hrs   gabapentin (NEURONTIN) 300 mg capsule   No Yes   Sig: TAKE 1 CAPSULE THREE TIMES A DAY   lactulose 10 g/15 mL   No Yes   Sig: Take 30 cc po bid prn   metaxalone (SKELAXIN) 800 mg tablet   No Yes   Sig: Take 1 tablet (800 mg total) by mouth 3 (three) times a day   ondansetron (ZOFRAN-ODT) 4 mg disintegrating tablet   No Yes   Sig: TAKE 1 TABLET BY MOUTH EVERY 8 HOURS AS NEEDED FOR NAUSEA AND VOMITING   oxyCODONE-acetaminophen (PERCOCET)  mg per tablet   No Yes   Sig: Take 1 tablet by mouth every 8 (eight) hours as needed for moderate painMax Daily Amount: 3 tablets   traZODone (DESYREL) 50 mg tablet   No Yes   Sig: TAKE 1 TABLET (50 MG TOTAL) BY MOUTH DAILY AT BEDTIME      Facility-Administered Medications: None       Past Medical History:   Diagnosis Date    Abdominal aortic aneurysm (AAA) 3 0 cm to 5 0 cm in diameter in female (HCC) 06/08/2018    4 9 cm     Abdominal pain 4/28/2016    Anxiety     Chronic obstructive lung disease (HCC)     Compression fracture of thoracic vertebra (HCC)     last assessed 2/20/17    Depression     Dysthymia     Failure to thrive in adult     Fracture of lumbar vertebra (HCC)     Hypertension     benign essential    Hyponatremia     Pressure injury of skin        Past Surgical History:   Procedure Laterality Date    APPENDECTOMY      HYSTERECTOMY      IR VISCERAL ANGIOGRAPHY / INTERVENTION  5/16/2019    GA VASCULAR SURGERY PROCEDURE UNLIST N/A 5/16/2019    Procedure: ARTERIOGRAM - mesenteric angiogram with insertion of mesenteric artery stent and Aortogram;  Surgeon: William Tinsley MD;  Location: [facility];  Service: Vascular       Family History   Problem Relation Age of Onset    Cirrhosis Mother         hepatic    Bone cancer Father      I have reviewed and agree with the history as documented  Social History     Tobacco Use    Smoking status: Former Smoker     Last attempt to quit: 1/15/2019     Years since quittin 6    Smokeless tobacco: Never Used   Substance Use Topics    Alcohol use: Not Currently     Frequency: Never     Binge frequency: Never     Comment: conflicting both occasionally an no use per [employer]    Drug use: No        Review of Systems   Constitutional: Negative for chills and fever  HENT: Negative for congestion  Eyes: Negative for visual disturbance  Respiratory: Negative for cough and shortness of breath  Cardiovascular: Negative for chest pain and leg swelling  Gastrointestinal: Positive for abdominal pain, blood in stool and diarrhea  Negative for nausea and vomiting  Genitourinary: Negative for dysuria and frequency  Musculoskeletal: Negative for arthralgias, back pain, neck pain and neck stiffness  Skin: Negative for rash  Neurological: Negative for weakness, numbness and headaches  Psychiatric/Behavioral: Negative for agitation, behavioral problems and confusion  Physical Exam  Physical Exam   Constitutional: She is oriented to person, place, and time  She appears well-developed and well-nourished  No distress  HENT:   Head: Normocephalic and atraumatic  Right Ear: External ear normal    Left Ear: External ear normal    Nose: Nose normal    Mouth/Throat: Oropharynx is clear and moist    Eyes: Conjunctivae are normal    Neck: Normal range of motion  Neck supple  Cardiovascular: Normal rate, regular rhythm and normal heart sounds  Exam reveals no gallop and no friction rub  No murmur heard  Pulmonary/Chest: Effort normal and breath sounds normal  No respiratory distress  She has no wheezes  She has no rales  Abdominal: Soft  Bowel sounds are normal  She exhibits no distension   There is tenderness (Diffuse)  There is no guarding  Genitourinary:   Genitourinary Comments: Bright red blood per rectum, no palpable masses   Musculoskeletal: Normal range of motion  She exhibits no edema or deformity  Neurological: She is alert and oriented to person, place, and time  She exhibits normal muscle tone  Skin: Skin is warm and dry  She is not diaphoretic         Vital Signs  ED Triage Vitals   Temperature Pulse Respirations Blood Pressure SpO2   09/12/19 2310 09/12/19 2310 09/12/19 2310 09/12/19 2310 09/13/19 0030   98 °F (36 7 °C) 79 16 107/57 95 %      Temp Source Heart Rate Source Patient Position - Orthostatic VS BP Location FiO2 (%)   09/12/19 2310 09/12/19 2310 09/13/19 0300 09/12/19 2310 --   Oral Monitor Lying Left arm       Pain Score       09/13/19 0300       No Pain           Vitals:    09/13/19 0230 09/13/19 0300 09/13/19 0400 09/13/19 0438   BP: 95/52 95/53 (!) 84/52 120/73   Pulse: 75 77 73 85   Patient Position - Orthostatic VS:  Lying Lying          Visual Acuity      ED Medications  Medications   fentanyl citrate (PF) 100 MCG/2ML 25 mcg (has no administration in time range)   piperacillin-tazobactam (ZOSYN) 3 375 g in sodium chloride 0 9 % 50 mL IVPB (has no administration in time range)   traZODone (DESYREL) tablet 50 mg (has no administration in time range)   clonazePAM (KlonoPIN) tablet 0 5 mg (has no administration in time range)   gabapentin (NEURONTIN) capsule 300 mg (has no administration in time range)   acetaminophen (TYLENOL) tablet 650 mg (has no administration in time range)   albuterol (PROVENTIL HFA,VENTOLIN HFA) inhaler 2 puff (has no administration in time range)   iohexol (OMNIPAQUE) 350 MG/ML injection (MULTI-DOSE) 100 mL (100 mL Intravenous Given 9/13/19 0022)   piperacillin-tazobactam (ZOSYN) 3 375 g in sodium chloride 0 9 % 50 mL IVPB (0 g Intravenous Stopped 9/13/19 0217)   fentanyl citrate (PF) 100 MCG/2ML 25 mcg (25 mcg Intravenous Given 9/13/19 0218)       Diagnostic Studies  Results Reviewed     Procedure Component Value Units Date/Time    Lactic acid x2 Q2H [[id number]]  (Normal) Collected:  09/13/19 0311    Lab Status:  Final result Specimen:  Blood from Arm, Right Updated:  09/13/19 0337     LACTIC ACID 1 9 mmol/L     Narrative:       Result may be elevated if tourniquet was used during collection  Lactic acid x2 Q2H [[id number]]  (Abnormal) Collected:  09/13/19 0107    Lab Status:  Final result Specimen:  Blood from Arm, Right Updated:  09/13/19 0157     LACTIC ACID 2 1 mmol/L     Narrative:       Result may be elevated if tourniquet was used during collection  Blood culture #2 [[id number]] Collected:  09/13/19 0107    Lab Status: In process Specimen:  Blood from Arm, Left Updated:  09/13/19 0115    Blood culture #1 [[id number]] Collected:  09/13/19 0107    Lab Status:   In process Specimen:  Blood from Arm, Right Updated:  09/13/19 0114    CBC and differential [[id number]]  (Abnormal) Collected:  09/12/19 2347    Lab Status:  Final result Specimen:  Blood from Arm, Right Updated:  09/13/19 0025     WBC 20 97 Thousand/uL      RBC 2 78 Million/uL      Hemoglobin 8 4 g/dL      Hematocrit 26 2 %      MCV 94 fL      MCH 30 2 pg      MCHC 32 1 g/dL      RDW 13 1 %      MPV 9 4 fL      Platelets 746 Thousands/uL      nRBC 0 /100 WBCs     Protime-INR [[id number]]  (Abnormal) Collected:  09/12/19 2347    Lab Status:  Final result Specimen:  Blood from Arm, Right Updated:  09/13/19 0016     Protime 16 4 seconds      INR 1 32    Comprehensive metabolic panel [[id number]]  (Abnormal) Collected:  09/12/19 2347    Lab Status:  Final result Specimen:  Blood from Arm, Right Updated:  09/13/19 0011     Sodium 127 mmol/L      Potassium 4 0 mmol/L      Chloride 90 mmol/L      CO2 29 mmol/L      ANION GAP 8 mmol/L      BUN 19 mg/dL      Creatinine 1 32 mg/dL      Glucose 146 mg/dL      Calcium 9 3 mg/dL      AST 20 U/L      ALT 9 U/L      Alkaline Phosphatase 75 U/L      Total Protein 6 5 g/dL Albumin 3 0 g/dL      Total Bilirubin 0 40 mg/dL      eGFR 38 ml/min/1 73sq m     Narrative:       [place] guidelines for Chronic Kidney Disease (CKD):     Stage 1 with normal or high GFR (GFR > 90 mL/min/1 73 square meters)    Stage 2 Mild CKD (GFR = 60-89 mL/min/1 73 square meters)    Stage 3A Moderate CKD (GFR = 45-59 mL/min/1 73 square meters)    Stage 3B Moderate CKD (GFR = 30-44 mL/min/1 73 square meters)    Stage 4 Severe CKD (GFR = 15-29 mL/min/1 73 square meters)    Stage 5 End Stage CKD (GFR <15 mL/min/1 73 square meters)  Note: GFR calculation is accurate only with a steady state creatinine                 CT abdomen pelvis with contrast   Final Result by Cynthia Jha DO (09/13 8928)      Diffuse pneumomediastinum and pneumoperitoneum likely due to ruptured bowel among other etiologies  Clinical correlation is recommended  I personally discussed this study with Emily Hernandez on 9/13/2019 at 12:49 AM                      Workstation performed: [id number]         CT recon only lumbar spine   Final Result by Cynthia Jha DO (09/13 9735)      Old compression deformities of the T11-L4 vertebral bodies  Workstation performed: [id number]                    Procedures  Procedures       ED Course           Identification of Seniors at Risk      Most Recent Value   (ISAR) Identification of Seniors at Risk   Before the illness or injury that brought you to the Emergency, did you need someone to help you on a regular basis? 0 Filed at: 09/12/2019 2304   In the last 24 hours, have you needed more help than usual?     Have you been hospitalized for one or more nights during the past 6 months?    In general, do you see well?    In general, do you have serious problems with your memory?    Do you take more than three different medications every day?      ISAR Score  0 Filed at: 09/12/2019 2302                          MDM  Number of Diagnoses or Management Options  Pneumomediastinum [facility]): new and requires workup  Rectal bleeding: new and requires workup  Diagnosis management comments: Patient appears fatigued, is afebrile and hemodynamically stable  She has diffuse tenderness to palpation of her abdomen but no guarding or peritonitis  She has bright red blood per rectum on rectal exam  Hgb is 8 4 from 12 7 1 month ago  Leukocytosis to 20 97  CMP with hyponatremia 127, creatinine 1 32  Lactic acidosis to 2 1  CT abdomen pelvis obtained which shows diffuse pneumomediastinum in pneumoperitoneum  Case discussed with Dr Monica José with General surgery, he suspects that patient more likely has an esophageal rupture rather than bowel perforation as most of her free air as retroperitoneal and in the mediastinum  If the patient wishes to undergo surgical intervention, recommends transport to [facility] for CT surgery evaluation  Findings were discussed extensively with the patient, her daughter who is a nurse, her , and an additional family member at bedside  They all feel that the patient is clinically frail and would not survive surgery, the patient does not wish to undergo surgery at this time  She understands that she will likely die from what is likely a perforated viscus if she does not undergo surgery  At this point, the patient and her family would like to focus on comfort measures only, with the exception of antibiotics, which they would like to continue  Blood cultures were obtained and patient was given 1 dose of Zosyn in the emergency department  She was given fentanyl for pain control  The family wishes to me with palliative care and hospice in the morning  Patient is DNR DNI, comfort measures only with the exception of antibiotics         Amount and/or Complexity of Data Reviewed  Clinical lab tests: ordered and reviewed  Tests in the radiology section of CPT®: ordered and reviewed  Review and summarize past medical records: yes  Discuss the patient with other providers: yes  Independent visualization of images, tracings, or specimens: yes    Patient Progress  Patient progress: stable           Disposition  Final diagnoses:   Rectal bleeding   Pneumomediastinum ([facility] [address] )     Time reflects when diagnosis was documented in both MDM as applicable and the Disposition within this note     Time User Action Codes Description Comment    9/13/2019  3:01 AM Antonio Mulligan Add [K62 5] Rectal bleeding     9/13/2019  3:01 AM Bisi Culver [J98 2] Pneumomediastinum ([facility] [address] )     9/13/2019  4:27 AM Vonda Koyanagi D Modify [J98 2] Pneumomediastinum ([facility] [address] )     9/13/2019  4:27 AM Obed Lopez Add [Z71 89] Encounter for hospice care discussion     9/13/2019  4:27 AM Magalis Giron Modify [J98 2] Pneumomediastinum [facility])       ED Disposition     ED Disposition Condition Date/Time Comment    Admit Stable Fri Sep 13, 2019  3:01 AM Case was discussed with ESTEBAN and the patient's admission status was agreed to be Admission Status: observation status to the service of Dr Ishmael Torres   Follow-up Information    None         Current Discharge Medication List      CONTINUE these medications which have NOT CHANGED    Details   Calcium Carb-Cholecalciferol (CALCIUM 600 + D) 600-200 MG-UNIT TABS Take by mouth daily       clonazePAM (KlonoPIN) 0 5 mg tablet Take 1 tablet (0 5 mg total) by mouth 3 (three) times a day as needed for seizures  Qty: 90 tablet, Refills: 3    Associated Diagnoses: Anxiety      fentaNYL (DURAGESIC) 25 mcg/hr Place 1 patch q72hrs  Qty: 10 patch, Refills: 0    Associated Diagnoses: Chronic pain syndrome      gabapentin (NEURONTIN) 300 mg capsule TAKE 1 CAPSULE THREE TIMES A DAY  Qty: 90 capsule, Refills: 2    Associated Diagnoses: Chronic pain disorder;  Thoracic radiculopathy; Closed wedge fracture of thoracic vertebra with delayed healing, unspecified thoracic vertebral level, subsequent encounter      lactulose 10 g/15 mL Take 30 cc po bid prn  Qty: 946 mL, Refills: 1    Associated Diagnoses: Constipation, unspecified constipation type      metaxalone (SKELAXIN) 800 mg tablet Take 1 tablet (800 mg total) by mouth 3 (three) times a day  Qty: 90 tablet, Refills: 0    Associated Diagnoses: Closed wedge compression fracture of third lumbar vertebra with routine healing, subsequent encounter; Osteoporosis with current pathological fracture with routine healing, unspecified osteoporosis type, subsequent encounter      Multiple Vitamins-Minerals (MULTIVITAMIN ADULT PO) Take by mouth daily       Naloxone HCl 4 MG/0 1ML LIQD Apply 1 spray in each nostril in the event of overdose from pain medication    Qty: 1 each, Refills: 1    Associated Diagnoses: Long-term current use of opiate analgesic      ondansetron (ZOFRAN-ODT) 4 mg disintegrating tablet TAKE 1 TABLET BY MOUTH EVERY 8 HOURS AS NEEDED FOR NAUSEA AND VOMITING  Qty: 30 tablet, Refills: 1    Associated Diagnoses: Nausea      oxyCODONE-acetaminophen (PERCOCET)  mg per tablet Take 1 tablet by mouth every 8 (eight) hours as needed for moderate painMax Daily Amount: 3 tablets  Qty: 90 tablet, Refills: 0    Associated Diagnoses: Chronic pain syndrome; Closed wedge compression fracture of third lumbar vertebra with routine healing, subsequent encounter      Probiotic Product (ALIGN) 4 MG CAPS daily       Psyllium (METAMUCIL) 28 3 % POWD Take by mouth daily       traZODone (DESYREL) 50 mg tablet TAKE 1 TABLET (50 MG TOTAL) BY MOUTH DAILY AT BEDTIME  Qty: 90 tablet, Refills: 1    Associated Diagnoses: Insomnia, unspecified type      clopidogrel (PLAVIX) 75 mg tablet Take 1 tablet (75 mg total) by mouth daily  Qty: 31 tablet, Refills: 2    Associated Diagnoses: 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HCC)      Naloxegol Oxalate (MOVANTIK) 12 5 MG TABS Take 1 tablet (12 5 mg total) by mouth daily for 30 days  Qty: 30 tablet, Refills: 2    Associated Diagnoses: Therapeutic opioid-induced constipation (OIC)           No discharge procedures on file      ED Provider  Electronically Signed by           Phil Dawkins MD  09/13/19 6741

## 2019-09-13 NOTE — SOCIAL WORK
Observation notice reviewed with patient and copy left at beside  Observation brochure provided  Signed copy placed in medical records  Cm met with the pt and family at bedside to discuss dcp  The pt resides with her  in a 1 story home with [facility] in [place]  The pt uses a walker and her  assist her with her daily living skills  The pt has no hx of VNA/STR  The pt fills her Rx at [facility] in [facility] and is able to afford her medication  The pt has no hx of MH/SA  The pt  is he POA  The pt is retired and her  transports her to and from [facility]  Dr Kortney Ram spoke with the family because the pt stated to the internalist that she would like to have surgery, which would require her to transfer to [facility]  The pt and family spoke with Dr Kortney Ram and discussed whether she wanted to have surgery or home hospice  The family has stated that she would like home hospice  Marielos spoke with the family about the need for hospital equipment and whether they needed a hospital bed  The family states that the pt would franko to be in their own bed  The family states that they do not want any equipment  The family states that the family would like to return to her home  Marielos contacted [address] [place] [address] and spoke with [facility]  She states that the referral has not come thru  She states that she will contact the main office and then she will be coming to the hospital and speaking with the family  CM updated hospice on the pt wishes  MARIELOS informed Dr Kortney Ram that the pt and family would like to return home today if possible  He prepared all the necessary pw  Pt referred to [address] [place] [address] hospice per her request   CM spoke with pt about medical equipment and the pt and family declined  The pt  states that he will transport her home at dc  Cm will follow pt until TX

## 2019-09-13 NOTE — ASSESSMENT & PLAN NOTE
· Will hold medications at this time except for antibiotics  · See plan under pneumomediastinum  · Palliative care consult

## 2019-09-13 NOTE — UTILIZATION REVIEW
Initial Clinical Review    Admission: Date/Time/Statement: OBS 9/13  0302 converted to IP on 9/13 @  1037 for continued treatment of pneumomediastinum converted back to OBS 9/13  @ 1412 pt for comfort care       Orders Placed This Encounter   Procedures    Place in Observation (expected length of stay for this patient is less than two midnights)     Standing Status:   Standing     Number of Occurrences:   1     Order Specific Question:   Admitting Physician     Answer:   Sergio Smith [[id number]]     Order Specific Question:   Level of Care     Answer:   Med Surg [[id number]]     ED Arrival Information     Expected Arrival Acuity Means of Arrival Escorted By Service Admission Type    - 9/12/2019 23:03 [facility] [address] Emergency    Arrival Complaint    ABD PAIN        Chief Complaint   Patient presents with    Rectal Bleeding     As per EMS, patient reports diarrhea with bright red blood in stool for the past 4 days, patient takes baby aspirin, has hx of abdominal aneurysm      Assessment/Plan: 78 y o  female who has known history of infrarenal aortic aneurysm and presented to the ED with complaint of abdominal pain along with rectal bleeding and diarrhea  On evaluation in the ED, patient was found to have free air in the abdomen on imaging which was concerning for pneumomediastinum  She also had marked leukocytosis and anemia, along with hyponatremia  Patient had discussion with surgery along with family present and it was agreed upon that patient would not tolerate surgery, and she did not desire surgical approach at this time  This, decision was made to pursue a more palliative based approach with tentative plan to continue antibiotics in the short term until patient could be evaluated by palliative care     Pneumomediastinum ([facility] [address] )  Assessment & Plan  After discussion with family and surgery, patient and family have elected to undergo comfort based approach  · Will continue antibiotics temporarily per family request  · Continue comfort care  · Will consult palliative Care for hospice evaluation  · Continue fentanyl p r n  Pending further input from palliative Care for pain management   Anxiety  Assessment & Plan  · Continue trazodone, Klonopin   Abdominal aortic aneurysm (AAA) without rupture (HCC)  Assessment & Plan  · Will hold medications at this time except for antibiotics  · See plan under pneumomediastinum  · Palliative care consult   Hyponatremia  Assessment & Plan  · Will hold off on additional blood work for now as patient is not demonstrating neuro symptoms and is pursuing comfort based approach   COPD, mild ([address] )  Assessment & Plan  · Will continue p r n  Albuterol in the meantime pending consult by palliative care   Anticipated Length of Stay:  Patient will be admitted on an Observation basis with an anticipated length of stay of < 2 midnights     Justification for Hospital Stay: Please see detailed plans noted above      ED Triage Vitals   Temperature Pulse Respirations Blood Pressure SpO2   09/12/19 2310 09/12/19 2310 09/12/19 2310 09/12/19 2310 09/13/19 0030   98 °F (36 7 °C) 79 16 107/57 95 %      Temp Source Heart Rate Source Patient Position - Orthostatic VS BP Location FiO2 (%)   09/12/19 2310 09/12/19 2310 09/13/19 0300 09/12/19 2310 --   Oral Monitor Lying Left arm       Pain Score       09/13/19 0300       No Pain        Wt Readings from Last 1 Encounters:   09/12/19 38 3 kg (84 lb 7 oz)     Additional Vital Signs:   09/13/19 07:33:47  99 3 °F (37 4 °C)  97  18  127/77  94  94 %       09/13/19 04:38:49  98 1 °F (36 7 °C)  85  16  120/73  89  92 %  None (Room air)     09/13/19 0400    73  18  84/52Abnormal     98 %  None (Room air)  Lying   09/13/19 0319            96 %       09/13/19 0300    77  18  95/53    96 %  Nasal cannula  Lying   09/13/19 0230    75  19  95/52    97 %  Nasal cannula     09/13/19 0130    79  17  110/66    97 %  Nasal cannula   09/13/19 0126    80  19  108/65    94 %  Nasal cannula     09/13/19 0118    80  18  97/62    96 %  Nasal cannula     09/13/19 0030    78  20  126/61    95 %  Nasal cannula     09/12/19 2310  98 °F (36 7 °C)  79  16  107/57      Nasal cannula         Pertinent Labs/Diagnostic Test Results:   9/12 CT abd -    Diffuse pneumomediastinum and pneumoperitoneum likely due to ruptured bowel among other etiologies    Clinical correlation is recommended  9/12 CT lumbar spine- Old compression deformities of the T11-L4 vertebral bodies      Results from last 7 days   Lab Units 09/12/19  2347   WBC Thousand/uL 20 97*   HEMOGLOBIN g/dL 8 4*   HEMATOCRIT % 26 2*   PLATELETS Thousands/uL 225   BANDS PCT % 8     Results from last 7 days   Lab Units 09/12/19  2347   SODIUM mmol/L 127*   POTASSIUM mmol/L 4 0   CHLORIDE mmol/L 90*   CO2 mmol/L 29   ANION GAP mmol/L 8   BUN mg/dL 19   CREATININE mg/dL 1 32*   EGFR ml/min/1 73sq m 38   CALCIUM mg/dL 9 3     Results from last 7 days   Lab Units 09/12/19  2347   AST U/L 20   ALT U/L 9*   ALK PHOS U/L 75   TOTAL PROTEIN g/dL 6 5   ALBUMIN g/dL 3 0*   TOTAL BILIRUBIN mg/dL 0 40     Results from last 7 days   Lab Units 09/12/19  2347   GLUCOSE RANDOM mg/dL 146*     Results from last 7 days   Lab Units 09/12/19  2347   PROTIME seconds 16 4*   INR  1 32*     Results from last 7 days   Lab Units 09/13/19  0311 09/13/19  0107   LACTIC ACID mmol/L 1 9 2 1*     Results from last 7 days   Lab Units 09/12/19  2347   TOTAL COUNTED  100     ED Treatment:   Medication Administration from 09/12/2019 2303 to 09/13/2019 0423       Date/Time Order Dose Route Action     09/13/2019 0115 piperacillin-tazobactam (ZOSYN) 3 375 g in sodium chloride 0 9 % 50 mL IVPB 3 375 g Intravenous New Bag     09/13/2019 0218 fentanyl citrate (PF) 100 MCG/2ML 25 mcg 25 mcg Intravenous Given        Past Medical History:   Diagnosis Date    Abdominal aortic aneurysm (AAA) 3 0 cm to 5 0 cm in diameter in female [facility]) 06/08/2018    4 9 cm     Abdominal pain 4/28/2016    Anxiety     Chronic obstructive lung disease (HCC)     Compression fracture of thoracic vertebra (HCC)     last assessed 2/20/17    Depression     Dysthymia     Failure to thrive in adult     Fracture of lumbar vertebra (HCC)     Hypertension     benign essential    Hyponatremia     Pressure injury of skin      Present on Admission:   Pneumomediastinum ([facility] [address] )   Abdominal aortic aneurysm (AAA) without rupture ([facility] [address] )   COPD, mild (HCC)   Hyponatremia   Anxiety      Admitting Diagnosis: Pneumomediastinum ([facility] [address] ) [J98 2]  Rectal bleeding [K62 5]  Abdominal pain [R10 9]  Age/Sex: 78 y o  female  Admission Orders:    Current Facility-Administered Medications:  acetaminophen 650 mg Oral Q6H PRN    albuterol 2 puff Inhalation Q4H PRN    clonazePAM 0 5 mg Oral TID PRN    fentanyl citrate (PF) 25 mcg Intravenous Q2H PRN    gabapentin 300 mg Oral TID    piperacillin-tazobactam 3 375 g Intravenous Q8H    traZODone 50 mg Oral HS      Up w/ assist   SCD  Reg diet       IP CONSULT TO [address] Utilization Review Department  Phone: [phone]; Fax [phone]  [email or web address]  ATTENTION: Please call with any questions or concerns to [phone]  and carefully listen to the prompts so that you are directed to the right person  Send all requests for admission clinical reviews, approved or denied determinations and any other requests to fax [phone]   All voicemails are confidential

## 2019-09-13 NOTE — ASSESSMENT & PLAN NOTE
After discussion with family and surgery, patient and family have elected to undergo comfort based approach  · Will continue antibiotics temporarily per family request  · Continue comfort care  · Will consult palliative Care for hospice evaluation  · Continue fentanyl p r n   Pending further input from palliative Care for pain management

## 2019-09-13 NOTE — CONSULTS
Consultation - Palliative and Supportive Care   Ilan Perry 78 y o  female [id number]    Assessment:     Patient Active Problem List   Diagnosis    Abdominal pain    Abnormal involuntary movements    Cataract, bilateral    Chronic pain syndrome    Closed wedge fracture of thoracic vertebra with delayed healing    COPD, mild (HCC)    Generalized weakness    HTN (hypertension)    Hyponatremia    Irritable bowel syndrome without diarrhea    Osteoporosis    Thoracic radiculopathy    Closed fracture of third lumbar vertebra with routine healing    Long-term current use of opiate analgesic    Uncomplicated opioid dependence ([facility] [address] )    Movement disorder    Abdominal aortic aneurysm (AAA) without rupture (HCC)    Anxiety    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HCC)    Preoperative clearance    Pneumomediastinum ([facility] [address] )         Plan:  1  Symptom management -    - Continue home fentanyl patch 25mcg   - Percocet  q4h PRN for moderate pain   - morphine 4mg IV q1h PRN for severe/breakthrough pain  - Clonazepam 0 5mg for anxiety  - Ativan 0 5mg IV for anxiety in case unable/ineffective PO    I am very concerned given that her imaging shows perforation, that she will not be able to achieve adequate relief PO if she develops worsening symptoms     Nevertheless I will honor patient's plan to attempt a return home  2  Goals - Level 4 comfort care  Hospice evaluation pending  Code Status: level 4   Decisional apparatus:  Patient is competent on my exam today  If competence is lost, patient's substitute decision maker would default to spouse by [place]  We appreciate the invitation to be involved in this patient's care  We will continue to follow  Please do not hesitate to reach our on call provider through our clinic answering service at  should you have acute symptom control concerns        IDENTIFICATION:  Inpatient consult to Palliative Care  Consult performed by: Hiram Fuller MD  Consult ordered by: Phil Gandara DO        Physician Requesting Consult: Ghada Mcgarry MD  Reason for Consult / Principal Problem: goals of care  Hx and PE limited by: n/a    HISTORY OF PRESENT ILLNESS:       Sudha Abrams is a 78 y o  female with history of chronic pain and AAA who presents with hematochezia for several days  Unfortunately imaging revealed "Diffuse pneumomediastinum and pneumoperitoneum likely due to ruptured bowel among other etiologies "  Overnight a discussion was held with patient and family and patient elected to not undergo surgery and focus on comfort  This morning, I held an initial goals of care conversation with eloy and tracy  Confirmed desire to pursue comfort care and worked to arrange hospice cares  Returned twice throughout the day to speak with patient, and later patient and family to confirm goals of care and assess for symptoms  Patient amazingly with very few symptoms  Good appetite  Mild abdominal pain         Review of Systems   Unable to perform ROS: acuity of condition (Conversaiton focused on goals in critically ill patient)       Past Medical History:   Diagnosis Date    Abdominal aortic aneurysm (AAA) 3 0 cm to 5 0 cm in diameter in female ([facility] [address] ) 06/08/2018    4 9 cm     Abdominal pain 4/28/2016    Anxiety     Chronic obstructive lung disease (HCC)     Compression fracture of thoracic vertebra ([facility] [address] )     last assessed 2/20/17    Depression     Dysthymia     Failure to thrive in adult     Fracture of lumbar vertebra ([facility] [address] )     Hypertension     benign essential    Hyponatremia     Pressure injury of skin      Past Surgical History:   Procedure Laterality Date    APPENDECTOMY      HYSTERECTOMY      IR VISCERAL ANGIOGRAPHY / INTERVENTION  5/16/2019    VT VASCULAR SURGERY PROCEDURE UNLIST N/A 5/16/2019    Procedure: ARTERIOGRAM - mesenteric angiogram with insertion of mesenteric artery stent and Aortogram;  Surgeon: Geraldine Ureña MD;  Location: [facility];  Service: Vascular     Social History     Socioeconomic History    Marital status: /Civil Union     Spouse name: Not on file    Number of children: Not on file    Years of education: Not on file    Highest education level: Not on file   Occupational History    Not on file   Social Needs    Financial resource strain: Not on file    Food insecurity:     Worry: Not on file     Inability: Not on file    Transportation needs:     Medical: Not on file     Non-medical: Not on file   Tobacco Use    Smoking status: Former Smoker     Last attempt to quit: 1/15/2019     Years since quittin 6    Smokeless tobacco: Never Used   Substance and Sexual Activity    Alcohol use: Not Currently     Frequency: Never     Binge frequency: Never     Comment: conflicting both occasionally an no use per [employer]    Drug use: No    Sexual activity: Not on file   Lifestyle    Physical activity:     Days per week: Not on file     Minutes per session: Not on file    Stress: Not on file   Relationships    Social connections:     Talks on phone: Not on file     Gets together: Not on file     Attends Zoroastrian service: Not on file     Active member of club or organization: Not on file     Attends meetings of clubs or organizations: Not on file     Relationship status: Not on file    Intimate partner violence:     Fear of current or ex partner: Not on file     Emotionally abused: Not on file     Physically abused: Not on file     Forced sexual activity: Not on file   Other Topics Concern    Not on file   Social History Narrative    Always uses seatbelt     Family History   Problem Relation Age of Onset    Cirrhosis Mother         hepatic    Bone cancer Father        MEDICATIONS / ALLERGIES:    all current active meds have been reviewed and current meds:   Current Facility-Administered Medications   Medication Dose Route Frequency    acetaminophen (TYLENOL) tablet 650 mg  650 mg Oral Q6H PRN    albuterol (PROVENTIL HFA,VENTOLIN HFA) inhaler 2 puff  2 puff Inhalation Q4H PRN    clonazePAM (KlonoPIN) tablet 0 5 mg  0 5 mg Oral TID PRN    fentaNYL (DURAGESIC) 25 mcg/hr TD 72 hr patch 25 mcg  25 mcg Transdermal Q72H    gabapentin (NEURONTIN) capsule 300 mg  300 mg Oral TID    LORazepam (ATIVAN) 2 mg/mL injection 0 5 mg  0 5 mg Intravenous Q2H PRN    morphine (PF) 4 mg/mL injection 4 mg  4 mg Intravenous Q1H PRN    oxyCODONE-acetaminophen (PERCOCET) 5-325 mg per tablet 2 tablet  2 tablet Oral Q4H PRN    traZODone (DESYREL) tablet 50 mg  50 mg Oral HS       No Known Allergies    OBJECTIVE:    Physical Exam  Physical Exam   Constitutional: No distress  Elderly and generally frail   HENT:   Head: Atraumatic  Right Ear: External ear normal    Left Ear: External ear normal    Eyes: Right eye exhibits no discharge  Left eye exhibits no discharge  Pulmonary/Chest: No respiratory distress  Abdominal: Soft  Musculoskeletal: She exhibits no edema  Neurological: She is alert  Skin: Skin is warm and dry  She is not diaphoretic  There is pallor  Psychiatric: She has a normal mood and affect  Her behavior is normal  Thought content normal    Nursing note and vitals reviewed  Lab Results:   I have personally reviewed pertinent labs  , CBC:   Lab Results   Component Value Date    WBC 20 97 (H) 09/12/2019    HGB 8 4 (L) 09/12/2019    HCT 26 2 (L) 09/12/2019    MCV 94 09/12/2019     09/12/2019    MCH 30 2 09/12/2019    MCHC 32 1 09/12/2019    RDW 13 1 09/12/2019    MPV 9 4 09/12/2019    NRBC 0 09/12/2019   , CMP:   Lab Results   Component Value Date    SODIUM 127 (L) 09/12/2019    K 4 0 09/12/2019    CL 90 (L) 09/12/2019    CO2 29 09/12/2019    BUN 19 09/12/2019    CREATININE 1 32 (H) 09/12/2019    CALCIUM 9 3 09/12/2019    AST 20 09/12/2019    ALT 9 (L) 09/12/2019    ALKPHOS 75 09/12/2019    EGFR 38 09/12/2019     Imaging Studies: I personally reviewed relevant reports and personally reviewed CT scan  EKG, Pathology, and Other Studies: I personally reviewed relevant reports  Counseling / Coordination of Care  Total floor / unit time spent today 85+ minutes  Greater than 50% of total time was spent with the patient and / or family counseling and / or coordination of care(08:45-09:20) (12:35-12:55)  A description of the counseling / coordination of care:  Goals of care, change in code status, initiation of hospice services, multiple family meetings as above

## 2019-09-13 NOTE — HOSPICE NOTE
Hospice liaison met with patient,  Spouse, and daughter Jen Raymundo at bedside  Hospice philosophy reviewed with patient and family and hospice benefit explained to family in detail  Patient states her goal is to go home and be kept comfortable  She is tired at time of visit and requested that spouse step out of the room to review and sign hospice consents  Consents explained to  and signed  [facility] DNR order signed by Dr Olivia Benton  Patient declines need for additional DME in the home  She owns wheelchair, walker and shower chair  Family prepared to transport patient home  Scripts for [employer], ativan intensol and morphine intensol given to daughter Jen Raymundo for local [facility]  Hospice agency main  contact number reviewed with  and daughter  Hospice home nurse to make initial visit on Saturday  Patient and family aware of this      Preeti CALHOUN BSN  [phone]

## 2019-09-13 NOTE — ASSESSMENT & PLAN NOTE
· Will hold off on additional blood work for now as patient is not demonstrating neuro symptoms and is pursuing comfort based approach

## 2019-09-13 NOTE — PLAN OF CARE
Problem: GASTROINTESTINAL - ADULT  Goal: Minimal or absence of nausea and/or vomiting  Description  INTERVENTIONS:  - Administer IV fluids if ordered to ensure adequate hydration  - Maintain NPO status until nausea and vomiting are resolved  - Nasogastric tube if ordered  - Administer ordered antiemetic medications as needed  - Provide nonpharmacologic comfort measures as appropriate  - Advance diet as tolerated, if ordered  - Consider nutrition services referral to assist patient with adequate nutrition and appropriate food choices  Outcome: Progressing

## 2019-09-13 NOTE — NURSING NOTE
Received report from night shift UNIQUE Gonzáles  Patient is comfort care, full assessment deferred due to comfort measures  Patient is resting comfortably in bed, alert and oriented x4, no signs of respiratory distress, ambulates to the bathroom accompanied by family whom she lives with  Family educated at bedside about comfort care and what resources are available to them  Comfort cart delivered by dietary department  RN educated patient and family about hourly rounding and callbell utilization  Fall precautions in place  Will continue to monitor

## 2019-09-13 NOTE — ED NOTES
CC- rectal bleeding, pneumomediastinum  Admission related to injury?- no  Orientation status- A&O x4  Abnormal labs/abnormal focused assessment/vitals- sodium 127, Cl 90, creat 1 32, glucose 146, WBC 20 9, RBC 2 78, lactic 2 1, hb 8 4, hct 26 2, BP 95/53   Medication/drips- zosyn,   Last time narcotics given- fentanyl 0218  IV lines/drains/etc- 20 R AC, 20 L AC  Isolation status- n/a  Skin-  N/a   Ambulation- assist x2  Other- Patient DNR/comfort care?    ED nurse's name and phone numberLucoral Henson [address] SAM Romano RN  09/13/19 3790

## 2019-09-13 NOTE — H&P
H&P- Ancelmo Lanier 1940, 78 y o  female MRN: [id number]    Unit/Bed#: -01 Encounter: [id number]    Primary Care Provider: ELMA Higginbotham   Date and time admitted to hospital: 9/12/2019 11:03 PM        * Pneumomediastinum ([facility] [address] )  Assessment & Plan  After discussion with family and surgery, patient and family have elected to undergo comfort based approach  · Will continue antibiotics temporarily per family request  · Continue comfort care  · Will consult palliative Care for hospice evaluation  · Continue fentanyl p r n  Pending further input from palliative Care for pain management    Anxiety  Assessment & Plan  · Continue trazodone, Klonopin    Abdominal aortic aneurysm (AAA) without rupture (HCC)  Assessment & Plan  · Will hold medications at this time except for antibiotics  · See plan under pneumomediastinum  · Palliative care consult    Hyponatremia  Assessment & Plan  · Will hold off on additional blood work for now as patient is not demonstrating neuro symptoms and is pursuing comfort based approach    COPD, mild ([address] )  Assessment & Plan  · Will continue p r n  Albuterol in the meantime pending consult by palliative care        VTE Prophylaxis: None  / reason for no mechanical VTE prophylaxis pt comfort care   Code Status: Level 4 - Comfort Care   POLST: There is no POLST form on file for this patient (pre-hospital)    Anticipated Length of Stay:  Patient will be admitted on an Observation basis with an anticipated length of stay of < 2 midnights  Justification for Hospital Stay: Please see detailed plans noted above  Chief Complaint:     Abdominal pain    History of Present Illness:  Ancelmo Lanier is a 78 y o  female who has known history of infrarenal aortic aneurysm and presented to the ED with complaint of abdominal pain along with rectal bleeding and diarrhea    On evaluation in the ED, patient was found to have free air in the abdomen on imaging which was concerning for pneumomediastinum  She also had marked leukocytosis and anemia, along with hyponatremia  Patient had discussion with surgery along with family present and it was agreed upon that patient would not tolerate surgery, and she did not desire surgical approach at this time  This, decision was made to pursue a more palliative based approach with tentative plan to continue antibiotics in the short term until patient could be evaluated by palliative care  Currently, patient is somnolent, but easily arousable  She reports some continued abdominal pain, but is in no acute distress  She expresses desire to not receive IV fluid if at all possible  Review of Systems   Constitutional: Negative for activity change, chills and fever  HENT: Negative  Negative for hearing loss, sore throat and trouble swallowing  Eyes: Negative for visual disturbance  Respiratory: Negative for cough, shortness of breath and wheezing  Cardiovascular: Negative for chest pain, palpitations and leg swelling  Gastrointestinal: Positive for abdominal pain, blood in stool and diarrhea  Negative for abdominal distention, constipation, nausea and vomiting  Endocrine: Negative  Genitourinary: Negative for dysuria, frequency and hematuria  Musculoskeletal: Negative for arthralgias, joint swelling and myalgias  Skin: Negative  Allergic/Immunologic: Negative for immunocompromised state  Neurological: Negative for dizziness, facial asymmetry, speech difficulty, weakness, numbness and headaches  Hematological: Negative  Psychiatric/Behavioral: Negative for behavioral problems, confusion, dysphoric mood and self-injury           Past Medical and Surgical History:   Past Medical History:   Diagnosis Date    Abdominal aortic aneurysm (AAA) 3 0 cm to 5 0 cm in diameter [facility] ) 06/08/2018    4 9 cm     Abdominal pain 4/28/2016    Anxiety     Chronic obstructive lung disease (HCC)     Compression fracture of thoracic vertebra (HCC)     last assessed 17    Depression     Dysthymia     Failure to thrive in adult     Fracture of lumbar vertebra (HCC)     Hypertension     benign essential    Hyponatremia     Pressure injury of skin      Past Surgical History:   Procedure Laterality Date    APPENDECTOMY      HYSTERECTOMY      IR VISCERAL ANGIOGRAPHY / INTERVENTION  2019    MA VASCULAR SURGERY PROCEDURE UNLIST N/A 2019    Procedure: ARTERIOGRAM - mesenteric angiogram with insertion of mesenteric artery stent and Aortogram;  Surgeon: Lawrence Mccoy MD;  [facility];  Service: Vascular       Meds/Allergies:  Medications Prior to Admission   Medication    Calcium Carb-Cholecalciferol (CALCIUM 600 + D) 600-200 MG-UNIT TABS    clonazePAM (KlonoPIN) 0 5 mg tablet    [START ON 10/25/2019] fentaNYL (DURAGESIC) 25 mcg/hr    gabapentin (NEURONTIN) 300 mg capsule    lactulose 10 g/15 mL    metaxalone (SKELAXIN) 800 mg tablet    Multiple Vitamins-Minerals (MULTIVITAMIN ADULT PO)    Naloxone HCl 4 MG/0 1ML LIQD    ondansetron (ZOFRAN-ODT) 4 mg disintegrating tablet    [START ON 10/25/2019] oxyCODONE-acetaminophen (PERCOCET)  mg per tablet    Probiotic Product (ALIGN) 4 MG CAPS    Psyllium (METAMUCIL) 28 3 % POWD    traZODone (DESYREL) 50 mg tablet    clopidogrel (PLAVIX) 75 mg tablet    Naloxegol Oxalate (MOVANTIK) 12 5 MG TABS       Allergies: No Known Allergies  History:  Marital Status: /Civil Union     Substance Use History:   Social History     Substance and Sexual Activity   Alcohol Use Not Currently    Frequency: Never    Binge frequency: Never    Comment: conflicting both occasionally an no use per [employer]     Social History     Tobacco Use   Smoking Status Former Smoker    Last attempt to quit: 1/15/2019    Years since quittin 6   Smokeless Tobacco Never Used     Social History     Substance and Sexual Activity   Drug Use No       Family History:  Family History   Problem Relation Age of Onset    Cirrhosis Mother         hepatic    Bone cancer Father        Physical Exam:     Vitals:   Blood Pressure: 120/73 (09/13/19 0438)  Pulse: 85 (09/13/19 0438)  Temperature: 98 1 °F (36 7 °C) (09/13/19 0438)  Temp Source: Oral (09/12/19 2310)  Respirations: 16 (09/13/19 0438)  Height: 5' (152 4 cm) (09/12/19 2306)  Weight - Scale: 38 3 kg (84 lb 7 oz) (09/12/19 2306)  SpO2: 92 % (09/13/19 0438)    Constitutional:  Well developed  Appears cachectic  Eyes:  PERRL, conjunctiva normal   HENT:  Atraumatic, external ears normal, nose normal, oropharynx moist, no pharyngeal exudates  Neck - normal range of motion, no tenderness, supple   Respiratory:  No respiratory distress  Normal breath sounds  No rales, no wheezing   Cardiovascular:  Normal rate, normal rhythm, no murmurs, no gallops, no rubs   GI:  Soft, nondistended, normal bowel sounds  Left-sided tenderness  :  No costovertebral angle tenderness  Musculoskeletal:  No edema, no tenderness, no deformities  Back - no tenderness  Integument:  Well hydrated, no rash  Pallor noted  Lymphatic:  No lymphadenopathy noted   Neurologic:  Alert & awake, communicative, CN 2-12 normal, normal motor function, normal sensory function, no focal deficits noted   Psychiatric:  Speech and behavior appropriate       Lab Results: I have personally reviewed pertinent reports  Results from last 7 days   Lab Units 09/12/19  2347   WBC Thousand/uL 20 97*   HEMOGLOBIN g/dL 8 4*   HEMATOCRIT % 26 2*   PLATELETS Thousands/uL 225   LYMPHO PCT % 4*   MONO PCT % 6   EOS PCT % 0     Results from last 7 days   Lab Units 09/12/19  2347   POTASSIUM mmol/L 4 0   CHLORIDE mmol/L 90*   CO2 mmol/L 29   BUN mg/dL 19   CREATININE mg/dL 1 32*   CALCIUM mg/dL 9 3   ALK PHOS U/L 75   ALT U/L 9*   AST U/L 20     Results from last 7 days   Lab Units 09/12/19  2347   INR  1 32*       EKG: not obtained    Imaging: I have personally reviewed pertinent reports  Ct Recon Only Lumbar Spine    Result Date: 9/13/2019  Narrative: CT LUMBAR SPINE INDICATION:   known compression fractures, worsening pain  COMPARISON:  February 1, 2019 TECHNIQUE: Axial CT examination of the lumbar spine was obtained utilizing reconstructed images from CT of the chest, abdomen and pelvis performed the same day  Images were reformatted in the sagittal and coronal planes  This examination, like all CT scans performed in the [facility], was performed utilizing techniques to minimize radiation dose exposure, including the use of iterative reconstruction and automated exposure control  FINDINGS: ALIGNMENT: Normal alignment of lumbar vertebral bodies  No subluxation  VERTEBRAL BODIES: Compression deformities from T11 to L4 are again visualized  DEGENERATIVE CHANGES: Moderate multilevel degenerative disc disease  PREVERTEBRAL AND PARASPINAL SOFT TISSUES: Normal   No hematoma  Impression: Old compression deformities of the T11-L4 vertebral bodies  Workstation performed: [id number]     Vas Celiac And/or Mesenteric Duplex; Complete Study    Result Date: 8/23/2019  Narrative:  [facility] REPORT CLINICAL: Indications:  Routine surveillance  Patient is s/p SMA stent  Patient reports ongoing constipation and abdominal pain that does not change after eating  Reports that she has not gained or lost weight s/p procedure  Known AAA Risk Factors The patient has history of HTN  The patient current BMI is 15 62, Weight is 80 lb and height is 60 in  FINDINGS:  Unilateral              PSV  EDV  AP Diam  Sup-Catalina Ao                            2 3  Splenic                  93   26           SMA Ostial               71   14           Prox  SMA               108   25           Px Inf-Thiago Ao                         3 6  Mid   SMA                122   26           Celiac                   98   34           Dist  SMA               106   13           Ds Inf-Thiago Ao            42   16      2 5 Celiac Artery Standing   85   31            Segment     Rig           Left                    PSV  AP Diam  PSV  EDV  Prox Renal                 78   28  Prox LOIS     78      1 3               CONCLUSION:  Impression The aorta is patent and aneurysmal measuring 3 6 cm at the greatest diameter  The celiac, splenic and hepatic arteries are patent  Widely patent superior mesenteric artery  Difficulty visualizing the inferior mesenteric artery  The proximal left renal artery is patent  Tech note: Study technically challenging secondary to bowel gas and patient's inability to remain still  SIGNATURE: Electronically Signed by: Bria Cuenca on 2019-08-23 05:42:55 PM    Ct Abdomen Pelvis With Contrast    Result Date: 9/13/2019  Narrative: CT ABDOMEN AND PELVIS WITH IV CONTRAST INDICATION:   known infrarenal aneurysm, abdominal pain and rectal bleeding  COMPARISON:  February 1, 2019 TECHNIQUE:  CT examination of the abdomen and pelvis was performed  Axial, sagittal, and coronal 2D reformatted images were created from the source data and submitted for interpretation  Radiation dose length product (DLP) for this visit:  394 7 mGy-cm   This examination, like all CT scans performed in the [facility], was performed utilizing techniques to minimize radiation dose exposure, including the use of iterative reconstruction and automated exposure control  IV Contrast:  100 mL of iohexol (OMNIPAQUE) Enteric Contrast:  Enteric contrast was not administered  FINDINGS: ABDOMEN LOWER CHEST:  Atelectasis seen within the right lower lobe  Pneumomediastinum is visualized  LIVER/BILIARY TREE:  Unremarkable  GALLBLADDER:  No calcified gallstones  No pericholecystic inflammatory change  SPLEEN:  Unremarkable  PANCREAS:  Unremarkable  ADRENAL GLANDS:  Unremarkable  KIDNEYS/URETERS:  One or more simple renal cyst(s) is noted  Otherwise unremarkable kidneys  No hydronephrosis   STOMACH AND BOWEL:  Colonic diverticulosis without evidence of acute diverticulitis  APPENDIX:  No findings to suggest appendicitis  ABDOMINOPELVIC CAVITY:  Large amount of pneumoperitoneum is visualized  VESSELS:  Abdominal aortic aneurysm measuring up to 5 1 cm with partially thrombosed left portion is noted  PELVIS REPRODUCTIVE ORGANS:  Patient is status post hysterectomy  URINARY BLADDER:  Unremarkable  ABDOMINAL WALL/INGUINAL REGIONS:  Unremarkable  OSSEOUS STRUCTURES:  Compression fractures from T11 to L4 are visualized  Impression: Diffuse pneumomediastinum and pneumoperitoneum likely due to ruptured bowel among other etiologies  Clinical correlation is recommended  I personally discussed this study with Kortney Mayen on 9/13/2019 at 12:49 AM  Workstation performed: [id number]         Portions of the record may have been created with voice recognition software  Occasional wrong word or "sound a like" substitutions may have occurred due to the inherent limitations of voice recognition software  Read the chart carefully and recognize, using context, where substitutions have occurred

## 2019-09-13 NOTE — PHYSICIAN ADVISOR
Current patient class: Inpatient  The patient is currently on Hospital Day: 2 at [address]      The patient was admitted to the hospital  on 9/13/19 at [phone] for the following diagnosis:  Pneumomediastinum ([address] ) [J98 2]  Rectal bleeding [K62 5]  Abdominal pain [R10 9]     After review of the relevant documentation, labs, vital signs and test results, the patient  is most appropriate for OBSERVATION STATUS  In this particular case the patient was admitted to the hospital as an inpatient  The patient however fails to satisfy the 2 midnight benchmark and closer scrutiny of the case is warranted  After review of the patient presentation and relevant labs the patient was most appropriate for observation status on admission  Rationale is as follows: The patient is a 78 yrs   Female who presented to the ED at 9/12/2019 11:03 PM with a chief complaint of Rectal Bleeding (As per EMS, patient reports diarrhea with bright red blood in stool for the past 4 days, patient takes baby aspirin, has hx of abdominal aneurysm )  As per documentation on admission the patient was noted to have abdominal pain and did have pneumomediastinum  Patient also wanted to pursue hospice/palliative care and is level for comfort care as far as code status goes  I did speak to the provider who is taking care the patient today Ariane Garcia who I did get the plan of care  The the patient's plan is to go home with hospice  The patient does not want any active treatment  Given that this is a code 40 the patient is observation appropriate since no acute management was desired in the hospital   Dr Monika Anderson will change the patient to observation      The patients vitals on arrival were ED Triage Vitals   Temperature Pulse Respirations Blood Pressure SpO2   09/12/19 2310 09/12/19 2310 09/12/19 2310 09/12/19 2310 09/13/19 0030   98 °F (36 7 °C) 79 16 107/57 95 %      Temp Source Heart Rate Source Patient Position - Orthostatic VS BP Location FiO2 (%)   09/12/19 2310 09/12/19 2310 09/13/19 0300 09/12/19 2310 --   Oral Monitor Lying Left arm       Pain Score       09/13/19 0300       No Pain           Past Medical History:   Diagnosis Date    Abdominal aortic aneurysm (AAA) 3 0 cm to 5 0 cm in diameter in female ([address] ) 06/08/2018    4 9 cm     Abdominal pain 4/28/2016    Anxiety     Chronic obstructive lung disease (HCC)     Compression fracture of thoracic vertebra (HCC)     last assessed 2/20/17    Depression     Dysthymia     Failure to thrive in adult     Fracture of lumbar vertebra (HCC)     Hypertension     benign essential    Hyponatremia     Pressure injury of skin      Past Surgical History:   Procedure Laterality Date    APPENDECTOMY      HYSTERECTOMY      IR VISCERAL ANGIOGRAPHY / INTERVENTION  5/16/2019    [place] VASCULAR SURGERY PROCEDURE UNLIST N/A 5/16/2019    Procedure: ARTERIOGRAM - mesenteric angiogram with insertion of mesenteric artery stent and Aortogram;  Surgeon: Lorene Montalvo MD;  Location: [facility];  Service: Vascular           Consults have been placed to:   IP CONSULT TO PALLIATIVE CARE  IP CONSULT TO HOSPICE    Vitals:    09/13/19 0319 09/13/19 0400 09/13/19 0438 09/13/19 0733   BP:  (!) 84/52 120/73 127/77   BP Location:  Left arm     Pulse:  73 85 97   Resp:  18 16 18   Temp:   98 1 °F (36 7 °C) 99 3 °F (37 4 °C)   TempSrc:       SpO2: 96% 98% 92% 94%   Weight:       Height:           Most recent labs:    Recent Labs     09/12/19  2347   WBC 20 97*   HGB 8 4*   HCT 26 2*      K 4 0   CALCIUM 9 3   BUN 19   CREATININE 1 32*   INR 1 32*   AST 20   ALT 9*   ALKPHOS 75       Scheduled Meds:  Current Facility-Administered Medications:  acetaminophen 650 mg Oral Q6H PRN [place] Gato Grace DO   albuterol 2 puff Inhalation Q4H PRN [place] Gato Grace DO   clonazePAM 0 5 mg Oral TID PRN Sheela Arch, DO   fentaNYL 25 mcg Transdermal Q72H Michael Savage MD   gabapentin 300 mg Oral TID Rosangela Enriquez, DO   LORazepam 0 5 mg Intravenous Q2H PRN Romina Abreu MD   morphine injection 4 mg Intravenous Q1H PRN Romina Abreu MD   oxyCODONE-acetaminophen 2 tablet Oral Q4H PRN Romina Abreu MD   traZODone 50 mg Oral HS Torey Grace DO     Continuous Infusions:   PRN Meds:   acetaminophen    albuterol    clonazePAM    LORazepam    morphine injection    oxyCODONE-acetaminophen    Surgical procedures (if appropriate):

## 2019-09-13 NOTE — DISCHARGE SUMMARY
Discharge- Ancelmo Lanier 1940, 78 y o  female MRN: [id number]    Unit/Bed#: -01 Encounter: [id number]    Primary Care Provider: ELMA Higginbotham   Date and time admitted to hospital: 9/12/2019 11:03 PM        No new Assessment & Plan notes have been filed under this hospital service since the last note was generated  Service: Hospitalist        Discharging Physician / Practitioner: Jason Campos MD  PCP: Nohemi Higginbotham  Admission Date:   Admission Orders (From admission, onward)     Ordered        09/13/19 1412  Place in Observation  Once         09/13/19 1037  Inpatient Admission  Once         09/13/19 0302  Place in Observation (expected length of stay for this patient is less than two midnights)  Once                   Discharge Date: 09/13/19    Disposition:      Other: Home    For Discharges to [facility] SNF:   · Not Applicable to this Patient - Not Applicable to this Patient    Reason for Admission:  Abdominal pain and rectal bleeding    Discharge Diagnoses:     Please see assessment and plan section above for further details regarding discharge diagnoses  Resolved Problems  Date Reviewed: 9/13/2019    None          Consultations During Hospital Stay:  · Palliative care    Procedures Performed:   · None     Medication Adjustments and Discharge Medications:  · Summary of Medication Adjustments made as a result of this hospitalization:  Addition of Percocet  · Medication Dosing Tapers - Please refer to Discharge Medication List for details on any medication dosing tapers (if applicable to patient)  · Medications being temporarily held (include recommended restart time):  None  · Discharge Medication List: See after visit summary for reconciled discharge medications  Wound Care Recommendations:  When applicable, please see wound care section of After Visit Summary      Diet Recommendations at Discharge:  Diet -        Diet Orders   (From admission, onward) Start     Ordered    09/13/19 1124  Diet NPO  Diet effective now     Question Answer Comment   Diet Type NPO    RD to adjust diet per protocol? Yes        09/13/19 1123                Instructions for any Catheters / Lines Present at Discharge (including removal date, if applicable):  None    Significant Findings / Test Results:   · CT abdomen pelvis showing pneumomediastinum and pneumoperitoneum    Incidental Findings:   · None     Test Results Pending at Discharge (will require follow up): · None     Outpatient Tests Requested:  · None    Complications:  None    Hospital Course:     Stephani Carr is a 78 y o  female patient who originally presented to the hospital on 9/12/2019 due to severe abdominal pain and rectal bleeding  Patient found to have diffuse pneumomediastinum and pneumoperitoneum on CT scan abdomen and pelvis likely due to ruptured bowel  Pain control was initiated along with antibiotics  Palliative Care was consulted for further evaluation after further discussion with family desiring comfort care instead of surgery  Patient was referred to hospice at which point the family decided to take her home with hospice services this on 09/13/2019 patient was discharged home with hospice services  Condition at Discharge: stable     Discharge Day Visit / Exam:     Subjective:  Patient reports abdominal pain slightly improved with pain control  Endorsing desire to go home to be with family  Vitals: Blood Pressure: 127/77 (09/13/19 0733)  Pulse: 97 (09/13/19 0733)  Temperature: 99 3 °F (37 4 °C) (09/13/19 0733)  Temp Source: Oral (09/12/19 2310)  Respirations: 18 (09/13/19 0733)  Height: 5' (152 4 cm) (09/12/19 2306)  Weight - Scale: 38 3 kg (84 lb 7 oz) (09/12/19 2306)  SpO2: 94 % (09/13/19 0733)  Exam:   Physical Exam   Constitutional: She is oriented to person, place, and time  Malnourished and cachectic   HENT:   Head: Normocephalic and atraumatic     Eyes: Pupils are equal, round, and reactive to light  EOM are normal    Neck: Normal range of motion  Cardiovascular: Normal rate and regular rhythm  Pulmonary/Chest: Effort normal    Abdominal: Soft  There is tenderness  Neurological: She is alert and oriented to person, place, and time  Skin: Skin is warm and dry  Psychiatric: She has a normal mood and affect  Her behavior is normal        Discussion with Family:  Discussed findings of CT scan abdomen and pelvis  Palliative care discussion with family  Goals of Care Discussions:  · Code Status at Discharge: Level 4 - Comfort Care  · Were there any Goals of Care Discussions during Hospitalization?: Yes  · Results of any General Goals of Care Discussions:  Patient was made comfort care   · POLST Completed: No   · If POLST Completed, Summary of POLST Agreement Provided Here:  Not applicable   · OK to Rehospitalize if Needed? No    Discharge instructions/Information to patient and family:   See after visit summary section titled Discharge Instructions for information provided to patient and family  Planned Readmission:  No      Discharge Statement:  I spent 30 minutes discharging the patient  This time was spent on the day of discharge  I had direct contact with the patient on the day of discharge  Greater than 50% of the total time was spent examining patient, answering all patient questions, arranging and discussing plan of care with patient as well as directly providing post-discharge instructions  Additional time then spent on discharge activities      ** Please Note: This note has been constructed using a voice recognition system **

## 2019-09-13 NOTE — DISCHARGE INSTRUCTIONS
Hospice Care   WHAT YOU NEED TO KNOW:   What is hospice care? Hospice care focuses on relieving your symptoms and improving the quality of the last months of your life  Hospice care will be specific to your needs and the needs of your family  Care can be provided at home or in a hospital  It can also be provided in a specialized hospice facility or long-term care facility  Who provides hospice care? Hospice care is provided by a team trained in support and education related to death and dying  This team includes doctors, nurses, and social workers  It may also include home health aides, clergy, therapists, and trained volunteers  A team member will be available any time day or night to answer questions or help with problems  The hospice doctor and your healthcare provider will work together to manage your treatment  What do hospice care services include? · Treatment management  helps ease your symptoms, such as pain  This may be done using medicines or certain therapies  Equipment, a bed, or other medical supplies may be provided to help care for you  · Emotional and psychological care  is provided to help you, your family, and those close to you cope with their feelings  Regular meetings will be held to discuss your condition, your needs, and the needs of your loved ones  You and your family may join support groups or meet others in similar situations  · Respite care  provides your family and healthcare providers up to 5 days to rest from providing care  During this time, you will be cared for in a hospice facility, hospital, or long-term care facility  · Practical support  assists you and your family with concerns such as legal issues and  arrangements  Your  can help you find services that fit your needs and your family's needs  · Spiritual and cultural support  helps you and your family evaluate Gnosticism values and cultural beliefs   Thinking about values and beliefs may make it easier to understand and accept your condition  · Loss support  is provided to the family for about 1 year after death  The hospice care team will help your family through the process of grieving  Your loved ones will receive visits and phone calls, and may be referred to support groups  CARE AGREEMENT:   You have the right to help plan your care  Learn about your health condition and how it may be treated  Discuss treatment options with your caregivers to decide what care you want to receive  You always have the right to refuse treatment  The above information is an  only  It is not intended as medical advice for individual conditions or treatments  Talk to your doctor, nurse or pharmacist before following any medical regimen to see if it is safe and effective for you  © 2017 [employer] Information is for End User's use only and may not be sold, redistributed or otherwise used for commercial purposes  All illustrations and images included in CareNotes® are the copyrighted property of [employer]  or Nicho Moreira

## 2019-09-13 NOTE — MALNUTRITION/BMI
This medical record reflects one or more clinical indicators suggestive of malnutrition     Malnutrition Findings:   Malnutrition type: Chronic illness  Degree of Malnutrition: Other severe protein calorie malnutrition  Malnutrition Characteristics: Fat loss, Muscle loss     severe malnutrition r/t prolonged inadequate intake as evidenced by BMI 16 49, severe cachexia, temporal wasting, clavicle protrusion, acromion process protrusion, orbital hollowing; to be treated per pt wishes on comfort care    BMI Findings:  BMI Classifications: Underweight < 18 5     Body mass index is 16 49 kg/m²  See Nutrition note dated 09/13/2019 for additional details  Completed nutrition assessment is viewable in the nutrition documentation

## 2019-09-13 NOTE — SOCIAL WORK
Marielos spoke with Thomas Hermosillo and was informed that the rec would be for hospital hospice, however the pt would like home hospice  The family would like  home hospice  MARIELOS referred the pt to [facility]

## 2019-09-16 LAB
ABO GROUP BLD BPU: NORMAL
BPU ID: NORMAL
CROSSMATCH: NORMAL
UNIT DISPENSE STATUS: NORMAL
UNIT PRODUCT CODE: NORMAL
UNIT RH: NORMAL

## 2019-09-18 LAB
BACTERIA BLD CULT: NORMAL
BACTERIA BLD CULT: NORMAL

## 2024-04-05 NOTE — TELEPHONE ENCOUNTER
There number is [phone] you can request to speak to a manager or supervisor ot have [address] work on it if you would like bc I have contacted them twice in regards to this patient and the nurse called me back stating that she does not qualify  at this time    They would recommend her f/u with her specialist bc she is not "severe" enough at this time    They have an office on [facility] they will not schedule her based on her not having "life limiting diseases"    Please let me know what way you would like to go 0

## 2025-02-04 NOTE — TELEPHONE ENCOUNTER
Not sure who increased this med, but the note in March states 1/2 to 1 bid prn for #60  If Dr Bren Moyer increased this he needs to reorder it please  If not I will  Also, need to check the [place] website  Yes

## (undated) DEVICE — KNEE IMMOBILIZER: Brand: DEROYAL

## (undated) DEVICE — TAD TAPERED GUIDE WIRE SYSTEM WITH MICROGLIDE COATING .035 145 CM: Brand: TAD

## (undated) DEVICE — PINNACLE R/O II INTRODUCER SHEATH WITH RADIOPAQUE MARKER: Brand: PINNACLE

## (undated) DEVICE — INFLATION DEVICE BASIX 30ATM

## (undated) DEVICE — RADIFOCUS GLIDEWIRE: Brand: GLIDEWIRE

## (undated) DEVICE — SYRINGE KIT,PACKAGED,,150FT,MK 7(ANGIO-ARTERION, 150ML SYR KIT W/QFT,MC)(60729385): Brand: MEDRAD® MARK 7 ARTERION DISPOSABLE SYRINGE 150 ML WITH QUICK FILL TUBE

## (undated) DEVICE — COVER PROBE INTRAOPERATIVE 6 X 96 IN

## (undated) DEVICE — DEVICE CLOSURE MYNX CONTROL 6F/7FR
Type: IMPLANTABLE DEVICE | Site: ARTERIAL | Status: NON-FUNCTIONAL
Removed: 2019-05-16

## (undated) DEVICE — INFUSER BAG 500ML

## (undated) DEVICE — 3M™ TEGADERM™ TRANSPARENT FILM DRESSING FRAME STYLE, 1624W, 2-3/8 IN X 2-3/4 IN (6 CM X 7 CM), 100/CT 4CT/CASE: Brand: 3M™ TEGADERM™

## (undated) DEVICE — MICROPUNCTURE 501

## (undated) DEVICE — GAUZE SPONGES,8 PLY: Brand: CURITY

## (undated) DEVICE — IV SET 15 DROP STERILE 0/Y GRAVITY

## (undated) DEVICE — FLEXCIL HIGH PRESSURE CONTRAST INJECTION LINE: Brand: NAMIC

## (undated) DEVICE — FLUID MANAGEMENT KIT - IR

## (undated) DEVICE — STERILE ICS CARDIOVASCULAR PK: Brand: CARDINAL HEALTH

## (undated) DEVICE — GUIDEWIRE LOC EXTENSION .035 155CM

## (undated) DEVICE — NON-DEHP HIGH FLOW RATE EXTENSION SET, MALE LUER LOCK ADAPTER

## (undated) DEVICE — CATH ANGIO 4FR 70CM  2CM SEGMENT ACCU-VU

## (undated) DEVICE — RADIFOCUS GLIDECATH: Brand: GLIDECATH